# Patient Record
Sex: FEMALE | Race: WHITE | Employment: OTHER | URBAN - METROPOLITAN AREA
[De-identification: names, ages, dates, MRNs, and addresses within clinical notes are randomized per-mention and may not be internally consistent; named-entity substitution may affect disease eponyms.]

---

## 2017-01-16 ENCOUNTER — APPOINTMENT (OUTPATIENT)
Dept: GENERAL RADIOLOGY | Facility: HOSPITAL | Age: 77
End: 2017-01-16
Attending: EMERGENCY MEDICINE
Payer: MEDICARE

## 2017-01-16 ENCOUNTER — HOSPITAL ENCOUNTER (OUTPATIENT)
Facility: HOSPITAL | Age: 77
Setting detail: OBSERVATION
Discharge: HOME | End: 2017-01-18
Attending: EMERGENCY MEDICINE | Admitting: FAMILY MEDICINE
Payer: MEDICARE

## 2017-01-16 DIAGNOSIS — R07.9 ACUTE CHEST PAIN: Primary | ICD-10-CM

## 2017-01-16 LAB
ANION GAP SERPL CALC-SCNC: 12 MMOL/L (ref 0–18)
APTT PPP: 36.4 SECONDS (ref 23.2–35.3)
BASOPHILS # BLD: 0.1 K/UL (ref 0–0.2)
BASOPHILS NFR BLD: 1 %
BILIRUB UR QL: NEGATIVE
BUN SERPL-MCNC: 12 MG/DL (ref 8–20)
BUN/CREAT SERPL: 11.1 (ref 10–20)
CALCIUM SERPL-MCNC: 9.1 MG/DL (ref 8.5–10.5)
CHLORIDE SERPL-SCNC: 102 MMOL/L (ref 95–110)
CO2 SERPL-SCNC: 24 MMOL/L (ref 22–32)
COLOR UR: YELLOW
CREAT SERPL-MCNC: 1.08 MG/DL (ref 0.5–1.5)
EOSINOPHIL # BLD: 0.2 K/UL (ref 0–0.7)
EOSINOPHIL NFR BLD: 2 %
ERYTHROCYTE [DISTWIDTH] IN BLOOD BY AUTOMATED COUNT: 16 % (ref 11–15)
GLUCOSE SERPL-MCNC: 147 MG/DL (ref 70–99)
GLUCOSE UR-MCNC: NEGATIVE MG/DL
HCT VFR BLD AUTO: 40.7 % (ref 35–48)
HGB BLD-MCNC: 13.2 G/DL (ref 12–16)
HGB UR QL STRIP.AUTO: NEGATIVE
INR BLD: 1 (ref 0.9–1.2)
KETONES UR-MCNC: NEGATIVE MG/DL
LYMPHOCYTES # BLD: 1.8 K/UL (ref 1–4)
LYMPHOCYTES NFR BLD: 18 %
MCH RBC QN AUTO: 26.1 PG (ref 27–32)
MCHC RBC AUTO-ENTMCNC: 32.4 G/DL (ref 32–37)
MCV RBC AUTO: 80.6 FL (ref 80–100)
MONOCYTES # BLD: 0.6 K/UL (ref 0–1)
MONOCYTES NFR BLD: 6 %
NEUTROPHILS # BLD AUTO: 7.1 K/UL (ref 1.8–7.7)
NEUTROPHILS NFR BLD: 73 %
NITRITE UR QL STRIP.AUTO: NEGATIVE
OSMOLALITY UR CALC.SUM OF ELEC: 288 MOSM/KG (ref 275–295)
PH UR: 5 [PH] (ref 5–8)
PLATELET # BLD AUTO: 451 K/UL (ref 140–400)
PMV BLD AUTO: 7.1 FL (ref 7.4–10.3)
POTASSIUM SERPL-SCNC: 4.5 MMOL/L (ref 3.3–5.1)
PROT UR-MCNC: NEGATIVE MG/DL
PROTHROMBIN TIME: 13.3 SECONDS (ref 11.8–14.5)
RBC # BLD AUTO: 5.05 M/UL (ref 3.7–5.4)
RBC #/AREA URNS AUTO: 2 /HPF
SODIUM SERPL-SCNC: 138 MMOL/L (ref 136–144)
SP GR UR STRIP: 1.02 (ref 1–1.03)
TROPONIN I SERPL-MCNC: 0 NG/ML (ref ?–0.03)
TROPONIN I SERPL-MCNC: 0 NG/ML (ref ?–0.03)
UROBILINOGEN UR STRIP-ACNC: <2
VIT C UR-MCNC: NEGATIVE MG/DL
WBC # BLD AUTO: 9.8 K/UL (ref 4–11)
WBC #/AREA URNS AUTO: 2 /HPF

## 2017-01-16 PROCEDURE — 85025 COMPLETE CBC W/AUTO DIFF WBC: CPT | Performed by: EMERGENCY MEDICINE

## 2017-01-16 PROCEDURE — 71010 XR CHEST AP PORTABLE  (CPT=71010): CPT

## 2017-01-16 PROCEDURE — 85730 THROMBOPLASTIN TIME PARTIAL: CPT | Performed by: NURSE PRACTITIONER

## 2017-01-16 PROCEDURE — 84484 ASSAY OF TROPONIN QUANT: CPT | Performed by: EMERGENCY MEDICINE

## 2017-01-16 PROCEDURE — 93005 ELECTROCARDIOGRAM TRACING: CPT

## 2017-01-16 PROCEDURE — 36415 COLL VENOUS BLD VENIPUNCTURE: CPT

## 2017-01-16 PROCEDURE — 99285 EMERGENCY DEPT VISIT HI MDM: CPT

## 2017-01-16 PROCEDURE — 93010 ELECTROCARDIOGRAM REPORT: CPT | Performed by: EMERGENCY MEDICINE

## 2017-01-16 PROCEDURE — 85610 PROTHROMBIN TIME: CPT | Performed by: NURSE PRACTITIONER

## 2017-01-16 PROCEDURE — 81001 URINALYSIS AUTO W/SCOPE: CPT | Performed by: EMERGENCY MEDICINE

## 2017-01-16 PROCEDURE — 80048 BASIC METABOLIC PNL TOTAL CA: CPT | Performed by: EMERGENCY MEDICINE

## 2017-01-16 RX ORDER — CHLORHEXIDINE GLUCONATE 4 G/100ML
30 SOLUTION TOPICAL
Status: COMPLETED | OUTPATIENT
Start: 2017-01-17 | End: 2017-01-16

## 2017-01-16 RX ORDER — AMLODIPINE BESYLATE 10 MG/1
10 TABLET ORAL DAILY
Status: DISCONTINUED | OUTPATIENT
Start: 2017-01-16 | End: 2017-01-16

## 2017-01-16 RX ORDER — NITROGLYCERIN 0.6 MG/1
0.6 TABLET SUBLINGUAL EVERY 5 MIN PRN
COMMUNITY
End: 2021-01-05

## 2017-01-16 RX ORDER — SODIUM CHLORIDE 9 MG/ML
INJECTION, SOLUTION INTRAVENOUS CONTINUOUS
Status: DISCONTINUED | OUTPATIENT
Start: 2017-01-16 | End: 2017-01-16

## 2017-01-16 RX ORDER — NITROGLYCERIN 0.4 MG/1
0.4 TABLET SUBLINGUAL EVERY 5 MIN PRN
Status: DISCONTINUED | OUTPATIENT
Start: 2017-01-16 | End: 2017-01-18

## 2017-01-16 RX ORDER — ASPIRIN 81 MG/1
324 TABLET, CHEWABLE ORAL ONCE
Status: COMPLETED | OUTPATIENT
Start: 2017-01-17 | End: 2017-01-17

## 2017-01-16 RX ORDER — SODIUM CHLORIDE 9 MG/ML
INJECTION, SOLUTION INTRAVENOUS CONTINUOUS
Status: CANCELLED | OUTPATIENT
Start: 2017-01-16

## 2017-01-16 RX ORDER — ASPIRIN 81 MG/1
81 TABLET ORAL DAILY
Status: DISCONTINUED | OUTPATIENT
Start: 2017-01-16 | End: 2017-01-16

## 2017-01-16 RX ORDER — ASPIRIN 81 MG/1
324 TABLET, CHEWABLE ORAL ONCE
Status: DISCONTINUED | OUTPATIENT
Start: 2017-01-16 | End: 2017-01-16

## 2017-01-16 RX ORDER — ATORVASTATIN CALCIUM 40 MG/1
40 TABLET, FILM COATED ORAL DAILY
Status: DISCONTINUED | OUTPATIENT
Start: 2017-01-16 | End: 2017-01-18

## 2017-01-16 RX ORDER — ASPIRIN 81 MG/1
324 TABLET, CHEWABLE ORAL ONCE
Status: CANCELLED | OUTPATIENT
Start: 2017-01-17

## 2017-01-16 RX ORDER — METOPROLOL SUCCINATE 100 MG/1
100 TABLET, EXTENDED RELEASE ORAL DAILY
Status: DISCONTINUED | OUTPATIENT
Start: 2017-01-17 | End: 2017-01-18

## 2017-01-16 RX ORDER — SODIUM CHLORIDE 9 MG/ML
INJECTION, SOLUTION INTRAVENOUS CONTINUOUS
Status: DISCONTINUED | OUTPATIENT
Start: 2017-01-17 | End: 2017-01-18

## 2017-01-16 RX ORDER — ASPIRIN 81 MG/1
81 TABLET ORAL DAILY
Status: DISCONTINUED | OUTPATIENT
Start: 2017-01-17 | End: 2017-01-18

## 2017-01-16 RX ORDER — CHLORHEXIDINE GLUCONATE 4 G/100ML
30 SOLUTION TOPICAL
Status: CANCELLED | OUTPATIENT
Start: 2017-01-17 | End: 2017-01-17

## 2017-01-16 RX ORDER — AMLODIPINE BESYLATE 10 MG/1
10 TABLET ORAL DAILY
Status: DISCONTINUED | OUTPATIENT
Start: 2017-01-17 | End: 2017-01-18

## 2017-01-16 RX ORDER — CLOPIDOGREL BISULFATE 75 MG/1
75 TABLET ORAL DAILY
Status: DISCONTINUED | OUTPATIENT
Start: 2017-01-16 | End: 2017-01-16

## 2017-01-16 RX ORDER — CLOPIDOGREL BISULFATE 75 MG/1
75 TABLET ORAL DAILY
Status: DISCONTINUED | OUTPATIENT
Start: 2017-01-17 | End: 2017-01-18

## 2017-01-16 RX ORDER — METOPROLOL SUCCINATE 100 MG/1
100 TABLET, EXTENDED RELEASE ORAL DAILY
Status: DISCONTINUED | OUTPATIENT
Start: 2017-01-16 | End: 2017-01-16

## 2017-01-16 NOTE — HISTORICAL OFFICE NOTE
Heidy Abimael  : 1940  ACCOUNT:  [id number]  [phone]  PCP: Dr. Danielito Santana     TODAY'S DATE: 11/15/2016  DICTATED BY:  Wolfgang Meek M.D.]    CHIEF COMPLAINT: [Followup of .  CAD - Native vessel.]    HPI: [On 11/15/2016, wilson Galvan Circ, CAD, cardiac arrest, cardiac catheterization 1999, cardiac catheterization 2000, cardiac catheterization July 2004, Circ coronary stent 1997, dyslipidemia, hypertension and RCA PTCA 1997    FAMILY HISTORY: Significant for premature CAD.  Negative for one year.]    PRESCRIPTIONS:   12/02/15 *Clopidogrel [id number]      ONE TABLET BY MOUTH EVERY DAY            11/17/15 *Metoprolol Succinate 100MG     1 TABLET DAILY. 11/25/14 *AmLODIPine Besylate  10MG      1 TABLET DAILY.

## 2017-01-16 NOTE — ED NOTES
PT safe to transfer to room per MD. Report given to Seble Fern[facility]dez [facility]. Telebox applied.

## 2017-01-16 NOTE — CONSULTS
[facility]    PATIENT'S NAME: Laura Hernandez   ATTENDING PHYSICIAN: Troy Encinas MD   CONSULTING PHYSICIAN: Mariaa Cole.  Tia Erickson MD   PATIENT ACCOUNT#:   [id number]    LOCATION:  [facility] 14 09 [facility] 1  MEDICAL RECORD #:   [id number]       DATE OF NIGHAT aneurysm. She is a nonsmoker, 2 or 3 cups of coffee a day. No alcohol. . Daughter at the bedside. She is retired. ALLERGIES:  Codeine. PHYSICAL EXAMINATION:    GENERAL:  Well-developed, well-nourished female, in no acute distress.   Alert Rosemarie Sosa MD  d: 01/16/2017 11:11:10  t: 01/16/2017 11:40:13  [facility] [id number]  [facility]/

## 2017-01-16 NOTE — ED PROVIDER NOTES
Patient Seen in: [facility]    History   Patient presents with:  Chest Pain Angina (cardiovascular)    Stated Complaint: Chest pain (angina)    HPI    64year old female with past medical history of hypertension, hypercholesterolemia, CAD with Father      CVA   • Lung Disorder Father      lung disease   • Heart Disease Father    • Breast Cancer Mother    • Hypertension Mother    • Arthritis Daughter      rheumatoid   • benign brain lesion [Other] [OTHER] Son          Smoking Status: Never Smoker She has a normal mood and affect. Her behavior is normal.   Nursing note and vitals reviewed.           ED Course     Labs Reviewed   BASIC METABOLIC PANEL (8) - Abnormal; Notable for the following:     Glucose 147 (*)     GFR, Non- 49 (*) Viewed and reviewed by myself and findings discussed with patient including need for follow up  ---------------    The patient has normal troponins, no acute EKG changes, but is high risk based on her past medical history including personal past history of

## 2017-01-16 NOTE — CONSULTS
Cardiology (Consult dictated)    Assessment:  1. Constellation of symptoms suspicious for ischemia. 2. History of CAD, PCI. Plan:  Admit for cardiac cath. Thank you.

## 2017-01-16 NOTE — HISTORICAL OFFICE NOTE
Marga Ramirez  : 1940  ACCOUNT:  [id number]  [phone]  PCP: Dr. Farzaneh Felder     TODAY'S DATE: 11/15/2016  DICTATED BY:  Sonya Stack M.D.]    CHIEF COMPLAINT: [Followup of .  CAD - Native vessel.]    HPI: [On 11/15/2016, wilson Sam Circ, CAD, cardiac arrest, cardiac catheterization 1999, cardiac catheterization 2000, cardiac catheterization July 2004, Circ coronary stent 1997, dyslipidemia, hypertension and RCA PTCA 1997    FAMILY HISTORY: Significant for premature CAD.  Negative for one year.]    PRESCRIPTIONS:   12/02/15 *Clopidogrel [id number]      ONE TABLET BY MOUTH EVERY DAY            11/17/15 *Metoprolol Succinate 100MG     1 TABLET DAILY. 11/25/14 *AmLODIPine Besylate  10MG      1 TABLET DAILY. function, with a calculated ejection fraction of 87%. PERFUSION IMAGING: At peak exercise, perfusion images were limited. There appeared to be a reduction of counts involving the distal anteroseptal region of the left ventricle.   Resting images demo

## 2017-01-17 ENCOUNTER — APPOINTMENT (OUTPATIENT)
Dept: INTERVENTIONAL RADIOLOGY/VASCULAR | Facility: HOSPITAL | Age: 77
End: 2017-01-17
Attending: INTERNAL MEDICINE
Payer: MEDICARE

## 2017-01-17 LAB
CHOLEST SERPL-MCNC: 137 MG/DL (ref 110–200)
HDLC SERPL-MCNC: 44 MG/DL
LDLC SERPL CALC-MCNC: 78 MG/DL (ref 0–99)
NONHDLC SERPL-MCNC: 93 MG/DL
TRIGL SERPL-MCNC: 74 MG/DL (ref 1–149)

## 2017-01-17 PROCEDURE — 4A033BC MEASUREMENT OF ARTERIAL PRESSURE, CORONARY, PERCUTANEOUS APPROACH: ICD-10-PCS | Performed by: INTERNAL MEDICINE

## 2017-01-17 PROCEDURE — B2111ZZ FLUOROSCOPY OF MULTIPLE CORONARY ARTERIES USING LOW OSMOLAR CONTRAST: ICD-10-PCS | Performed by: INTERNAL MEDICINE

## 2017-01-17 PROCEDURE — 80061 LIPID PANEL: CPT | Performed by: FAMILY MEDICINE

## 2017-01-17 PROCEDURE — G0463 HOSPITAL OUTPT CLINIC VISIT: HCPCS

## 2017-01-17 PROCEDURE — 4A023N7 MEASUREMENT OF CARDIAC SAMPLING AND PRESSURE, LEFT HEART, PERCUTANEOUS APPROACH: ICD-10-PCS | Performed by: INTERNAL MEDICINE

## 2017-01-17 PROCEDURE — B2151ZZ FLUOROSCOPY OF LEFT HEART USING LOW OSMOLAR CONTRAST: ICD-10-PCS | Performed by: INTERNAL MEDICINE

## 2017-01-17 PROCEDURE — 93571 IV DOP VEL&/PRESS C FLO 1ST: CPT

## 2017-01-17 PROCEDURE — 93458 L HRT ARTERY/VENTRICLE ANGIO: CPT

## 2017-01-17 PROCEDURE — 027034Z DILATION OF CORONARY ARTERY, ONE ARTERY WITH DRUG-ELUTING INTRALUMINAL DEVICE, PERCUTANEOUS APPROACH: ICD-10-PCS | Performed by: INTERNAL MEDICINE

## 2017-01-17 RX ORDER — VERAPAMIL HYDROCHLORIDE 2.5 MG/ML
INJECTION, SOLUTION INTRAVENOUS
Status: DISCONTINUED
Start: 2017-01-17 | End: 2017-01-17 | Stop reason: WASHOUT

## 2017-01-17 RX ORDER — ACETAMINOPHEN 325 MG/1
650 TABLET ORAL EVERY 4 HOURS PRN
Status: DISCONTINUED | OUTPATIENT
Start: 2017-01-17 | End: 2017-01-18

## 2017-01-17 RX ORDER — TRAMADOL HYDROCHLORIDE 50 MG/1
50 TABLET ORAL EVERY 6 HOURS PRN
Status: DISCONTINUED | OUTPATIENT
Start: 2017-01-17 | End: 2017-01-18

## 2017-01-17 RX ORDER — NITROGLYCERIN 20 MG/100ML
INJECTION INTRAVENOUS
Status: COMPLETED
Start: 2017-01-17 | End: 2017-01-17

## 2017-01-17 RX ORDER — MIDAZOLAM HYDROCHLORIDE 1 MG/ML
2 INJECTION INTRAMUSCULAR; INTRAVENOUS EVERY 5 MIN PRN
Status: DISCONTINUED | OUTPATIENT
Start: 2017-01-17 | End: 2017-01-18

## 2017-01-17 RX ORDER — HEPARIN SODIUM 1000 [USP'U]/ML
6000 INJECTION, SOLUTION INTRAVENOUS; SUBCUTANEOUS ONCE
Status: COMPLETED | OUTPATIENT
Start: 2017-01-17 | End: 2017-01-17

## 2017-01-17 RX ORDER — LIDOCAINE HYDROCHLORIDE 20 MG/ML
INJECTION, SOLUTION EPIDURAL; INFILTRATION; INTRACAUDAL; PERINEURAL
Status: COMPLETED
Start: 2017-01-17 | End: 2017-01-17

## 2017-01-17 RX ORDER — MIDAZOLAM HYDROCHLORIDE 1 MG/ML
INJECTION INTRAMUSCULAR; INTRAVENOUS
Status: COMPLETED
Start: 2017-01-17 | End: 2017-01-17

## 2017-01-17 RX ORDER — SODIUM CHLORIDE 9 MG/ML
INJECTION, SOLUTION INTRAVENOUS CONTINUOUS
Status: ACTIVE | OUTPATIENT
Start: 2017-01-17 | End: 2017-01-17

## 2017-01-17 RX ORDER — CLOPIDOGREL BISULFATE 75 MG/1
300 TABLET ORAL ONCE
Status: COMPLETED | OUTPATIENT
Start: 2017-01-17 | End: 2017-01-17

## 2017-01-17 RX ORDER — ADENOSINE 3 MG/ML
INJECTION, SOLUTION INTRAVENOUS
Status: DISCONTINUED
Start: 2017-01-17 | End: 2017-01-17 | Stop reason: WASHOUT

## 2017-01-17 RX ORDER — SODIUM CHLORIDE 9 MG/ML
INJECTION, SOLUTION INTRAVENOUS
Status: COMPLETED
Start: 2017-01-17 | End: 2017-01-17

## 2017-01-17 RX ORDER — ONDANSETRON 2 MG/ML
8 INJECTION INTRAMUSCULAR; INTRAVENOUS EVERY 8 HOURS PRN
Status: DISCONTINUED | OUTPATIENT
Start: 2017-01-17 | End: 2017-01-18

## 2017-01-17 RX ORDER — CLOPIDOGREL BISULFATE 75 MG/1
TABLET ORAL
Status: COMPLETED
Start: 2017-01-17 | End: 2017-01-17

## 2017-01-17 RX ORDER — HEPARIN SODIUM 1000 [USP'U]/ML
INJECTION, SOLUTION INTRAVENOUS; SUBCUTANEOUS
Status: COMPLETED
Start: 2017-01-17 | End: 2017-01-17

## 2017-01-17 RX ORDER — HEPARIN SODIUM 1000 [USP'U]/ML
INJECTION, SOLUTION INTRAVENOUS; SUBCUTANEOUS
Status: DISCONTINUED
Start: 2017-01-17 | End: 2017-01-17 | Stop reason: WASHOUT

## 2017-01-17 RX ORDER — TRAMADOL HYDROCHLORIDE 50 MG/1
100 TABLET ORAL EVERY 6 HOURS PRN
Status: DISCONTINUED | OUTPATIENT
Start: 2017-01-17 | End: 2017-01-18

## 2017-01-17 NOTE — PROGRESS NOTES
Report called to ZeusControls R.N..  Notified of finding excoriated groins upon arrival to lab. Post Cath and intervention report called to Jane at this time. Transferred to Cath lab holding. Right groin intact with out hematoma or active bleeding.   P

## 2017-01-17 NOTE — PLAN OF CARE
Aspirin for chest pain:  D. Patient took her usual 81 mg of aspirin in the morning with her other medications. Pt had chest pain/pressure and came to ED. No further aspirin given in ED. A.  Spoke to S APN to see if patient needed more aspirin today and r

## 2017-01-17 NOTE — H&P
[address] Patient Status:  Observation    1940 MRN [id number]   Location [facility]5W Attending Inez Voss MD   Hosp Day # 1 PCP Winsome Zamora MD, Femi Tinsley MD     Date:  2017  Date REMOVAL     REPAIR OF TM    Family History   Problem Relation Age of Onset   • Stroke Father      CVA   • Lung Disorder Father      lung disease   • Heart Disease Father    • Breast Cancer Mother    • Hypertension Mother    • Arthritis Daughter      rheuma Results  Component Value Date   WBC 9.8 01/16/2017   HGB 13.2 01/16/2017   HCT 40.7 01/16/2017   * 01/16/2017   CREATSERUM 1.08 01/16/2017   BUN 12 01/16/2017    01/16/2017   K 4.5 01/16/2017    01/16/2017   CO2 24 01/16/2017   *

## 2017-01-17 NOTE — PROGRESS NOTES
Dr. Sue Vang notified of Creatinine 1.08 GFR 49  IV fluids during cardiac cath increased to  100cc/hr

## 2017-01-17 NOTE — PROCEDURES
LM patent  LAD proximal 60-70%  Mid LAD 40%  Lcx stent Patent  OM1 and OM2 patent  RCA anterior takeoff irregularities    LVEF 55%    IFR of LAD positive 0.83  Stent PCI proximal LAD performed 3.5x15 mm MARGE excellent results 0% residual stenosis  Angioseal

## 2017-01-17 NOTE — PLAN OF CARE
CARDIOVASCULAR - ADULT    • Maintains optimal cardiac output and hemodynamic stability Progressing    • Absence of cardiac arrhythmias or at baseline Progressing        Medications reviewed before administation, safety, fall risk, and poc reviewed with pt.

## 2017-01-18 ENCOUNTER — PRIOR ORIGINAL RECORDS (OUTPATIENT)
Dept: OTHER | Age: 77
End: 2017-01-18

## 2017-01-18 VITALS
HEART RATE: 67 BPM | DIASTOLIC BLOOD PRESSURE: 55 MMHG | OXYGEN SATURATION: 96 % | HEIGHT: 65 IN | SYSTOLIC BLOOD PRESSURE: 110 MMHG | BODY MASS INDEX: 35.43 KG/M2 | TEMPERATURE: 98 F | WEIGHT: 212.69 LBS | RESPIRATION RATE: 17 BRPM

## 2017-01-18 LAB
ANION GAP SERPL CALC-SCNC: 9 MMOL/L (ref 0–18)
BUN SERPL-MCNC: 10 MG/DL (ref 8–20)
BUN/CREAT SERPL: 9.5 (ref 10–20)
CALCIUM SERPL-MCNC: 8.5 MG/DL (ref 8.5–10.5)
CHLORIDE SERPL-SCNC: 106 MMOL/L (ref 95–110)
CO2 SERPL-SCNC: 24 MMOL/L (ref 22–32)
CREAT SERPL-MCNC: 1.05 MG/DL (ref 0.5–1.5)
ERYTHROCYTE [DISTWIDTH] IN BLOOD BY AUTOMATED COUNT: 15.8 % (ref 11–15)
GLUCOSE SERPL-MCNC: 95 MG/DL (ref 70–99)
HCT VFR BLD AUTO: 37.4 % (ref 35–48)
HGB BLD-MCNC: 12.1 G/DL (ref 12–16)
MCH RBC QN AUTO: 26 PG (ref 27–32)
MCHC RBC AUTO-ENTMCNC: 32.3 G/DL (ref 32–37)
MCV RBC AUTO: 80.3 FL (ref 80–100)
OSMOLALITY UR CALC.SUM OF ELEC: 287 MOSM/KG (ref 275–295)
PLATELET # BLD AUTO: 395 K/UL (ref 140–400)
PMV BLD AUTO: 7.1 FL (ref 7.4–10.3)
POTASSIUM SERPL-SCNC: 4.2 MMOL/L (ref 3.3–5.1)
RBC # BLD AUTO: 4.65 M/UL (ref 3.7–5.4)
SODIUM SERPL-SCNC: 139 MMOL/L (ref 136–144)
WBC # BLD AUTO: 7.8 K/UL (ref 4–11)

## 2017-01-18 PROCEDURE — 80048 BASIC METABOLIC PNL TOTAL CA: CPT | Performed by: INTERNAL MEDICINE

## 2017-01-18 PROCEDURE — 85027 COMPLETE CBC AUTOMATED: CPT | Performed by: INTERNAL MEDICINE

## 2017-01-18 NOTE — PROGRESS NOTES
[facility]    Progress Note    Tash Hicks Patient Status:  Observation    1940 MRN [id number]   Location [address] [facility] Attending Jennifer Charles MD   Hosp Day # 2 PCP Apoorva Lozada MD, Caroline Morales MD       Subjective:   Jennifer Herman -------------------------- Sinus Rhythm Low voltage  -Poor R-wave progression -consider old anterior infarct.  - NonspecificST- T-abnormality.  ABNORMAL When compared with ECG of 07/27/2014 10:18:25 Heart rate has increased and voltage decreased [employer]

## 2017-01-18 NOTE — DISCHARGE SUMMARY
[facility]    Discharge Summary    Charli Fletcher Patient Status:  Observation    1940 MRN [id number]   Location [facility]5W Attending Daniella Elder MD   Hosp Day # 2 PCP Amado Bishop MD, Brianne Buck MD     Date of Admission:  Activity: As tolerated    Follow up: Follow-up Information     Follow up with Ba Cruz MD In 1 week.     Specialty:  Family Practice    Why:  As needed    Contact information:    [address]            [place]

## 2017-01-18 NOTE — PROGRESS NOTES
[facility]  Progress Note    Noé Corbin Patient Status:  Observation    1940 MRN [id number]   Location [facility]5W Attending Marcela Evans MD   Hosp Day # 2 PCP Tala Bloom MD, Regina Truong MD     Assessment:      1. Cad, with new rivera Besylate  10 mg Oral Daily   • aspirin  81 mg Oral Daily   • Clopidogrel Bisulfate  75 mg Oral Daily   • Metoprolol Succinate ER  100 mg Oral Daily     • sodium chloride Stopped (01/17/17 1230)       Nini Minaya MD  1/18/2017  9:04 AM

## 2017-01-25 ENCOUNTER — MYAURORA ACCOUNT LINK (OUTPATIENT)
Dept: OTHER | Age: 77
End: 2017-01-25

## 2017-01-25 ENCOUNTER — PRIOR ORIGINAL RECORDS (OUTPATIENT)
Dept: OTHER | Age: 77
End: 2017-01-25

## 2017-01-25 LAB
BUN: 10 MG/DL
CALCIUM: 8.5 MG/DL
CHLORIDE: 106 MEQ/L
CHOLESTEROL, TOTAL: 137 MG/DL
CREATININE, SERUM: 1.05 MG/DL
GLUCOSE: 95 MG/DL
HDL CHOLESTEROL: 44 MG/DL
LDL CHOLESTEROL: 78 MG/DL
NON-HDL CHOLESTEROL: 93 MG/DL
POTASSIUM, SERUM: 4.2 MEQ/L
SODIUM: 139 MEQ/L
TRIGLYCERIDES: 74 MG/DL

## 2017-03-07 ENCOUNTER — PRIOR ORIGINAL RECORDS (OUTPATIENT)
Dept: OTHER | Age: 77
End: 2017-03-07

## 2017-11-03 ENCOUNTER — HOSPITAL ENCOUNTER (OUTPATIENT)
Dept: MAMMOGRAPHY | Age: 77
Discharge: HOME | End: 2017-11-03
Attending: FAMILY MEDICINE
Payer: MEDICARE

## 2017-11-03 DIAGNOSIS — Z12.39 ENCOUNTER FOR SCREENING FOR MALIGNANT NEOPLASM OF BREAST: ICD-10-CM

## 2017-11-03 PROCEDURE — 77067 SCR MAMMO BI INCL CAD: CPT | Performed by: FAMILY MEDICINE

## 2017-11-06 ENCOUNTER — OFFICE VISIT (OUTPATIENT)
Dept: DERMATOLOGY CLINIC | Facility: CLINIC | Age: 77
End: 2017-11-06

## 2017-11-06 DIAGNOSIS — D23.70 BENIGN NEOPLASM OF SKIN OF LOWER LIMB, INCLUDING HIP, UNSPECIFIED LATERALITY: ICD-10-CM

## 2017-11-06 DIAGNOSIS — L57.0 ACTINIC KERATOSIS: Primary | ICD-10-CM

## 2017-11-06 DIAGNOSIS — D23.30 BENIGN NEOPLASM OF SKIN OF FACE: ICD-10-CM

## 2017-11-06 DIAGNOSIS — D23.4 BENIGN NEOPLASM OF SCALP AND SKIN OF NECK: ICD-10-CM

## 2017-11-06 DIAGNOSIS — L82.1 SEBORRHEIC KERATOSES: ICD-10-CM

## 2017-11-06 DIAGNOSIS — L81.4 SOLAR LENTIGO: ICD-10-CM

## 2017-11-06 DIAGNOSIS — Z85.828 PERSONAL HISTORY OF SKIN CANCER: ICD-10-CM

## 2017-11-06 DIAGNOSIS — D23.5 BENIGN NEOPLASM OF SKIN OF TRUNK, EXCEPT SCROTUM: ICD-10-CM

## 2017-11-06 DIAGNOSIS — D23.60 BENIGN NEOPLASM OF SKIN OF UPPER LIMB, INCLUDING SHOULDER, UNSPECIFIED LATERALITY: ICD-10-CM

## 2017-11-06 PROCEDURE — 17003 DESTRUCT PREMALG LES 2-14: CPT | Performed by: DERMATOLOGY

## 2017-11-06 PROCEDURE — 99213 OFFICE O/P EST LOW 20 MIN: CPT | Performed by: DERMATOLOGY

## 2017-11-06 PROCEDURE — 17000 DESTRUCT PREMALG LESION: CPT | Performed by: DERMATOLOGY

## 2017-11-06 RX ORDER — PANTOPRAZOLE SODIUM 40 MG/1
40 TABLET, DELAYED RELEASE ORAL
Status: ON HOLD | COMMUNITY
Start: 2017-11-01 | End: 2020-10-29

## 2017-11-06 NOTE — PROGRESS NOTES
Past Medical History:   Diagnosis Date   • Actinic keratoses 2013    left jaw   • Coronary atherosclerosis    • GERD (gastroesophageal reflux disease)    • High blood pressure    • High cholesterol    • History of stomach ulcers    • Osteoarthritis    • Sq

## 2017-11-06 NOTE — PROGRESS NOTES
HPI:     Chief Complaint     Lesion        HPI     Lesion    Additional comments: Last visit to derm was 1 yr prior. Pt presents today with hx of AKs and SCC and is due for full body skin evaluation.          Last edited by Spencer Corcoran RN on 11/6/201 lateral left cheek     Past Surgical History:  2013: ANGIOPLASTY (CORONARY)      Comment: multiple angioplasties  2013 and 2017: ANGIOPLASTY (CORONARY)  No date: CATH BARE METAL STENT (BMS)  No date: CATH DRUG ELUTING STENT  No date: TOTAL KNEE REPLACEM as noted on arm and hand and back are treated with cryotherapy  Personal history of skin cancer-patient will follow-up yearly.   Will come sooner if notes anything new or changing  Seborrheic keratoses-reassurance–no treatment  Solar lentigo-proper use and

## 2017-11-17 ENCOUNTER — PRIOR ORIGINAL RECORDS (OUTPATIENT)
Dept: OTHER | Age: 77
End: 2017-11-17

## 2018-01-18 ENCOUNTER — PRIOR ORIGINAL RECORDS (OUTPATIENT)
Dept: OTHER | Age: 78
End: 2018-01-18

## 2018-01-18 ENCOUNTER — LAB ENCOUNTER (OUTPATIENT)
Dept: LAB | Age: 78
End: 2018-01-18
Attending: INTERNAL MEDICINE
Payer: MEDICARE

## 2018-01-18 DIAGNOSIS — I25.10 CAD (CORONARY ARTERY DISEASE): Primary | ICD-10-CM

## 2018-01-18 LAB
ALT SERPL-CCNC: 12 U/L (ref 14–54)
AST SERPL-CCNC: 19 U/L (ref 15–41)
CHOLEST SERPL-MCNC: 157 MG/DL (ref 110–200)
CK SERPL-CCNC: 50 U/L (ref 38–234)
HDLC SERPL-MCNC: 52 MG/DL
LDLC SERPL CALC-MCNC: 77 MG/DL (ref 0–99)
NONHDLC SERPL-MCNC: 105 MG/DL
TRIGL SERPL-MCNC: 142 MG/DL (ref 1–149)

## 2018-01-18 PROCEDURE — 80061 LIPID PANEL: CPT

## 2018-01-18 PROCEDURE — 84450 TRANSFERASE (AST) (SGOT): CPT

## 2018-01-18 PROCEDURE — 36415 COLL VENOUS BLD VENIPUNCTURE: CPT

## 2018-01-18 PROCEDURE — 82550 ASSAY OF CK (CPK): CPT

## 2018-01-18 PROCEDURE — 84460 ALANINE AMINO (ALT) (SGPT): CPT

## 2018-01-19 ENCOUNTER — PRIOR ORIGINAL RECORDS (OUTPATIENT)
Dept: OTHER | Age: 78
End: 2018-01-19

## 2018-01-19 LAB
ALT (SGPT): 12 U/L
AST (SGOT): 19 U/L
CHOLESTEROL, TOTAL: 157 MG/DL
CREATININE KINASE: 50 U/L
HDL CHOLESTEROL: 52 MG/DL
LDL CHOLESTEROL: 77 MG/DL
NON-HDL CHOLESTEROL: 105 MG/DL
TRIGLYCERIDES: 142 MG/DL

## 2018-02-01 ENCOUNTER — PRIOR ORIGINAL RECORDS (OUTPATIENT)
Dept: OTHER | Age: 78
End: 2018-02-01

## 2018-05-22 ENCOUNTER — PRIOR ORIGINAL RECORDS (OUTPATIENT)
Dept: OTHER | Age: 78
End: 2018-05-22

## 2018-05-22 ENCOUNTER — APPOINTMENT (OUTPATIENT)
Dept: LAB | Age: 78
End: 2018-05-22
Attending: INTERNAL MEDICINE
Payer: MEDICARE

## 2018-05-22 DIAGNOSIS — E78.00 PURE HYPERCHOLESTEROLEMIA: ICD-10-CM

## 2018-05-22 PROCEDURE — 82550 ASSAY OF CK (CPK): CPT

## 2018-05-22 PROCEDURE — 84450 TRANSFERASE (AST) (SGOT): CPT

## 2018-05-22 PROCEDURE — 84460 ALANINE AMINO (ALT) (SGPT): CPT

## 2018-05-22 PROCEDURE — 36415 COLL VENOUS BLD VENIPUNCTURE: CPT

## 2018-05-22 PROCEDURE — 80061 LIPID PANEL: CPT

## 2018-05-23 LAB
ALT (SGPT): 16 U/L
AST (SGOT): 22 U/L
CHOLESTEROL, TOTAL: 138 MG/DL
CREATININE KINASE: 53 U/L
HDL CHOLESTEROL: 52 MG/DL
LDL CHOLESTEROL: 67 MG/DL
TRIGLYCERIDES: 96 MG/DL

## 2018-06-05 ENCOUNTER — PRIOR ORIGINAL RECORDS (OUTPATIENT)
Dept: OTHER | Age: 78
End: 2018-06-05

## 2018-11-06 ENCOUNTER — OFFICE VISIT (OUTPATIENT)
Dept: DERMATOLOGY CLINIC | Facility: CLINIC | Age: 78
End: 2018-11-06
Payer: MEDICARE

## 2018-11-06 DIAGNOSIS — L81.4 SOLAR LENTIGO: ICD-10-CM

## 2018-11-06 DIAGNOSIS — L57.0 ACTINIC KERATOSIS: ICD-10-CM

## 2018-11-06 DIAGNOSIS — D23.70 BENIGN NEOPLASM OF SKIN OF LOWER LIMB, INCLUDING HIP, UNSPECIFIED LATERALITY: ICD-10-CM

## 2018-11-06 DIAGNOSIS — D23.4 BENIGN NEOPLASM OF SCALP AND SKIN OF NECK: ICD-10-CM

## 2018-11-06 DIAGNOSIS — D23.60 BENIGN NEOPLASM OF SKIN OF UPPER LIMB, INCLUDING SHOULDER, UNSPECIFIED LATERALITY: ICD-10-CM

## 2018-11-06 DIAGNOSIS — D23.5 BENIGN NEOPLASM OF SKIN OF TRUNK, EXCEPT SCROTUM: ICD-10-CM

## 2018-11-06 DIAGNOSIS — L82.1 SEBORRHEIC KERATOSES: ICD-10-CM

## 2018-11-06 DIAGNOSIS — Z85.828 PERSONAL HISTORY OF SKIN CANCER: Primary | ICD-10-CM

## 2018-11-06 DIAGNOSIS — D23.30 BENIGN NEOPLASM OF SKIN OF FACE: ICD-10-CM

## 2018-11-06 DIAGNOSIS — L82.0 INFLAMED SEBORRHEIC KERATOSIS: ICD-10-CM

## 2018-11-06 PROCEDURE — 17000 DESTRUCT PREMALG LESION: CPT | Performed by: DERMATOLOGY

## 2018-11-06 PROCEDURE — 17003 DESTRUCT PREMALG LES 2-14: CPT | Performed by: DERMATOLOGY

## 2018-11-06 PROCEDURE — 17110 DESTRUCTION B9 LES UP TO 14: CPT | Performed by: DERMATOLOGY

## 2018-11-06 PROCEDURE — 99213 OFFICE O/P EST LOW 20 MIN: CPT | Performed by: DERMATOLOGY

## 2018-11-06 NOTE — PROGRESS NOTES
HPI:     Chief Complaint     Actinic Keratosis        HPI     Actinic Keratosis      Additional comments: Annual full body check - No change from last visit          Last edited by Yue Armendariz, [address] on 11/6/2018  8:05 AM. (History)        Patient is here t • High blood pressure    • High cholesterol    • History of stomach ulcers    • Osteoarthritis    • Squamous carcinoma 2001    right cheek   • Squamous cell carcinoma 1997    lateral left cheek     Past Surgical History:   Procedure Laterality Date   • A History   Problem Relation Age of Onset   • Stroke Father         CVA   • Lung Disorder Father         lung disease   • Heart Disease Father    • Breast Cancer Mother    • Hypertension Mother    • Arthritis Daughter         rheumatoid   • Other (benign bra 30 or higher, hats, discussed  Benign neoplasm of scalp and skin of neck  Benign neoplasm of skin of face  Benign neoplasm of skin of upper limb, including shoulder, unspecified laterality  Benign neoplasm of skin of lower limb, including hip, unspecified

## 2018-11-20 ENCOUNTER — PRIOR ORIGINAL RECORDS (OUTPATIENT)
Dept: OTHER | Age: 78
End: 2018-11-20

## 2018-11-20 ENCOUNTER — MYAURORA ACCOUNT LINK (OUTPATIENT)
Dept: OTHER | Age: 78
End: 2018-11-20

## 2019-02-28 VITALS
RESPIRATION RATE: 18 BRPM | WEIGHT: 214 LBS | HEART RATE: 60 BPM | BODY MASS INDEX: 36.54 KG/M2 | OXYGEN SATURATION: 98 % | HEIGHT: 64 IN | SYSTOLIC BLOOD PRESSURE: 130 MMHG | DIASTOLIC BLOOD PRESSURE: 60 MMHG

## 2019-02-28 VITALS
DIASTOLIC BLOOD PRESSURE: 70 MMHG | BODY MASS INDEX: 35 KG/M2 | WEIGHT: 205 LBS | HEART RATE: 58 BPM | OXYGEN SATURATION: 96 % | HEIGHT: 64 IN | SYSTOLIC BLOOD PRESSURE: 120 MMHG

## 2019-03-01 VITALS
RESPIRATION RATE: 18 BRPM | DIASTOLIC BLOOD PRESSURE: 70 MMHG | WEIGHT: 217 LBS | BODY MASS INDEX: 36.15 KG/M2 | HEART RATE: 104 BPM | SYSTOLIC BLOOD PRESSURE: 130 MMHG | OXYGEN SATURATION: 98 % | HEIGHT: 65 IN

## 2019-03-01 VITALS
HEIGHT: 65 IN | BODY MASS INDEX: 35.99 KG/M2 | WEIGHT: 216 LBS | HEART RATE: 59 BPM | DIASTOLIC BLOOD PRESSURE: 60 MMHG | SYSTOLIC BLOOD PRESSURE: 112 MMHG | OXYGEN SATURATION: 95 %

## 2019-04-18 RX ORDER — AMLODIPINE BESYLATE 10 MG/1
TABLET ORAL
COMMUNITY
Start: 2018-11-28 | End: 2019-12-03 | Stop reason: SDUPTHER

## 2019-04-18 RX ORDER — EZETIMIBE 10 MG/1
TABLET ORAL
COMMUNITY
Start: 2018-11-28 | End: 2019-07-15 | Stop reason: SDUPTHER

## 2019-04-18 RX ORDER — CLOPIDOGREL BISULFATE 75 MG/1
TABLET ORAL
COMMUNITY
Start: 2018-11-28 | End: 2019-12-03 | Stop reason: SDUPTHER

## 2019-04-18 RX ORDER — ATORVASTATIN CALCIUM 40 MG/1
TABLET, FILM COATED ORAL
COMMUNITY
Start: 2018-11-28 | End: 2019-12-03 | Stop reason: SDUPTHER

## 2019-04-18 RX ORDER — METOPROLOL SUCCINATE 100 MG/1
TABLET, EXTENDED RELEASE ORAL
COMMUNITY
End: 2019-12-03 | Stop reason: SDUPTHER

## 2019-04-18 RX ORDER — PANTOPRAZOLE SODIUM 40 MG/1
40 TABLET, DELAYED RELEASE ORAL
COMMUNITY

## 2019-07-12 RX ORDER — EZETIMIBE 10 MG/1
10 TABLET ORAL AT BEDTIME
Qty: 90 TABLET | Refills: 0 | OUTPATIENT
Start: 2019-07-12

## 2019-07-15 ENCOUNTER — TELEPHONE (OUTPATIENT)
Dept: CARDIOLOGY | Age: 79
End: 2019-07-15

## 2019-07-15 RX ORDER — EZETIMIBE 10 MG/1
10 TABLET ORAL AT BEDTIME
Qty: 30 TABLET | Refills: 0 | Status: SHIPPED | OUTPATIENT
Start: 2019-07-15 | End: 2019-08-20 | Stop reason: SDUPTHER

## 2019-08-24 ENCOUNTER — LAB ENCOUNTER (OUTPATIENT)
Dept: LAB | Age: 79
End: 2019-08-24
Attending: INTERNAL MEDICINE
Payer: MEDICARE

## 2019-08-24 DIAGNOSIS — E78.00 HYPERCHOLESTEREMIA: Primary | ICD-10-CM

## 2019-08-24 LAB
ALT SERPL-CCNC: 17 U/L
ALT SERPL-CCNC: 17 U/L (ref 13–56)
AST SERPL-CCNC: 19 U/L
AST SERPL-CCNC: 19 U/L (ref 15–37)
CHOLEST SERPL-MCNC: 137 MG/DL
CHOLEST SMN-MCNC: 137 MG/DL (ref ?–200)
CHOLEST/HDLC SERPL: NORMAL {RATIO}
CK SERPL-CCNC: 71 U/L
CK SERPL-CCNC: 71 U/L (ref 26–192)
HDLC SERPL-MCNC: 54 MG/DL
HDLC SERPL-MCNC: 54 MG/DL (ref 40–59)
LDLC SERPL CALC-MCNC: 59 MG/DL
LDLC SERPL CALC-MCNC: 59 MG/DL (ref ?–100)
LENGTH OF FAST TIME PATIENT: NORMAL H
NONHDLC SERPL-MCNC: 83 MG/DL
NONHDLC SERPL-MCNC: 83 MG/DL (ref ?–130)
PATIENT FASTING: YES
TRIGL SERPL-MCNC: 118 MG/DL
TRIGL SERPL-MCNC: 118 MG/DL (ref 30–149)
VLDLC SERPL CALC-MCNC: 24 MG/DL
VLDLC SERPL CALC-MCNC: 24 MG/DL (ref 0–30)

## 2019-08-24 PROCEDURE — 84460 ALANINE AMINO (ALT) (SGPT): CPT

## 2019-08-24 PROCEDURE — 80061 LIPID PANEL: CPT

## 2019-08-24 PROCEDURE — 36415 COLL VENOUS BLD VENIPUNCTURE: CPT

## 2019-08-24 PROCEDURE — 82550 ASSAY OF CK (CPK): CPT

## 2019-08-24 PROCEDURE — 84450 TRANSFERASE (AST) (SGOT): CPT

## 2019-08-26 ENCOUNTER — CLINICAL ABSTRACT (OUTPATIENT)
Dept: CARDIOLOGY | Age: 79
End: 2019-08-26

## 2019-08-28 RX ORDER — EZETIMIBE 10 MG/1
10 TABLET ORAL AT BEDTIME
Qty: 30 TABLET | Refills: 3 | Status: SHIPPED | OUTPATIENT
Start: 2019-08-28 | End: 2019-12-03 | Stop reason: SDUPTHER

## 2019-12-03 ENCOUNTER — OFFICE VISIT (OUTPATIENT)
Dept: CARDIOLOGY | Age: 79
End: 2019-12-03

## 2019-12-03 VITALS
SYSTOLIC BLOOD PRESSURE: 110 MMHG | HEART RATE: 61 BPM | WEIGHT: 203 LBS | DIASTOLIC BLOOD PRESSURE: 60 MMHG | BODY MASS INDEX: 33.82 KG/M2 | OXYGEN SATURATION: 97 % | HEIGHT: 65 IN

## 2019-12-03 DIAGNOSIS — E78.5 DYSLIPIDEMIA: ICD-10-CM

## 2019-12-03 DIAGNOSIS — I25.10 CORONARY ARTERY DISEASE INVOLVING NATIVE CORONARY ARTERY OF NATIVE HEART WITHOUT ANGINA PECTORIS: Primary | ICD-10-CM

## 2019-12-03 DIAGNOSIS — I10 ESSENTIAL HYPERTENSION: ICD-10-CM

## 2019-12-03 PROCEDURE — 99214 OFFICE O/P EST MOD 30 MIN: CPT | Performed by: INTERNAL MEDICINE

## 2019-12-03 RX ORDER — CLOPIDOGREL BISULFATE 75 MG/1
75 TABLET ORAL DAILY
Qty: 90 TABLET | Refills: 3 | Status: SHIPPED | OUTPATIENT
Start: 2019-12-03 | End: 2019-12-04 | Stop reason: SDUPTHER

## 2019-12-03 RX ORDER — ATORVASTATIN CALCIUM 40 MG/1
40 TABLET, FILM COATED ORAL AT BEDTIME
Qty: 90 TABLET | Refills: 3 | Status: SHIPPED | OUTPATIENT
Start: 2019-12-03 | End: 2019-12-04 | Stop reason: SDUPTHER

## 2019-12-03 RX ORDER — METOPROLOL SUCCINATE 100 MG/1
100 TABLET, EXTENDED RELEASE ORAL DAILY
Qty: 90 TABLET | Refills: 3 | Status: SHIPPED | OUTPATIENT
Start: 2019-12-03 | End: 2020-12-04 | Stop reason: SDUPTHER

## 2019-12-03 RX ORDER — AMLODIPINE BESYLATE 10 MG/1
10 TABLET ORAL DAILY
Qty: 90 TABLET | Refills: 3 | Status: SHIPPED | OUTPATIENT
Start: 2019-12-03 | End: 2020-12-28 | Stop reason: SDUPTHER

## 2019-12-03 RX ORDER — EZETIMIBE 10 MG/1
10 TABLET ORAL AT BEDTIME
Qty: 90 TABLET | Refills: 3 | Status: SHIPPED | OUTPATIENT
Start: 2019-12-03 | End: 2020-12-28 | Stop reason: SDUPTHER

## 2019-12-03 ASSESSMENT — PATIENT HEALTH QUESTIONNAIRE - PHQ9
SUM OF ALL RESPONSES TO PHQ9 QUESTIONS 1 AND 2: 0
SUM OF ALL RESPONSES TO PHQ9 QUESTIONS 1 AND 2: 0
2. FEELING DOWN, DEPRESSED OR HOPELESS: NOT AT ALL
1. LITTLE INTEREST OR PLEASURE IN DOING THINGS: NOT AT ALL

## 2019-12-04 RX ORDER — ATORVASTATIN CALCIUM 40 MG/1
40 TABLET, FILM COATED ORAL AT BEDTIME
Qty: 90 TABLET | Refills: 3 | Status: SHIPPED | OUTPATIENT
Start: 2019-12-04 | End: 2021-04-01 | Stop reason: SDUPTHER

## 2019-12-04 RX ORDER — CLOPIDOGREL BISULFATE 75 MG/1
75 TABLET ORAL DAILY
Qty: 90 TABLET | Refills: 3 | Status: SHIPPED | OUTPATIENT
Start: 2019-12-04 | End: 2021-04-01 | Stop reason: SDUPTHER

## 2020-05-27 ENCOUNTER — OFFICE VISIT (OUTPATIENT)
Dept: DERMATOLOGY CLINIC | Facility: CLINIC | Age: 80
End: 2020-05-27
Payer: MEDICARE

## 2020-05-27 DIAGNOSIS — D23.70 BENIGN NEOPLASM OF SKIN OF LOWER LIMB, INCLUDING HIP, UNSPECIFIED LATERALITY: ICD-10-CM

## 2020-05-27 DIAGNOSIS — D23.5 BENIGN NEOPLASM OF SKIN OF TRUNK, EXCEPT SCROTUM: ICD-10-CM

## 2020-05-27 DIAGNOSIS — D23.60 BENIGN NEOPLASM OF SKIN OF UPPER LIMB, INCLUDING SHOULDER, UNSPECIFIED LATERALITY: ICD-10-CM

## 2020-05-27 DIAGNOSIS — L82.1 SEBORRHEIC KERATOSES: ICD-10-CM

## 2020-05-27 DIAGNOSIS — L57.8 SUN-DAMAGED SKIN: ICD-10-CM

## 2020-05-27 DIAGNOSIS — L81.4 SOLAR LENTIGO: ICD-10-CM

## 2020-05-27 DIAGNOSIS — D23.30 BENIGN NEOPLASM OF SKIN OF FACE: ICD-10-CM

## 2020-05-27 DIAGNOSIS — D23.4 BENIGN NEOPLASM OF SCALP AND SKIN OF NECK: ICD-10-CM

## 2020-05-27 DIAGNOSIS — Z85.828 PERSONAL HISTORY OF SKIN CANCER: Primary | ICD-10-CM

## 2020-05-27 DIAGNOSIS — L57.0 ACTINIC KERATOSIS: ICD-10-CM

## 2020-05-27 PROCEDURE — 17000 DESTRUCT PREMALG LESION: CPT | Performed by: DERMATOLOGY

## 2020-05-27 PROCEDURE — 99213 OFFICE O/P EST LOW 20 MIN: CPT | Performed by: DERMATOLOGY

## 2020-05-27 PROCEDURE — 17003 DESTRUCT PREMALG LES 2-14: CPT | Performed by: DERMATOLOGY

## 2020-05-27 PROCEDURE — G0463 HOSPITAL OUTPT CLINIC VISIT: HCPCS | Performed by: DERMATOLOGY

## 2020-05-27 NOTE — PROGRESS NOTES
HPI:     Chief Complaint     Full Skin Exam        HPI     Full Skin Exam      Additional comments: established pt, presents for 18 months full body exam, hx of AKs and SCC, concerned about crusty, grey lesions to left cheek.            Last edited by Xiomara Hightower UNKNOWN    Past Medical History:   Diagnosis Date   • Actinic keratoses 2013    left jaw   • Coronary atherosclerosis    • GERD (gastroesophageal reflux disease)    • High blood pressure    • High cholesterol    • History of stomach ulcers    • Osteoarthri Forced sexual activity: Not on file    Other Topics      Concerns:         Service: Not Asked        Blood Transfusions: Not Asked        Caffeine Concern: Yes        Occupational Exposure: Not Asked        Hobby Hazards: Not Asked        Sleep Con Lesion the patient notes on left cheek is a gray and brown noninflamed keratosis      ASSESSMENT/PLAN:   Personal history of skin cancer  (primary encounter diagnosis)-no evidence of recurrent or new suspicious lesions.   Better use of sunblock on a daily

## 2020-09-09 ENCOUNTER — HOSPITAL ENCOUNTER (OUTPATIENT)
Facility: HOSPITAL | Age: 80
Setting detail: OBSERVATION
Discharge: HOME | End: 2020-09-10
Attending: EMERGENCY MEDICINE | Admitting: HOSPITALIST
Payer: MEDICARE

## 2020-09-09 ENCOUNTER — APPOINTMENT (OUTPATIENT)
Dept: GENERAL RADIOLOGY | Facility: HOSPITAL | Age: 80
End: 2020-09-09
Attending: EMERGENCY MEDICINE
Payer: MEDICARE

## 2020-09-09 DIAGNOSIS — R07.9 EXERTIONAL CHEST PAIN: Primary | ICD-10-CM

## 2020-09-09 LAB
ANION GAP SERPL CALC-SCNC: 5 MMOL/L (ref 0–18)
BASOPHILS # BLD AUTO: 0.04 X10(3) UL (ref 0–0.2)
BASOPHILS NFR BLD AUTO: 0.4 %
BUN BLD-MCNC: 18 MG/DL (ref 7–18)
BUN/CREAT SERPL: 14.1 (ref 10–20)
CALCIUM BLD-MCNC: 8.9 MG/DL (ref 8.5–10.1)
CHLORIDE SERPL-SCNC: 107 MMOL/L (ref 98–112)
CO2 SERPL-SCNC: 27 MMOL/L (ref 21–32)
CREAT BLD-MCNC: 1.28 MG/DL (ref 0.55–1.02)
DEPRECATED RDW RBC AUTO: 49.1 FL (ref 35.1–46.3)
EOSINOPHIL # BLD AUTO: 0.18 X10(3) UL (ref 0–0.7)
EOSINOPHIL NFR BLD AUTO: 1.9 %
ERYTHROCYTE [DISTWIDTH] IN BLOOD BY AUTOMATED COUNT: 16.4 % (ref 11–15)
GLUCOSE BLD-MCNC: 107 MG/DL (ref 70–99)
HCT VFR BLD AUTO: 40.8 % (ref 35–48)
HGB BLD-MCNC: 12.8 G/DL (ref 12–16)
IMM GRANULOCYTES # BLD AUTO: 0.03 X10(3) UL (ref 0–1)
IMM GRANULOCYTES NFR BLD: 0.3 %
LYMPHOCYTES # BLD AUTO: 1.88 X10(3) UL (ref 1–4)
LYMPHOCYTES NFR BLD AUTO: 19.3 %
MCH RBC QN AUTO: 25.7 PG (ref 26–34)
MCHC RBC AUTO-ENTMCNC: 31.4 G/DL (ref 31–37)
MCV RBC AUTO: 81.8 FL (ref 80–100)
MONOCYTES # BLD AUTO: 0.79 X10(3) UL (ref 0.1–1)
MONOCYTES NFR BLD AUTO: 8.1 %
NEUTROPHILS # BLD AUTO: 6.8 X10 (3) UL (ref 1.5–7.7)
NEUTROPHILS # BLD AUTO: 6.8 X10(3) UL (ref 1.5–7.7)
NEUTROPHILS NFR BLD AUTO: 70 %
OSMOLALITY SERPL CALC.SUM OF ELEC: 290 MOSM/KG (ref 275–295)
PLATELET # BLD AUTO: 422 10(3)UL (ref 150–450)
POTASSIUM SERPL-SCNC: 4.5 MMOL/L (ref 3.5–5.1)
RBC # BLD AUTO: 4.99 X10(6)UL (ref 3.8–5.3)
SODIUM SERPL-SCNC: 139 MMOL/L (ref 136–145)
TROPONIN I SERPL-MCNC: <0.045 NG/ML (ref ?–0.04)
WBC # BLD AUTO: 9.7 X10(3) UL (ref 4–11)

## 2020-09-09 PROCEDURE — 99220 INITIAL OBSERVATION CARE,LEVL III: CPT | Performed by: HOSPITALIST

## 2020-09-09 PROCEDURE — 99214 OFFICE O/P EST MOD 30 MIN: CPT | Performed by: INTERNAL MEDICINE

## 2020-09-09 PROCEDURE — 71045 X-RAY EXAM CHEST 1 VIEW: CPT | Performed by: EMERGENCY MEDICINE

## 2020-09-09 RX ORDER — ACETAMINOPHEN 325 MG/1
650 TABLET ORAL EVERY 6 HOURS PRN
Status: DISCONTINUED | OUTPATIENT
Start: 2020-09-09 | End: 2020-09-10

## 2020-09-09 RX ORDER — ONDANSETRON 2 MG/ML
4 INJECTION INTRAMUSCULAR; INTRAVENOUS EVERY 6 HOURS PRN
Status: DISCONTINUED | OUTPATIENT
Start: 2020-09-09 | End: 2020-09-10

## 2020-09-09 RX ORDER — ASPIRIN 81 MG/1
324 TABLET, CHEWABLE ORAL ONCE
Status: COMPLETED | OUTPATIENT
Start: 2020-09-09 | End: 2020-09-09

## 2020-09-09 RX ORDER — METOCLOPRAMIDE HYDROCHLORIDE 5 MG/ML
5 INJECTION INTRAMUSCULAR; INTRAVENOUS EVERY 8 HOURS PRN
Status: DISCONTINUED | OUTPATIENT
Start: 2020-09-09 | End: 2020-09-10

## 2020-09-09 RX ORDER — ASPIRIN 81 MG/1
81 TABLET ORAL DAILY
Status: DISCONTINUED | OUTPATIENT
Start: 2020-09-10 | End: 2020-09-10

## 2020-09-09 RX ORDER — HEPARIN SODIUM 5000 [USP'U]/ML
5000 INJECTION, SOLUTION INTRAVENOUS; SUBCUTANEOUS EVERY 12 HOURS SCHEDULED
Status: DISCONTINUED | OUTPATIENT
Start: 2020-09-09 | End: 2020-09-10

## 2020-09-09 RX ORDER — ATORVASTATIN CALCIUM 40 MG/1
40 TABLET, FILM COATED ORAL DAILY
Status: DISCONTINUED | OUTPATIENT
Start: 2020-09-10 | End: 2020-09-10

## 2020-09-09 RX ORDER — METOPROLOL SUCCINATE 100 MG/1
100 TABLET, EXTENDED RELEASE ORAL DAILY
Status: DISCONTINUED | OUTPATIENT
Start: 2020-09-10 | End: 2020-09-10

## 2020-09-09 RX ORDER — ASPIRIN 325 MG
325 TABLET ORAL DAILY
Status: DISCONTINUED | OUTPATIENT
Start: 2020-09-09 | End: 2020-09-09

## 2020-09-09 RX ORDER — CLOPIDOGREL BISULFATE 75 MG/1
75 TABLET ORAL DAILY
Status: DISCONTINUED | OUTPATIENT
Start: 2020-09-10 | End: 2020-09-10

## 2020-09-09 RX ORDER — SODIUM CHLORIDE 0.9 % (FLUSH) 0.9 %
3 SYRINGE (ML) INJECTION AS NEEDED
Status: DISCONTINUED | OUTPATIENT
Start: 2020-09-09 | End: 2020-09-10

## 2020-09-09 RX ORDER — NITROGLYCERIN 0.4 MG/1
0.4 TABLET SUBLINGUAL EVERY 5 MIN PRN
Status: DISCONTINUED | OUTPATIENT
Start: 2020-09-09 | End: 2020-09-10

## 2020-09-09 RX ORDER — PANTOPRAZOLE SODIUM 40 MG/1
40 TABLET, DELAYED RELEASE ORAL
Status: DISCONTINUED | OUTPATIENT
Start: 2020-09-10 | End: 2020-09-10

## 2020-09-09 RX ORDER — AMLODIPINE BESYLATE 5 MG/1
10 TABLET ORAL DAILY
Status: DISCONTINUED | OUTPATIENT
Start: 2020-09-10 | End: 2020-09-10

## 2020-09-09 NOTE — CONSULTS
Cardiology (consult dictated). Assessment:  1. Chest pressure and sweats, consistent with angina and similar to her previous angina. Currently pain-free. Initial EKG without acute changes.   Initial troponin normal.    2.  CAD, status post multivessel

## 2020-09-09 NOTE — CONSULTS
[facility]    PATIENT'S NAME: [facility]   ATTENDING PHYSICIAN: Bud Lees MD   CONSULTING PHYSICIAN: Valentino Passer.  Bishop Alexander MD   PATIENT ACCOUNT#:   [id number]    LOCATION:  [address] [place] [address] #:   [id number]       DATE OF BIRTH: disease. REVIEW OF SYSTEMS:  Negative except for the above. PHYSICAL EXAMINATION:    GENERAL:  Well-developed, well-nourished female, in no acute distress. Alert and oriented x3.    VITAL SIGNS:  Blood pressure 151/68; respirations 18, breathing u

## 2020-09-09 NOTE — ED NOTES
Orders for admission, patient is aware of plan and ready to go upstairs. Any questions, please call ED ALBERTO Washington  at extension [phone].    Type of COVID test sent:   COVID Suspicion level: Low/High - Low  Titratable drug(s) infusing:  Rate:    LOC at time

## 2020-09-09 NOTE — H&P
[facility]    PATIENT'S NAME: Betito Bunch   ATTENDING PHYSICIAN: Lianna Morales MD   PATIENT ACCOUNT#:   [id number]    LOCATION:   [address] [address] RECORD #:   [id number]       YOB: 1940  ADMISSION DATE:       09/09/2 down.  No clear correlation to food, oral intake, or emotional distress. Other 12-point review of systems negative. PHYSICAL EXAMINATION:    GENERAL:  Alert and oriented to time, place, and person, no acute distress.   VITAL SIGNS:  Temperature 98.3,

## 2020-09-09 NOTE — ED PROVIDER NOTES
Patient Seen in: [facility] AND [facility] Emergency Department      History   Patient presents with:  Chest Pain Angina    Stated Complaint: chest pressure     HPI    The patient is an 78-year-old female who presents with 2 days of intermittent chest pain and p (Axillary)   Resp 18   Ht 165.1 cm (5' 5\")   Wt 98.5 kg   SpO2 96%   Breastfeeding No   BMI 36.14 kg/m²         Physical Exam  Vitals signs and nursing note reviewed. Constitutional:       General: She is not in acute distress.      Appearance: She is we components:    MCH 25.7 (*)     RDW-SD 49.1 (*)     RDW 16.4 (*)     All other components within normal limits   TROPONIN I - Normal   TROPONIN I - Normal   CBC WITH DIFFERENTIAL WITH PLATELET    Narrative:      The following orders were created for panel o recommend that you schedule follow up care with a primary care provider within the next three months to obtain basic health screening including reassessment of your blood pressure.       Medications Prescribed:  Current Discharge Medication List

## 2020-09-09 NOTE — ED INITIAL ASSESSMENT (HPI)
Pt states she was sitting in her chair, became sweaty and felt pressure in her chest that has since subsided.

## 2020-09-09 NOTE — ED NOTES
Pt states has been having chest discomfort and sweating since 11:30am today. States has gotten a little better but still there at this time. Denies n/v/d, fever, cough. States h/o cardiac stents.

## 2020-09-10 ENCOUNTER — APPOINTMENT (OUTPATIENT)
Dept: NUCLEAR MEDICINE | Facility: HOSPITAL | Age: 80
End: 2020-09-10
Attending: HOSPITALIST
Payer: MEDICARE

## 2020-09-10 ENCOUNTER — APPOINTMENT (OUTPATIENT)
Dept: CV DIAGNOSTICS | Facility: HOSPITAL | Age: 80
End: 2020-09-10
Attending: HOSPITALIST
Payer: MEDICARE

## 2020-09-10 ENCOUNTER — APPOINTMENT (OUTPATIENT)
Dept: CV DIAGNOSTICS | Facility: HOSPITAL | Age: 80
End: 2020-09-10
Attending: NURSE PRACTITIONER
Payer: MEDICARE

## 2020-09-10 VITALS
SYSTOLIC BLOOD PRESSURE: 149 MMHG | WEIGHT: 217.19 LBS | RESPIRATION RATE: 18 BRPM | OXYGEN SATURATION: 96 % | TEMPERATURE: 98 F | BODY MASS INDEX: 36.19 KG/M2 | HEART RATE: 68 BPM | HEIGHT: 65 IN | DIASTOLIC BLOOD PRESSURE: 76 MMHG

## 2020-09-10 LAB
ANION GAP SERPL CALC-SCNC: 6 MMOL/L (ref 0–18)
BASOPHILS # BLD AUTO: 0.03 X10(3) UL (ref 0–0.2)
BASOPHILS NFR BLD AUTO: 0.3 %
BUN BLD-MCNC: 13 MG/DL (ref 7–18)
BUN/CREAT SERPL: 12.9 (ref 10–20)
CALCIUM BLD-MCNC: 9 MG/DL (ref 8.5–10.1)
CHLORIDE SERPL-SCNC: 108 MMOL/L (ref 98–112)
CHOLEST SERPL-MCNC: 149 MG/DL
CHOLEST SMN-MCNC: 149 MG/DL (ref ?–200)
CO2 SERPL-SCNC: 25 MMOL/L (ref 21–32)
CREAT BLD-MCNC: 1.01 MG/DL (ref 0.55–1.02)
DEPRECATED RDW RBC AUTO: 48.8 FL (ref 35.1–46.3)
EOSINOPHIL # BLD AUTO: 0.17 X10(3) UL (ref 0–0.7)
EOSINOPHIL NFR BLD AUTO: 1.9 %
ERYTHROCYTE [DISTWIDTH] IN BLOOD BY AUTOMATED COUNT: 16.2 % (ref 11–15)
GLUCOSE BLD-MCNC: 101 MG/DL (ref 70–99)
HCT VFR BLD AUTO: 41.9 % (ref 35–48)
HDLC SERPL-MCNC: 61 MG/DL
HDLC SERPL-MCNC: 61 MG/DL (ref 40–59)
HGB BLD-MCNC: 13 G/DL (ref 12–16)
IMM GRANULOCYTES # BLD AUTO: 0.03 X10(3) UL (ref 0–1)
IMM GRANULOCYTES NFR BLD: 0.3 %
LDLC SERPL CALC-MCNC: 68 MG/DL
LDLC SERPL CALC-MCNC: 68 MG/DL (ref ?–100)
LYMPHOCYTES # BLD AUTO: 1.33 X10(3) UL (ref 1–4)
LYMPHOCYTES NFR BLD AUTO: 14.9 %
MCH RBC QN AUTO: 25.6 PG (ref 26–34)
MCHC RBC AUTO-ENTMCNC: 31 G/DL (ref 31–37)
MCV RBC AUTO: 82.5 FL (ref 80–100)
MONOCYTES # BLD AUTO: 0.69 X10(3) UL (ref 0.1–1)
MONOCYTES NFR BLD AUTO: 7.7 %
NEUTROPHILS # BLD AUTO: 6.7 X10 (3) UL (ref 1.5–7.7)
NEUTROPHILS # BLD AUTO: 6.7 X10(3) UL (ref 1.5–7.7)
NEUTROPHILS NFR BLD AUTO: 74.9 %
NONHDLC SERPL-MCNC: 88 MG/DL
NONHDLC SERPL-MCNC: 88 MG/DL (ref ?–130)
OSMOLALITY SERPL CALC.SUM OF ELEC: 288 MOSM/KG (ref 275–295)
PLATELET # BLD AUTO: 380 10(3)UL (ref 150–450)
POTASSIUM SERPL-SCNC: 4.2 MMOL/L (ref 3.5–5.1)
RBC # BLD AUTO: 5.08 X10(6)UL (ref 3.8–5.3)
SARS-COV-2 RNA RESP QL NAA+PROBE: NOT DETECTED
SODIUM SERPL-SCNC: 139 MMOL/L (ref 136–145)
TRIGL SERPL-MCNC: 102 MG/DL
TRIGL SERPL-MCNC: 102 MG/DL (ref 30–149)
VLDLC SERPL CALC-MCNC: 20 MG/DL
VLDLC SERPL CALC-MCNC: 20 MG/DL (ref 0–30)
WBC # BLD AUTO: 9 X10(3) UL (ref 4–11)

## 2020-09-10 PROCEDURE — 78452 HT MUSCLE IMAGE SPECT MULT: CPT | Performed by: HOSPITALIST

## 2020-09-10 PROCEDURE — 93017 CV STRESS TEST TRACING ONLY: CPT | Performed by: HOSPITALIST

## 2020-09-10 PROCEDURE — 93306 TTE W/DOPPLER COMPLETE: CPT | Performed by: NURSE PRACTITIONER

## 2020-09-10 PROCEDURE — 99217 OBSERVATION CARE DISCHARGE: CPT | Performed by: HOSPITALIST

## 2020-09-10 PROCEDURE — 99213 OFFICE O/P EST LOW 20 MIN: CPT | Performed by: INTERNAL MEDICINE

## 2020-09-10 NOTE — PLAN OF CARE
PLAN TO MONITOR TROPONINS, MAY NEED STRESS TEST. NO COMPLAINTS AT THIS TIME, CALL LIGHT WITHIN REACH.   Problem: Patient Centered Care  Goal: Patient preferences are identified and integrated in the patient's plan of care  Description  Interventions:  - María increased pain with activity and pre-medicate as appropriate  Outcome: Progressing     Problem: SAFETY ADULT - FALL  Goal: Free from fall injury  Description  INTERVENTIONS:  - Assess pt frequently for physical needs  - Identify cognitive and physical defi skin integrity  - Monitor for areas of redness and/or skin breakdown  - Initiate interventions, skin care algorithm/standards of care as needed  Outcome: Progressing

## 2020-09-10 NOTE — PLAN OF CARE
Stress test done today, per Dr. Martin Kinds test was normal. Patient going home today.    Problem: Patient Centered Care  Goal: Patient preferences are identified and integrated in the patient's plan of care  Description  Interventions:  - What would you like us Anticipate increased pain with activity and pre-medicate as appropriate  Outcome: Adequate for Discharge     Problem: SAFETY ADULT - FALL  Goal: Free from fall injury  Description  INTERVENTIONS:  - Assess pt frequently for physical needs  - Identify cogni for pressure ulcer development  - Assess and document skin integrity  - Monitor for areas of redness and/or skin breakdown  - Initiate interventions, skin care algorithm/standards of care as needed  Outcome: Adequate for Discharge

## 2020-09-10 NOTE — PLAN OF CARE
Third troponin negative last night. Per Cardiology note, plan for stress test today. Denies any pain or discomfort overnight. Stress test diet in place. Will continue to monitor.     Problem: Patient Centered Care  Goal: Patient preferences are identified a MD/LIP if interventions unsuccessful or patient reports new pain  - Anticipate increased pain with activity and pre-medicate as appropriate  Outcome: Progressing     Problem: SAFETY ADULT - FALL  Goal: Free from fall injury  Description  INTERVENTIONS:  - document risk factors for pressure ulcer development  - Assess and document skin integrity  - Monitor for areas of redness and/or skin breakdown  - Initiate interventions, skin care algorithm/standards of care as needed  Outcome: Progressing

## 2020-09-10 NOTE — PROGRESS NOTES
[facility]  Progress Note    Nannette Kim Patient Status:  Observation    1940 MRN [id number]   Location [facility] 1W Attending Serge Zuniga,    Hosp Day # 0 PCP Jazz Reina MD, Jorge Childers MD     Assessment:    1.   Presentation w BUN 13 09/10/2020    CREATSERUM 1.01 09/10/2020     09/10/2020    CA 9.0 09/10/2020     Lab Results   Component Value Date    LDL 68 09/10/2020    HDL 61 09/10/2020    TRIG 102 09/10/2020    VLDL 20 09/10/2020     Lab Results   Component Value Date

## 2020-09-13 ENCOUNTER — HOSPITAL ENCOUNTER (OUTPATIENT)
Age: 80
Discharge: HOME | End: 2020-09-13
Attending: EMERGENCY MEDICINE
Payer: MEDICARE

## 2020-09-13 VITALS
HEART RATE: 74 BPM | BODY MASS INDEX: 35 KG/M2 | TEMPERATURE: 98 F | RESPIRATION RATE: 18 BRPM | SYSTOLIC BLOOD PRESSURE: 136 MMHG | WEIGHT: 210 LBS | OXYGEN SATURATION: 97 % | DIASTOLIC BLOOD PRESSURE: 67 MMHG

## 2020-09-13 DIAGNOSIS — N39.0 URINARY TRACT INFECTION WITHOUT HEMATURIA, SITE UNSPECIFIED: Primary | ICD-10-CM

## 2020-09-13 LAB
BILIRUB UR QL STRIP: NEGATIVE
COLOR UR: YELLOW
GLUCOSE UR STRIP-MCNC: NEGATIVE MG/DL
KETONES UR STRIP-MCNC: NEGATIVE MG/DL
NITRITE UR QL STRIP: NEGATIVE
PH UR STRIP: 5.5 [PH]
PROT UR STRIP-MCNC: 30 MG/DL
SP GR UR STRIP: 1.02
UROBILINOGEN UR STRIP-ACNC: <2 MG/DL

## 2020-09-13 PROCEDURE — 87086 URINE CULTURE/COLONY COUNT: CPT | Performed by: EMERGENCY MEDICINE

## 2020-09-13 PROCEDURE — 87186 SC STD MICRODIL/AGAR DIL: CPT | Performed by: EMERGENCY MEDICINE

## 2020-09-13 PROCEDURE — 99214 OFFICE O/P EST MOD 30 MIN: CPT

## 2020-09-13 PROCEDURE — 81002 URINALYSIS NONAUTO W/O SCOPE: CPT

## 2020-09-13 PROCEDURE — 87077 CULTURE AEROBIC IDENTIFY: CPT | Performed by: EMERGENCY MEDICINE

## 2020-09-13 RX ORDER — CEPHALEXIN 500 MG/1
500 CAPSULE ORAL 2 TIMES DAILY
Qty: 14 CAPSULE | Refills: 0 | Status: SHIPPED | OUTPATIENT
Start: 2020-09-13 | End: 2020-09-20

## 2020-09-13 NOTE — ED PROVIDER NOTES
Patient Seen in: 5 [facility]      History   Patient presents with:  Urinary Symptoms    Stated Complaint: TL-urinary symptoms    HPI    18-year-old female presents for evaluation of possible UTI.   Patient reports over the normal.   Neck:      Musculoskeletal: Normal range of motion and neck supple. No neck rigidity. Cardiovascular:      Rate and Rhythm: Normal rate. Pulmonary:      Effort: Pulmonary effort is normal. No respiratory distress.    Abdominal:      Palpations

## 2020-09-13 NOTE — ED INITIAL ASSESSMENT (HPI)
PATIENT REPORTS DYSURIA SINCE Thursday EVENING.   REPORTS INCREASING URINARY FREQUENCY AND URGENCY SINCE YESTERDAY AM.

## 2020-09-14 NOTE — DISCHARGE SUMMARY
[facility] - [facility]    Discharge Summary    Maynor Kerns Patient Status:  Observation    1940 MRN [id number]   Location [facility] 1W Attending No att. providers found   Hosp Day # 0 PCP Anshu Mckinney MD, Neo Vora MD     Date of Admis Chest x-ray showed no acute findings. The patient had large hiatal hernia noted on chest x-ray.            Hospital Course: Patient was seen by cardiology Dr. Donnie Oliva.   She underwent a nuclear stress test and echo which were normal.  Troponin was negative

## 2020-10-20 ENCOUNTER — HOSPITAL ENCOUNTER (EMERGENCY)
Facility: HOSPITAL | Age: 80
Discharge: HOME | End: 2020-10-20
Attending: EMERGENCY MEDICINE
Payer: MEDICARE

## 2020-10-20 VITALS
WEIGHT: 210 LBS | HEART RATE: 60 BPM | RESPIRATION RATE: 18 BRPM | TEMPERATURE: 98 F | SYSTOLIC BLOOD PRESSURE: 133 MMHG | HEIGHT: 65 IN | DIASTOLIC BLOOD PRESSURE: 56 MMHG | BODY MASS INDEX: 34.99 KG/M2 | OXYGEN SATURATION: 95 %

## 2020-10-20 DIAGNOSIS — N30.00 ACUTE CYSTITIS WITHOUT HEMATURIA: Primary | ICD-10-CM

## 2020-10-20 PROCEDURE — 82962 GLUCOSE BLOOD TEST: CPT

## 2020-10-20 PROCEDURE — 85025 COMPLETE CBC W/AUTO DIFF WBC: CPT | Performed by: EMERGENCY MEDICINE

## 2020-10-20 PROCEDURE — 80048 BASIC METABOLIC PNL TOTAL CA: CPT | Performed by: EMERGENCY MEDICINE

## 2020-10-20 PROCEDURE — 81001 URINALYSIS AUTO W/SCOPE: CPT | Performed by: EMERGENCY MEDICINE

## 2020-10-20 PROCEDURE — 87077 CULTURE AEROBIC IDENTIFY: CPT | Performed by: EMERGENCY MEDICINE

## 2020-10-20 PROCEDURE — 87086 URINE CULTURE/COLONY COUNT: CPT | Performed by: EMERGENCY MEDICINE

## 2020-10-20 PROCEDURE — 99283 EMERGENCY DEPT VISIT LOW MDM: CPT

## 2020-10-20 PROCEDURE — 87186 SC STD MICRODIL/AGAR DIL: CPT | Performed by: EMERGENCY MEDICINE

## 2020-10-20 PROCEDURE — 36415 COLL VENOUS BLD VENIPUNCTURE: CPT

## 2020-10-20 PROCEDURE — 80076 HEPATIC FUNCTION PANEL: CPT | Performed by: EMERGENCY MEDICINE

## 2020-10-20 RX ORDER — SULFAMETHOXAZOLE AND TRIMETHOPRIM 800; 160 MG/1; MG/1
1 TABLET ORAL 2 TIMES DAILY
Qty: 14 TABLET | Refills: 0 | Status: ON HOLD | OUTPATIENT
Start: 2020-10-20 | End: 2020-10-29

## 2020-10-20 NOTE — ED INITIAL ASSESSMENT (HPI)
Pt reports left eye discomfort and pain when touching it. This began a few days ago. Pt denies injury and denies any visual disturbances. Then this morning pt began to feel generalized weakness and very thirsty.  Pt describes it as feeling very \"icky this

## 2020-10-20 NOTE — ED PROVIDER NOTES
Patient Seen in: [facility] AND [facility] Emergency Department      History   Patient presents with:  Eye Problem  Weakness    Stated Complaint: left eye pain, feeling \"funny\"    HPI    Patient is an 78-year-old female who states yesterday her left eye bother Device None (Room air)       Current:/56   Pulse 56   Temp 97.5 °F (36.4 °C) (Oral)   Resp 18   Ht 165.1 cm (5' 5\")   Wt 95.3 kg   SpO2 96%   BMI 34.95 kg/m²         Physical Exam    Constitutional: Oriented to person, place, and time.  Appears well- (*)     GFR, -American 49 (*)     All other components within normal limits   HEPATIC FUNCTION PANEL (7) - Abnormal; Notable for the following components:    Alkaline Phosphatase 168 (*)     Albumin 3.1 (*)     All other components within normal riddle appointment as soon as possible for a visit in 3 days      We recommend that you schedule follow up care with a primary care provider within the next three months to obtain basic health screening including reassessment of your blood pressure.       Uvaldo Arroyo

## 2020-10-22 ENCOUNTER — HOSPITAL ENCOUNTER (EMERGENCY)
Facility: HOSPITAL | Age: 80
Discharge: HOME | End: 2020-10-22
Attending: EMERGENCY MEDICINE
Payer: MEDICARE

## 2020-10-22 ENCOUNTER — APPOINTMENT (OUTPATIENT)
Dept: GENERAL RADIOLOGY | Facility: HOSPITAL | Age: 80
End: 2020-10-22
Attending: EMERGENCY MEDICINE
Payer: MEDICARE

## 2020-10-22 VITALS
WEIGHT: 210 LBS | SYSTOLIC BLOOD PRESSURE: 129 MMHG | DIASTOLIC BLOOD PRESSURE: 54 MMHG | RESPIRATION RATE: 12 BRPM | HEART RATE: 59 BPM | HEIGHT: 65 IN | BODY MASS INDEX: 34.99 KG/M2 | TEMPERATURE: 98 F | OXYGEN SATURATION: 97 %

## 2020-10-22 DIAGNOSIS — E86.0 DEHYDRATION: Primary | ICD-10-CM

## 2020-10-22 DIAGNOSIS — N39.0 URINARY TRACT INFECTION WITHOUT HEMATURIA, SITE UNSPECIFIED: ICD-10-CM

## 2020-10-22 PROCEDURE — 96361 HYDRATE IV INFUSION ADD-ON: CPT

## 2020-10-22 PROCEDURE — 93005 ELECTROCARDIOGRAM TRACING: CPT

## 2020-10-22 PROCEDURE — 99285 EMERGENCY DEPT VISIT HI MDM: CPT

## 2020-10-22 PROCEDURE — 71045 X-RAY EXAM CHEST 1 VIEW: CPT | Performed by: EMERGENCY MEDICINE

## 2020-10-22 PROCEDURE — 80048 BASIC METABOLIC PNL TOTAL CA: CPT | Performed by: EMERGENCY MEDICINE

## 2020-10-22 PROCEDURE — 85025 COMPLETE CBC W/AUTO DIFF WBC: CPT | Performed by: EMERGENCY MEDICINE

## 2020-10-22 PROCEDURE — 93010 ELECTROCARDIOGRAM REPORT: CPT | Performed by: EMERGENCY MEDICINE

## 2020-10-22 PROCEDURE — 84484 ASSAY OF TROPONIN QUANT: CPT | Performed by: EMERGENCY MEDICINE

## 2020-10-22 PROCEDURE — 96360 HYDRATION IV INFUSION INIT: CPT

## 2020-10-22 RX ORDER — CEPHALEXIN 500 MG/1
500 CAPSULE ORAL 2 TIMES DAILY
Qty: 14 CAPSULE | Refills: 0 | Status: ON HOLD | OUTPATIENT
Start: 2020-10-22 | End: 2020-10-29

## 2020-10-22 NOTE — ED PROVIDER NOTES
Patient Seen in: [facility] AND [facility] Emergency Department      History   Patient presents with:  Weakness    Stated Complaint: Burning feeling in chest    HPI    64-year-old female with history of hypertension, hypercholesterolemia, coronary artery disease tobacco: Never Used    Alcohol use: No      Alcohol/week: 0.0 standard drinks    Drug use: No             Review of Systems    Positive for stated complaint: Burning feeling in chest  Other systems are as noted in HPI.   Constitutional and vital signs revie Normal   CBC WITH DIFFERENTIAL WITH PLATELET    Narrative: The following orders were created for panel order CBC WITH DIFFERENTIAL WITH PLATELET.   Procedure                               Abnormality         Status                     ---------

## 2020-10-22 NOTE — ED INITIAL ASSESSMENT (HPI)
Patient states I'm here for the same reason as Tuesday.  UTI symptoms of weakness, also c/o burning in the chest. She states she doubled her acid reflux medications yesterday per PCP recommendation

## 2020-10-23 ENCOUNTER — APPOINTMENT (OUTPATIENT)
Dept: CT IMAGING | Facility: HOSPITAL | Age: 80
End: 2020-10-23
Attending: EMERGENCY MEDICINE
Payer: MEDICARE

## 2020-10-23 ENCOUNTER — HOSPITAL ENCOUNTER (EMERGENCY)
Facility: HOSPITAL | Age: 80
Discharge: HOME | End: 2020-10-23
Attending: EMERGENCY MEDICINE
Payer: MEDICARE

## 2020-10-23 VITALS
HEART RATE: 68 BPM | WEIGHT: 210 LBS | RESPIRATION RATE: 18 BRPM | BODY MASS INDEX: 34.99 KG/M2 | OXYGEN SATURATION: 96 % | HEIGHT: 65 IN | DIASTOLIC BLOOD PRESSURE: 60 MMHG | TEMPERATURE: 98 F | SYSTOLIC BLOOD PRESSURE: 124 MMHG

## 2020-10-23 DIAGNOSIS — R11.0 NAUSEA: Primary | ICD-10-CM

## 2020-10-23 DIAGNOSIS — R10.13 EPIGASTRIC PAIN: ICD-10-CM

## 2020-10-23 PROCEDURE — 99285 EMERGENCY DEPT VISIT HI MDM: CPT

## 2020-10-23 PROCEDURE — 83690 ASSAY OF LIPASE: CPT | Performed by: EMERGENCY MEDICINE

## 2020-10-23 PROCEDURE — 96375 TX/PRO/DX INJ NEW DRUG ADDON: CPT

## 2020-10-23 PROCEDURE — 85025 COMPLETE CBC W/AUTO DIFF WBC: CPT | Performed by: EMERGENCY MEDICINE

## 2020-10-23 PROCEDURE — 84484 ASSAY OF TROPONIN QUANT: CPT | Performed by: EMERGENCY MEDICINE

## 2020-10-23 PROCEDURE — 74176 CT ABD & PELVIS W/O CONTRAST: CPT | Performed by: EMERGENCY MEDICINE

## 2020-10-23 PROCEDURE — 80076 HEPATIC FUNCTION PANEL: CPT | Performed by: EMERGENCY MEDICINE

## 2020-10-23 PROCEDURE — 96374 THER/PROPH/DIAG INJ IV PUSH: CPT

## 2020-10-23 PROCEDURE — 96361 HYDRATE IV INFUSION ADD-ON: CPT

## 2020-10-23 PROCEDURE — 80048 BASIC METABOLIC PNL TOTAL CA: CPT | Performed by: EMERGENCY MEDICINE

## 2020-10-23 RX ORDER — ONDANSETRON 4 MG/1
4 TABLET, ORALLY DISINTEGRATING ORAL EVERY 8 HOURS PRN
Qty: 15 TABLET | Refills: 0 | Status: SHIPPED | OUTPATIENT
Start: 2020-10-23 | End: 2020-10-30

## 2020-10-23 RX ORDER — ONDANSETRON 2 MG/ML
4 INJECTION INTRAMUSCULAR; INTRAVENOUS ONCE
Status: COMPLETED | OUTPATIENT
Start: 2020-10-23 | End: 2020-10-23

## 2020-10-23 RX ORDER — METOCLOPRAMIDE HYDROCHLORIDE 5 MG/ML
5 INJECTION INTRAMUSCULAR; INTRAVENOUS ONCE
Status: COMPLETED | OUTPATIENT
Start: 2020-10-23 | End: 2020-10-23

## 2020-10-23 NOTE — ED INITIAL ASSESSMENT (HPI)
Pt c/o N/fatigue since Tuesday, pt has been taking abx for UTI tx, had to switch to different abx due to rxn. Pt denies any vomiting, diarrhea, fevers, chills.

## 2020-10-23 NOTE — ED PROVIDER NOTES
Patient Seen in: [facility] AND [facility] Emergency Department      History   Patient presents with:  Nausea/vomiting    Stated Complaint: fatigue, vomiting    HPI     78year old female with multiple medical issues including CAD with stents, hypertension, high negative except as noted above.     Physical Exam     ED Triage Vitals [10/23/20 0721]   /71   Pulse 62   Resp 20   Temp 97.3 °F (36.3 °C)   Temp src Temporal   SpO2 97 %   O2 Device None (Room air)       Current:/60   Pulse 68   Temp 97.8 °F (3 -American 43 (*)     All other components within normal limits   HEPATIC FUNCTION PANEL (7) - Abnormal; Notable for the following components:    Alkaline Phosphatase 150 (*)     Albumin 3.1 (*)     All other components within normal limits   CBC W/ (REGLAN) injection 5 mg (5 mg Intravenous Given 10/23/20 0841)     Pt is feeling better.  Although this is her third visit this week she appears improved, labs reassuring except for mild renal insufficiency similar to yesterday, vss, and no acute findings o

## 2020-10-26 ENCOUNTER — HOSPITAL ENCOUNTER (INPATIENT)
Facility: HOSPITAL | Age: 80
LOS: 2 days | Discharge: HOME | DRG: 392 | End: 2020-10-29
Attending: EMERGENCY MEDICINE | Admitting: HOSPITALIST
Payer: MEDICARE

## 2020-10-26 ENCOUNTER — APPOINTMENT (OUTPATIENT)
Dept: GENERAL RADIOLOGY | Facility: HOSPITAL | Age: 80
DRG: 392 | End: 2020-10-26
Payer: MEDICARE

## 2020-10-26 DIAGNOSIS — R07.9 ACUTE CHEST PAIN: Primary | ICD-10-CM

## 2020-10-26 PROCEDURE — 71045 X-RAY EXAM CHEST 1 VIEW: CPT | Performed by: EMERGENCY MEDICINE

## 2020-10-26 PROCEDURE — 99219 INITIAL OBSERVATION CARE,LEVL II: CPT | Performed by: HOSPITALIST

## 2020-10-26 RX ORDER — HEPARIN SODIUM 5000 [USP'U]/ML
5000 INJECTION, SOLUTION INTRAVENOUS; SUBCUTANEOUS EVERY 12 HOURS SCHEDULED
Status: DISCONTINUED | OUTPATIENT
Start: 2020-10-26 | End: 2020-10-29

## 2020-10-26 RX ORDER — TEMAZEPAM 7.5 MG/1
7.5 CAPSULE ORAL NIGHTLY PRN
Status: DISCONTINUED | OUTPATIENT
Start: 2020-10-26 | End: 2020-10-29

## 2020-10-26 RX ORDER — SODIUM CHLORIDE 0.9 % (FLUSH) 0.9 %
3 SYRINGE (ML) INJECTION AS NEEDED
Status: DISCONTINUED | OUTPATIENT
Start: 2020-10-26 | End: 2020-10-29

## 2020-10-26 RX ORDER — NITROGLYCERIN 0.4 MG/1
0.4 TABLET SUBLINGUAL EVERY 5 MIN PRN
Status: DISCONTINUED | OUTPATIENT
Start: 2020-10-26 | End: 2020-10-29

## 2020-10-26 RX ORDER — METOCLOPRAMIDE HYDROCHLORIDE 5 MG/ML
5 INJECTION INTRAMUSCULAR; INTRAVENOUS EVERY 8 HOURS PRN
Status: DISCONTINUED | OUTPATIENT
Start: 2020-10-26 | End: 2020-10-29

## 2020-10-26 RX ORDER — ONDANSETRON 2 MG/ML
4 INJECTION INTRAMUSCULAR; INTRAVENOUS EVERY 6 HOURS PRN
Status: DISCONTINUED | OUTPATIENT
Start: 2020-10-26 | End: 2020-10-29

## 2020-10-26 RX ORDER — ASPIRIN 81 MG/1
324 TABLET, CHEWABLE ORAL ONCE
Status: COMPLETED | OUTPATIENT
Start: 2020-10-26 | End: 2020-10-26

## 2020-10-26 RX ORDER — ACETAMINOPHEN 325 MG/1
650 TABLET ORAL EVERY 6 HOURS PRN
Status: DISCONTINUED | OUTPATIENT
Start: 2020-10-26 | End: 2020-10-29

## 2020-10-26 NOTE — ED PROVIDER NOTES
Patient Seen in: [facility] AND [facility] Emergency Department      History   Patient presents with:  Chest Pain Angina    Stated Complaint: CP    HPI    78year old female with CAD s/p stents, HTN, GERD, HTN, HCL, h/o PUD, skin cancer who again presents with e noted in HPI. Constitutional and vital signs reviewed. All other systems reviewed and negative except as noted above.     Physical Exam     ED Triage Vitals [10/26/20 1738]   /62   Pulse 61   Resp 20   Temp 98.8 °F (37.1 °C)   Temp src Temporal (*)     Creatinine 1.19 (*)     BUN/CREA Ratio 7.6 (*)     GFR, Non- 43 (*)     GFR, -American 49 (*)     All other components within normal limits   CBC W/ DIFFERENTIAL - Abnormal; Notable for the following components:    MCH 25.5 ( several cardiac risk factors and history of stents who presents for her fourth visit to the ER this week with the same epigastric and lower chest pain.   Initially the symptoms were thought to be secondary to her UTI and intolerance of her antibiotics along

## 2020-10-27 PROBLEM — N17.9 AKI (ACUTE KIDNEY INJURY): Status: ACTIVE | Noted: 2020-10-27

## 2020-10-27 PROBLEM — N17.9 AKI (ACUTE KIDNEY INJURY) (HCC): Status: ACTIVE | Noted: 2020-10-27

## 2020-10-27 LAB
ERYTHROCYTE [DISTWIDTH] IN BLOOD BY AUTOMATED COUNT: 47.8 %
ERYTHROCYTE [DISTWIDTH] IN BLOOD: 16.1 %
HCT VFR BLD CALC: 39.2 %
HGB BLD-MCNC: 12.1 G/DL
MCH RBC QN AUTO: 25.2 PG
MCHC RBC AUTO-ENTMCNC: 30.9 G/DL
MCV RBC AUTO: 81.7 FL
PLATELET # BLD: 426 K/MCL
RBC # BLD: 4.8 10*6/UL
WBC # BLD: 7.2 K/MCL

## 2020-10-27 PROCEDURE — 99221 1ST HOSP IP/OBS SF/LOW 40: CPT | Performed by: INTERNAL MEDICINE

## 2020-10-27 PROCEDURE — 99233 SBSQ HOSP IP/OBS HIGH 50: CPT | Performed by: INTERNAL MEDICINE

## 2020-10-27 RX ORDER — AMLODIPINE BESYLATE 5 MG/1
5 TABLET ORAL DAILY
Status: DISCONTINUED | OUTPATIENT
Start: 2020-10-27 | End: 2020-10-29

## 2020-10-27 RX ORDER — CALCIUM CARBONATE 200(500)MG
1000 TABLET,CHEWABLE ORAL 3 TIMES DAILY PRN
Status: DISCONTINUED | OUTPATIENT
Start: 2020-10-27 | End: 2020-10-29

## 2020-10-27 RX ORDER — CALCIUM CARBONATE 200(500)MG
1000 TABLET,CHEWABLE ORAL ONCE
Status: COMPLETED | OUTPATIENT
Start: 2020-10-27 | End: 2020-10-27

## 2020-10-27 RX ORDER — CHLORHEXIDINE GLUCONATE 4 G/100ML
30 SOLUTION TOPICAL
Status: COMPLETED | OUTPATIENT
Start: 2020-10-28 | End: 2020-10-28

## 2020-10-27 RX ORDER — ASPIRIN 81 MG/1
81 TABLET, CHEWABLE ORAL DAILY
Status: DISCONTINUED | OUTPATIENT
Start: 2020-10-27 | End: 2020-10-29

## 2020-10-27 RX ORDER — SODIUM CHLORIDE 9 MG/ML
INJECTION, SOLUTION INTRAVENOUS CONTINUOUS
Status: DISCONTINUED | OUTPATIENT
Start: 2020-10-27 | End: 2020-10-29

## 2020-10-27 RX ORDER — ATORVASTATIN CALCIUM 40 MG/1
40 TABLET, FILM COATED ORAL NIGHTLY
Status: DISCONTINUED | OUTPATIENT
Start: 2020-10-27 | End: 2020-10-29

## 2020-10-27 RX ORDER — SODIUM CHLORIDE 9 MG/ML
INJECTION, SOLUTION INTRAVENOUS
Status: ACTIVE | OUTPATIENT
Start: 2020-10-28 | End: 2020-10-28

## 2020-10-27 RX ORDER — METOPROLOL SUCCINATE 25 MG/1
25 TABLET, EXTENDED RELEASE ORAL
Status: DISCONTINUED | OUTPATIENT
Start: 2020-10-28 | End: 2020-10-27

## 2020-10-27 RX ORDER — CLOPIDOGREL BISULFATE 75 MG/1
75 TABLET ORAL DAILY
Status: DISCONTINUED | OUTPATIENT
Start: 2020-10-27 | End: 2020-10-29

## 2020-10-27 NOTE — PLAN OF CARE
Patient resting in bed. Alert & orientedx4. Complained of burning pain to chest this morning after breakfast- cardiology and Dr Ascencion Chavez notified, new orders received for tums. Plan for angiogram tomorrow. Consent signed. Call light in reach.    Problem: P bleeding and/or hematoma  - Assess quality of pulses, skin color and temperature  - Assess for signs of decreased coronary artery perfusion - ex.  Angina  - Evaluate fluid balance, assess for edema, trend weights  Outcome: Progressing

## 2020-10-27 NOTE — PLAN OF CARE
Problem: Patient Centered Care  Goal: Patient preferences are identified and integrated in the patient's plan of care  Description: Interventions:  - What would you like us to know as we care for you?  I live in a condo alone, my daughter lives down the b interventions  Outcome: Not Progressing   Patient denies pain or shortness of breath. Troponin negative x2, next lab draw early this morning.  Cardiology consult to see patient today

## 2020-10-27 NOTE — ED NOTES
Pt rpeorts persistent chest burning. States she has been to the ED for similar symptoms 4 times in the past week. Pt states she feels like something is not right. Patient placed on cardiac monitor and pulse Ox.  Positioned on cart for comfort, call light w

## 2020-10-27 NOTE — H&P
[facility]    PATIENT'S NAME: Kianna Huynh   ATTENDING PHYSICIAN: Deysi Edmondson MD   PATIENT ACCOUNT#:   [de-identified]    LOCATION:  [place] [address]  MEDICAL RECORD #:   [id number]       YOB: 1940  ADMISSION DATE:       10/2 patient does have a sedentary lifestyle. Other 12-point review of systems negative. No jaw pain or shoulder pain. The patient reported that this discomfort is similar to the discomfort she had last time she had a stent in 2017.         PHYSICAL EXAMINATI

## 2020-10-27 NOTE — HOME CARE LIAISON
Received referral from Luke for [facility]. Spoke w/ pt in regards to [facility] services. Pt declining HH at this time. Notified Luke and Jonathan.

## 2020-10-27 NOTE — ED NOTES
Orders for admission, patient is aware of plan and ready to go upstairs. Any questions, please call ED RN Catalino Chang extension [phone].       Type of COVID test sent: Rapid  COVID Suspicion level: low, not detected     Titratable drug(s) infusing: none  Rate: na

## 2020-10-27 NOTE — CONSULTS
[facility] Cardiology Consult Note    [place] Garcia Patient Status:  Observation    1940 MRN [id number]   Location [facility] 3W/SW Attending Dustin Haas MD   Hosp Day # 0 PCP Eric Burgos MD, Jose Guzman MD     78year old female, consulted cholesterol    • History of stomach ulcers    • Osteoarthritis    • Squamous carcinoma 2001    right cheek   • Squamous cell carcinoma 1997    lateral left cheek     Past Surgical History:   Procedure Laterality Date   • ANGIOPLASTY (CORONARY)  2013    mul

## 2020-10-27 NOTE — CM/SW NOTE
Received MDO for Advanced Directives. Per RN rounds, pt may benefit from [facility] services at time of d/c. UNIQUE initiated referral w/ Yee from [facility] during rounds. Received update from Arnold 4 - pt declined [facility] services. SW met w/ pt in her room.  Pt verified her add

## 2020-10-27 NOTE — PROGRESS NOTES
[place] [facility]    Progress Note    Lauro Almanza Patient Status:  Observation    1940 MRN [id number]   Location [facility] 3W/SW Attending Kacy Guillory MD   Hosp Day # 0 PCP Nurys Arevalo MD, Angy Coffman MD       Subjective:   Ad Manjarrez Current PRN Inpatient Meds:      Normal Saline Flush, acetaminophen, ondansetron HCl, Metoclopramide HCl, temazepam, nitroGLYCERIN    Results:     Recent Labs   Lab 10/23/20  0732 10/26/20  1825 10/27/20  0601   RBC 4.91 5.01 4.80   HGB 12.5 12.8 12. Large retrocardiac hiatal hernia unchanged.     Dictated by (CST): Agustín Arias MD on 10/22/2020 at 8:31 AM     Finalized by (CST): Augstín Arias MD on 10/22/2020 at 8:33 AM             Ekg 12-lead    Result Date: 10/26/2020  ECG Report  Interpretation

## 2020-10-28 ENCOUNTER — APPOINTMENT (OUTPATIENT)
Dept: INTERVENTIONAL RADIOLOGY/VASCULAR | Facility: HOSPITAL | Age: 80
DRG: 392 | End: 2020-10-28
Attending: INTERNAL MEDICINE
Payer: MEDICARE

## 2020-10-28 PROCEDURE — 4A023N7 MEASUREMENT OF CARDIAC SAMPLING AND PRESSURE, LEFT HEART, PERCUTANEOUS APPROACH: ICD-10-PCS | Performed by: INTERNAL MEDICINE

## 2020-10-28 PROCEDURE — B2111ZZ FLUOROSCOPY OF MULTIPLE CORONARY ARTERIES USING LOW OSMOLAR CONTRAST: ICD-10-PCS | Performed by: INTERNAL MEDICINE

## 2020-10-28 PROCEDURE — 99233 SBSQ HOSP IP/OBS HIGH 50: CPT | Performed by: INTERNAL MEDICINE

## 2020-10-28 PROCEDURE — 93458 L HRT ARTERY/VENTRICLE ANGIO: CPT | Performed by: INTERNAL MEDICINE

## 2020-10-28 PROCEDURE — B41F1ZZ FLUOROSCOPY OF RIGHT LOWER EXTREMITY ARTERIES USING LOW OSMOLAR CONTRAST: ICD-10-PCS | Performed by: INTERNAL MEDICINE

## 2020-10-28 RX ORDER — LIDOCAINE HYDROCHLORIDE 20 MG/ML
INJECTION, SOLUTION EPIDURAL; INFILTRATION; INTRACAUDAL; PERINEURAL
Status: COMPLETED
Start: 2020-10-28 | End: 2020-10-28

## 2020-10-28 RX ORDER — ASPIRIN 81 MG/1
243 TABLET, CHEWABLE ORAL ONCE
Status: COMPLETED | OUTPATIENT
Start: 2020-10-28 | End: 2020-10-28

## 2020-10-28 RX ORDER — MIDAZOLAM HYDROCHLORIDE 1 MG/ML
INJECTION INTRAMUSCULAR; INTRAVENOUS
Status: COMPLETED
Start: 2020-10-28 | End: 2020-10-28

## 2020-10-28 RX ORDER — NITROGLYCERIN 20 MG/100ML
INJECTION INTRAVENOUS
Status: DISCONTINUED
Start: 2020-10-28 | End: 2020-10-28 | Stop reason: WASHOUT

## 2020-10-28 RX ORDER — SUCRALFATE ORAL 1 G/10ML
1 SUSPENSION ORAL
Status: DISCONTINUED | OUTPATIENT
Start: 2020-10-28 | End: 2020-10-29

## 2020-10-28 RX ORDER — ASPIRIN 81 MG/1
TABLET, CHEWABLE ORAL
Status: COMPLETED
Start: 2020-10-28 | End: 2020-10-28

## 2020-10-28 RX ORDER — NYSTATIN 100000 U/G
OINTMENT TOPICAL 3 TIMES DAILY PRN
Status: DISCONTINUED | OUTPATIENT
Start: 2020-10-28 | End: 2020-10-29

## 2020-10-28 NOTE — PROCEDURES
[facility]    PATIENT'S NAME: [place] Sprague   ATTENDING PHYSICIAN: Keron Noland MD   OPERATING PHYSICIAN: Jt Villavicencio.  Esdras Cooper MD   PATIENT ACCOUNT#:   [id number]    LOCATION:  [place] [address]  MEDICAL RECORD #:   [id number] moderate-sized diagonal from its midportion, and these are free of significant disease. The circumflex artery is nondominant. There is a narrowing of about 25% to 30% in the proximal to mid-circumflex. The circumflex is otherwise free of disease.   The

## 2020-10-28 NOTE — PROGRESS NOTES
[facility]    Progress Note    [place] Figures Patient Status:  Observation    1940 MRN [id number]   Location [facility] 3W/SW Attending Brian Mazariegos MD   Hosp Day # 1 PCP Deep Guillaume MD, Yunior River MD       Subjective:   Marilyn Meek 5,000 Units Subcutaneous Q12H [facility]       Current PRN Inpatient Meds:      Calcium Carbonate Antacid, Normal Saline Flush, acetaminophen, ondansetron HCl, Metoclopramide HCl, temazepam, nitroGLYCERIN    Results:     Recent Labs   Lab 10/23/20  0732 10/26/20 Xr Chest Ap Portable  (cpt=71045)    Result Date: 10/22/2020  CONCLUSION:  1. No acute cardiopulmonary finding. 2. Large retrocardiac hiatal hernia unchanged.     Dictated by (CST): Anna Luke MD on 10/22/2020 at 8:31 AM     Finalized by (CST):

## 2020-10-28 NOTE — PROCEDURES
Preop: CAD, chest pain  Postop: Same  Procedure: Left heart cath, coronary angiogram.  Angio-Seal to RFA. Findings: Stents in proximal LAD and mid circumflex widely patent. No significant CAD in left coronary artery.   Tubular mild stenosis in proximal

## 2020-10-28 NOTE — PLAN OF CARE
Problem: Patient Centered Care  Goal: Patient preferences are identified and integrated in the patient's plan of care  Description: Interventions:  - What would you like us to know as we care for you?  I live in a condo alone, my daughter lives down the b of chest pain this shift. Patient prepped for cardiac cath. NPO since midnight. Plan for cardiac cath today. IVF infusing.

## 2020-10-28 NOTE — PLAN OF CARE
Patient resting in bed. Alert & orientedx4. On room air. Went for angiogram today. Right groin site clean, dry, intact, no hematoma. Ambulated to bathroom after post-cath bedrest and tolerated well. Continuing IV fluids. Awaiting GI consult.  Fall precautio arterial and/or venous puncture sites for bleeding and/or hematoma  - Assess quality of pulses, skin color and temperature  - Assess for signs of decreased coronary artery perfusion - ex.  Angina  - Evaluate fluid balance, assess for edema, trend weights  O

## 2020-10-28 NOTE — IVS NOTE
Procedure hand off report given to Valentin Arce RN. Procedure site remains dry and intact with no signs and symptoms of bleeding and hematoma. Bedrest maintained. Dr Bishop Munoz spoke with pt post procedure.

## 2020-10-29 ENCOUNTER — ANESTHESIA EVENT (OUTPATIENT)
Dept: ENDOSCOPY | Facility: HOSPITAL | Age: 80
DRG: 392 | End: 2020-10-29
Payer: MEDICARE

## 2020-10-29 ENCOUNTER — ANESTHESIA (OUTPATIENT)
Dept: ENDOSCOPY | Facility: HOSPITAL | Age: 80
DRG: 392 | End: 2020-10-29
Payer: MEDICARE

## 2020-10-29 VITALS
TEMPERATURE: 98 F | WEIGHT: 216.81 LBS | OXYGEN SATURATION: 96 % | HEART RATE: 75 BPM | BODY MASS INDEX: 36.12 KG/M2 | DIASTOLIC BLOOD PRESSURE: 65 MMHG | HEIGHT: 65 IN | RESPIRATION RATE: 18 BRPM | SYSTOLIC BLOOD PRESSURE: 156 MMHG

## 2020-10-29 LAB
ANION GAP SERPL CALC-SCNC: 5 MMOL/L
BUN SERPL-MCNC: 11 MG/DL
BUN/CREAT SERPL: 11
CALCIUM SERPL-MCNC: 8.3 MG/DL
CHLORIDE SERPL-SCNC: 111 MMOL/L
CO2 SERPL-SCNC: 27 MMOL/L
CREAT SERPL-MCNC: 1 MG/DL
ERYTHROCYTE [DISTWIDTH] IN BLOOD: 16.4 %
GLUCOSE SERPL-MCNC: 88 MG/DL
HCT VFR BLD CALC: 35.3 %
HGB BLD-MCNC: 10.8 G/DL
MCH RBC QN AUTO: 25.5 PG
MCHC RBC AUTO-ENTMCNC: 30.6 G/DL
MCV RBC AUTO: 83.5 FL
PLATELET # BLD: 378 K/MCL
POTASSIUM SERPL-SCNC: 4.4 MMOL/L
RBC # BLD: 4.23 10*6/UL
SODIUM SERPL-SCNC: 143 MMOL/L
WBC # BLD: 6.7 K/MCL

## 2020-10-29 PROCEDURE — 0DB38ZX EXCISION OF LOWER ESOPHAGUS, VIA NATURAL OR ARTIFICIAL OPENING ENDOSCOPIC, DIAGNOSTIC: ICD-10-PCS | Performed by: INTERNAL MEDICINE

## 2020-10-29 PROCEDURE — 99239 HOSP IP/OBS DSCHRG MGMT >30: CPT | Performed by: INTERNAL MEDICINE

## 2020-10-29 PROCEDURE — 99232 SBSQ HOSP IP/OBS MODERATE 35: CPT | Performed by: INTERNAL MEDICINE

## 2020-10-29 PROCEDURE — 0DB68ZX EXCISION OF STOMACH, VIA NATURAL OR ARTIFICIAL OPENING ENDOSCOPIC, DIAGNOSTIC: ICD-10-PCS | Performed by: INTERNAL MEDICINE

## 2020-10-29 RX ORDER — NALOXONE HYDROCHLORIDE 0.4 MG/ML
80 INJECTION, SOLUTION INTRAMUSCULAR; INTRAVENOUS; SUBCUTANEOUS AS NEEDED
Status: DISCONTINUED | OUTPATIENT
Start: 2020-10-29 | End: 2020-10-29

## 2020-10-29 RX ORDER — SUCRALFATE ORAL 1 G/10ML
1 SUSPENSION ORAL
Qty: 1200 ML | Refills: 0 | Status: SHIPPED | OUTPATIENT
Start: 2020-10-29 | End: 2020-11-28

## 2020-10-29 RX ORDER — LIDOCAINE HYDROCHLORIDE 10 MG/ML
INJECTION, SOLUTION EPIDURAL; INFILTRATION; INTRACAUDAL; PERINEURAL AS NEEDED
Status: DISCONTINUED | OUTPATIENT
Start: 2020-10-29 | End: 2020-10-29 | Stop reason: SURG

## 2020-10-29 RX ORDER — CALCIUM CARBONATE 200(500)MG
1 TABLET,CHEWABLE ORAL 3 TIMES DAILY PRN
Refills: 0 | Status: SHIPPED | COMMUNITY
Start: 2020-10-29

## 2020-10-29 RX ORDER — SODIUM CHLORIDE, SODIUM LACTATE, POTASSIUM CHLORIDE, CALCIUM CHLORIDE 600; 310; 30; 20 MG/100ML; MG/100ML; MG/100ML; MG/100ML
INJECTION, SOLUTION INTRAVENOUS CONTINUOUS
Status: DISCONTINUED | OUTPATIENT
Start: 2020-10-29 | End: 2020-10-29

## 2020-10-29 RX ORDER — AMLODIPINE BESYLATE 5 MG/1
5 TABLET ORAL DAILY
Qty: 30 TABLET | Refills: 2 | Status: SHIPPED | OUTPATIENT
Start: 2020-10-30 | End: 2021-08-06 | Stop reason: DRUGHIGH

## 2020-10-29 RX ORDER — PANTOPRAZOLE SODIUM 40 MG/1
40 TABLET, DELAYED RELEASE ORAL
Qty: 60 TABLET | Refills: 0 | Status: SHIPPED | OUTPATIENT
Start: 2020-10-29 | End: 2020-11-28

## 2020-10-29 RX ADMIN — SODIUM CHLORIDE: 9 INJECTION, SOLUTION INTRAVENOUS at 12:10:00

## 2020-10-29 RX ADMIN — LIDOCAINE HYDROCHLORIDE 50 MG: 10 INJECTION, SOLUTION EPIDURAL; INFILTRATION; INTRACAUDAL; PERINEURAL at 11:59:00

## 2020-10-29 RX ADMIN — SODIUM CHLORIDE: 9 INJECTION, SOLUTION INTRAVENOUS at 11:56:00

## 2020-10-29 NOTE — OPERATIVE REPORT
EGD Operative Report    Kathie Herrmann Patient Status:  Inpatient    1940 MRN [id number]   [employer] complications and was transferred to the recovery area in stable condition. Findings:    ESOPHAGUS: Esophageal mucosa appeared grossly normal.  There was a nonobstructive Schatzki's ring present at the GE junction. The scope passes freely.   The opening p

## 2020-10-29 NOTE — DISCHARGE SUMMARY
[facility]    Discharge Summary    Nava Alcantara Patient Status:  Inpatient    1940 MRN [id number]   Location [facility] 3W/SW Attending Farzaneh Kohler MD   Hosp Day # 2 PCP Slike Douglass MD, Weston Hansen MD     Date of Admis Awake and alert, in no acute distress. HEENT: Normocephalic. Extraocular muscles were intact. PERRL. NECK:  Supple. Trach midline  CHEST:  Symmetrical movement on inspiration  HEART:  S1 and S2 heard. RRR   LUNGS:  Good air entry.   No crackles or whee 10/23/2020    AST 21 10/23/2020    ALT 19 10/23/2020    PTT 36.4 (H) 01/16/2017    INR 1.0 01/16/2017     10/23/2020    TROP <0.045 10/27/2020    CK 71 08/24/2019       Recent Labs   Lab 10/23/20  0732 10/26/20  1825 10/27/20  0601 10/28/20  0559 10 amLODIPine Besylate 5 MG Tabs  Commonly known as: Leoncio Ceballos  Start taking on: October 30, 2020  What changed:   · medication strength  · how much to take      Take 1 tablet (5 mg total) by mouth daily.    Quantity: 30 tablet  Refills: 2     Pantoprazole Sod each of these medications  · sucralfate 1 GM/10ML Susp         Follow up Visits  Monica Castillo  [address]  [address] [facility] [address] [phone]    On 11/3/2020  @2:30     Lyla Blandon MD  [address] [facility] [id number]

## 2020-10-29 NOTE — ANESTHESIA PREPROCEDURE EVALUATION
Anesthesia PreOp Note    HPI:     Lauro Almanza is a 78year old female who presents for preoperative consultation requested by: Marleen العراقي MD    Date of Surgery: 10/26/2020 - 10/29/2020    Procedure(s):  ESOPHAGOGASTRODUODENOSCOPY (EGD)  Brenna •  cephALEXin (KEFLEX) 500 MG Oral Cap, Take 1 capsule (500 mg total) by mouth 2 (two) times daily for 7 days. (Patient taking differently: Take 500 mg by mouth 2 (two) times daily.  Started 10/21/2020  Last dose 10/28/2020 ), Disp: 14 capsule, Rfl: 0, 10/2 •  atorvastatin (LIPITOR) tab 40 mg, 40 mg, Oral, Nightly, Yi Catherine Si, MD, 40 mg at 10/28/20 2031    •  0.9% NaCl infusion, , Intravenous, Continuous, Tino Catherine MD, Last Rate: 83 mL/hr at 10/29/20 0924    •  amLODIPine Besylate (NORVASC) Non-medical: Not on file    Tobacco Use      Smoking status: Never Smoker      Smokeless tobacco: Never Used    Substance and Sexual Activity      Alcohol use: No        Alcohol/week: 0.0 standard drinks      Drug use: No      Sexual activity: Not on RDW 16.4 (H) 10/29/2020    .0 10/29/2020     Lab Results   Component Value Date     10/29/2020    K 4.4 10/29/2020     10/29/2020    CO2 27.0 10/29/2020    BUN 11 10/29/2020    CREATSERUM 1.00 10/29/2020    GLU 88 10/29/2020    PGLU 162

## 2020-10-29 NOTE — ANESTHESIA POSTPROCEDURE EVALUATION
Patient: Kat Fentonstein    Procedure Summary     Date: 10/29/20 Room / Location: [address] [place] [address] ENDOSCOPY 01 / [address] [place] [address] ENDOSCOPY    Anesthesia Start: 3011 Anesthesia Stop: 5298    Procedure: ESOPHAGOGASTRODUODENOSCOPY (EGD) (N/A ) Diagnosis: (Para esophageal  HERNI

## 2020-10-29 NOTE — CONSULTS
[facility]    Report of Consultation    Maynor Kerns Patient Status:  Inpatient    1940 MRN [id number]   Location [facility] ENDOSCOPY LAB SUITES Attending Fabio Cho MD   Hosp Day # 2 PCP Anshu Mckinney MD, Neo Vora MD Father         CVA   • Lung Disorder Father         lung disease   • Heart Disease Father    • Breast Cancer Mother    • Hypertension Mother    • Arthritis Daughter         rheumatoid   • Other (benign brain lesion) Son        Social History  Patient Guard Sodium 40 MG Oral Tab EC, Take 40 mg by mouth every morning before breakfast.      •  Atorvastatin Calcium 40 MG Oral Tab, Take 40 mg by mouth daily. •  AmLODIPine Besylate 10 MG Oral Tab, Take 10 mg by mouth daily.       •  Clopidogrel Bisulfate 75 MG affect        Results:     Laboratory Data:  Recent Labs   Lab 10/23/20  1014 10/23/20  1014 10/27/20  0601 10/28/20  0559 10/29/20  0546   WBC  --    < > 7.2 7.7 6.7   HGB  --    < > 12.1 11.8* 10.8*   HCT  --    < > 39.2 38.7 35.3   PLT  --    < > 426.0 patient.     Lindsay Lin MD      10/28/2020

## 2020-10-29 NOTE — PROGRESS NOTES
[facility]    Progress Note    Nannette Kim Patient Status:  Inpatient    1940 MRN [id number]   Location [facility] 3W/SW Attending Vance Butcher MD   Hosp Day # 2 PCP Jazz Reina MD, Jorge Childers MD     [address] [place] [address] and oriented x3,   Neck:supple,no JVD  Pulm: Lungs clear, normal respiratory effort  CV: Heart with regular rate and rhythm, nl S1,S2 ,no murmur  Abd: Abdomen soft, nontender, obese  Ext:  no clubbing, no cyanosis,no edema, R groin wite without bruit/hemat

## 2020-10-29 NOTE — PLAN OF CARE
Problem: Patient Centered Care  Goal: Patient preferences are identified and integrated in the patient's plan of care  Description: Interventions:  - What would you like us to know as we care for you?  I live in a condo alone, my daughter lives down the b no complaints of pain or shortness of breath. Cardiac cath 10/28 no intervention, R groin site CDI. possible GI source, GI consulted.  Patient NPO since midnight for possible EGD

## 2020-11-02 RX ORDER — CALCIUM CARBONATE 500 MG/1
500 TABLET, CHEWABLE ORAL DAILY PRN
COMMUNITY
Start: 2020-10-29

## 2020-11-02 RX ORDER — SUCRALFATE ORAL 1 G/10ML
1 SUSPENSION ORAL 4 TIMES DAILY
COMMUNITY
Start: 2020-10-29 | End: 2020-11-28

## 2020-11-02 RX ORDER — NITROGLYCERIN 0.6 MG/1
0.6 TABLET SUBLINGUAL PRN
COMMUNITY

## 2020-11-03 ENCOUNTER — OFFICE VISIT (OUTPATIENT)
Dept: CARDIOLOGY | Age: 80
End: 2020-11-03

## 2020-11-03 VITALS
HEIGHT: 65 IN | HEART RATE: 70 BPM | OXYGEN SATURATION: 96 % | WEIGHT: 217 LBS | SYSTOLIC BLOOD PRESSURE: 132 MMHG | BODY MASS INDEX: 36.15 KG/M2 | DIASTOLIC BLOOD PRESSURE: 62 MMHG

## 2020-11-03 DIAGNOSIS — I10 ESSENTIAL HYPERTENSION: ICD-10-CM

## 2020-11-03 DIAGNOSIS — I25.10 CORONARY ARTERY DISEASE INVOLVING NATIVE CORONARY ARTERY OF NATIVE HEART WITHOUT ANGINA PECTORIS: Primary | ICD-10-CM

## 2020-11-03 PROCEDURE — 99213 OFFICE O/P EST LOW 20 MIN: CPT | Performed by: NURSE PRACTITIONER

## 2020-11-03 SDOH — HEALTH STABILITY: MENTAL HEALTH: HOW OFTEN DO YOU HAVE A DRINK CONTAINING ALCOHOL?: NEVER

## 2020-11-03 ASSESSMENT — PATIENT HEALTH QUESTIONNAIRE - PHQ9
SUM OF ALL RESPONSES TO PHQ9 QUESTIONS 1 AND 2: 0
CLINICAL INTERPRETATION OF PHQ9 SCORE: NO FURTHER SCREENING NEEDED
1. LITTLE INTEREST OR PLEASURE IN DOING THINGS: NOT AT ALL
CLINICAL INTERPRETATION OF PHQ2 SCORE: NO FURTHER SCREENING NEEDED
SUM OF ALL RESPONSES TO PHQ9 QUESTIONS 1 AND 2: 0
2. FEELING DOWN, DEPRESSED OR HOPELESS: NOT AT ALL

## 2020-11-03 NOTE — PROGRESS NOTES
11/3/2020  Ashley Harley  [address] [place] [id number]    Dear [place],       Here are the biopsy/pathology findings from your recent EGD (Upper  Endoscopy):    No evidence of hpylori. The polyp was benign.     Mild reflux on bi

## 2020-11-13 ENCOUNTER — APPOINTMENT (OUTPATIENT)
Dept: LAB | Age: 80
End: 2020-11-13
Attending: FAMILY MEDICINE
Payer: MEDICARE

## 2020-11-14 ENCOUNTER — LABORATORY ENCOUNTER (OUTPATIENT)
Dept: LAB | Age: 80
End: 2020-11-14
Attending: FAMILY MEDICINE
Payer: MEDICARE

## 2020-11-14 DIAGNOSIS — N30.20: Primary | ICD-10-CM

## 2020-11-14 DIAGNOSIS — N30.20: ICD-10-CM

## 2020-11-14 PROCEDURE — 87086 URINE CULTURE/COLONY COUNT: CPT

## 2020-11-19 ENCOUNTER — TELEPHONE (OUTPATIENT)
Dept: CARDIOLOGY | Age: 80
End: 2020-11-19

## 2020-12-04 ENCOUNTER — OFFICE VISIT (OUTPATIENT)
Dept: CARDIOLOGY | Age: 80
End: 2020-12-04

## 2020-12-04 ENCOUNTER — LAB ENCOUNTER (OUTPATIENT)
Dept: LAB | Age: 80
End: 2020-12-04
Attending: INTERNAL MEDICINE
Payer: MEDICARE

## 2020-12-04 ENCOUNTER — TELEPHONE (OUTPATIENT)
Dept: CARDIOLOGY | Age: 80
End: 2020-12-04

## 2020-12-04 VITALS
HEIGHT: 65 IN | DIASTOLIC BLOOD PRESSURE: 60 MMHG | WEIGHT: 216 LBS | OXYGEN SATURATION: 98 % | HEART RATE: 60 BPM | SYSTOLIC BLOOD PRESSURE: 130 MMHG | BODY MASS INDEX: 35.99 KG/M2

## 2020-12-04 DIAGNOSIS — I25.10 CORONARY ATHEROSCLEROSIS OF NATIVE CORONARY ARTERY: Primary | ICD-10-CM

## 2020-12-04 DIAGNOSIS — E78.5 DYSLIPIDEMIA: ICD-10-CM

## 2020-12-04 DIAGNOSIS — I10 ESSENTIAL HYPERTENSION: ICD-10-CM

## 2020-12-04 DIAGNOSIS — I25.10 CORONARY ARTERY DISEASE INVOLVING NATIVE CORONARY ARTERY OF NATIVE HEART WITHOUT ANGINA PECTORIS: Primary | ICD-10-CM

## 2020-12-04 LAB
HCT VFR BLD CALC: 40.3 %
HGB BLD-MCNC: 12.4 G/DL
PLATELET # BLD: 448 K/MCL
RBC # BLD: 4.81 10*6/UL
WBC # BLD: 8.9 K/MCL

## 2020-12-04 PROCEDURE — 99214 OFFICE O/P EST MOD 30 MIN: CPT | Performed by: INTERNAL MEDICINE

## 2020-12-04 PROCEDURE — 36415 COLL VENOUS BLD VENIPUNCTURE: CPT

## 2020-12-04 PROCEDURE — 85025 COMPLETE CBC W/AUTO DIFF WBC: CPT

## 2020-12-04 RX ORDER — METOPROLOL SUCCINATE 50 MG/1
50 TABLET, EXTENDED RELEASE ORAL DAILY
Qty: 90 TABLET | Refills: 0 | Status: SHIPPED | OUTPATIENT
Start: 2020-12-04 | End: 2020-12-09 | Stop reason: SDUPTHER

## 2020-12-04 ASSESSMENT — PATIENT HEALTH QUESTIONNAIRE - PHQ9
CLINICAL INTERPRETATION OF PHQ9 SCORE: NO FURTHER SCREENING NEEDED
2. FEELING DOWN, DEPRESSED OR HOPELESS: NOT AT ALL
1. LITTLE INTEREST OR PLEASURE IN DOING THINGS: NOT AT ALL
CLINICAL INTERPRETATION OF PHQ2 SCORE: NO FURTHER SCREENING NEEDED
SUM OF ALL RESPONSES TO PHQ9 QUESTIONS 1 AND 2: 0
SUM OF ALL RESPONSES TO PHQ9 QUESTIONS 1 AND 2: 0

## 2020-12-07 ENCOUNTER — CLINICAL ABSTRACT (OUTPATIENT)
Dept: CARDIOLOGY | Age: 80
End: 2020-12-07

## 2020-12-09 RX ORDER — METOPROLOL SUCCINATE 100 MG/1
100 TABLET, EXTENDED RELEASE ORAL DAILY
Qty: 90 TABLET | Refills: 2 | Status: SHIPPED | OUTPATIENT
Start: 2020-12-09

## 2020-12-29 RX ORDER — AMLODIPINE BESYLATE 10 MG/1
10 TABLET ORAL DAILY
Qty: 90 TABLET | Refills: 3 | Status: SHIPPED | OUTPATIENT
Start: 2020-12-29

## 2020-12-29 RX ORDER — EZETIMIBE 10 MG/1
10 TABLET ORAL AT BEDTIME
Qty: 90 TABLET | Refills: 3 | Status: SHIPPED | OUTPATIENT
Start: 2020-12-29

## 2021-01-05 ENCOUNTER — OFFICE VISIT (OUTPATIENT)
Dept: PODIATRY CLINIC | Facility: CLINIC | Age: 81
End: 2021-01-05
Payer: MEDICARE

## 2021-01-05 DIAGNOSIS — L97.528 ULCER OF LEFT FOOT WITH OTHER SEVERITY (HCC): ICD-10-CM

## 2021-01-05 DIAGNOSIS — L84 CALLUS OF FOOT: Primary | ICD-10-CM

## 2021-01-05 PROCEDURE — 99203 OFFICE O/P NEW LOW 30 MIN: CPT | Performed by: PODIATRIST

## 2021-01-05 RX ORDER — AMOXICILLIN AND CLAVULANATE POTASSIUM 875; 125 MG/1; MG/1
1 TABLET, FILM COATED ORAL 2 TIMES DAILY
Qty: 14 TABLET | Refills: 0 | Status: SHIPPED | OUTPATIENT
Start: 2021-01-05 | End: 2021-01-18

## 2021-01-05 RX ORDER — EZETIMIBE AND SIMVASTATIN 10; 10 MG/1; MG/1
TABLET ORAL
COMMUNITY
End: 2021-05-27 | Stop reason: ALTCHOICE

## 2021-01-05 RX ORDER — SUCRALFATE 1 G/1
TABLET ORAL
COMMUNITY
Start: 2020-11-06 | End: 2021-01-18

## 2021-01-05 RX ORDER — EZETIMIBE 10 MG/1
TABLET ORAL
COMMUNITY
Start: 2020-12-29 | End: 2021-01-18

## 2021-01-05 RX ORDER — PANTOPRAZOLE SODIUM 40 MG/1
40 TABLET, DELAYED RELEASE ORAL
COMMUNITY
End: 2021-01-18

## 2021-01-05 NOTE — PROGRESS NOTES
Ofelia Davis is a 78year old female. Patient presents with:  Consult: callus left foot -- Daughter, Hipolito Davis, with pt. States site may be infected. Site drained pus and blood this past weekend. Site had odor. Rates pain 5/10. Not diabetic. • GERD (gastroesophageal reflux disease)    • High blood pressure    • High cholesterol    • History of stomach ulcers    • Osteoarthritis    • PONV (postoperative nausea and vomiting)    • Squamous carcinoma 2001    right cheek   • Squamous cell carcino distress  EXTREMITIES:   1. Integument: The skin on the left foot was examined its warm and dry there is a hyperkeratotic lesion on the bottom of the first metatarsal head which when evaluated has some dried hemorrhage in it and some fissuring.   On trimmin redness swelling warmth fever chills they should present to the emergency room for evaluation have me paged. The patient indicates understanding of these issues and agrees to the plan.     Lucien Conception, SANJUANA

## 2021-01-11 ENCOUNTER — OFFICE VISIT (OUTPATIENT)
Dept: PODIATRY CLINIC | Facility: CLINIC | Age: 81
End: 2021-01-11
Payer: MEDICARE

## 2021-01-11 DIAGNOSIS — L97.528 ULCER OF LEFT FOOT WITH OTHER SEVERITY (HCC): ICD-10-CM

## 2021-01-11 DIAGNOSIS — L84 CALLUS OF FOOT: Primary | ICD-10-CM

## 2021-01-11 PROCEDURE — 99213 OFFICE O/P EST LOW 20 MIN: CPT | Performed by: PODIATRIST

## 2021-01-11 NOTE — PROGRESS NOTES
Lexi Lincoln is a 78year old female. Patient presents with: Follow - Up: ulcer left foot -- Daughter with pt. States foot is feeling \"good\". States foot has occasional pain at end of day. Rates pain 0/10 right now. Denies any drainage now. lateral left cheek      Past Surgical History:   Procedure Laterality Date   • ANGIOPLASTY (CORONARY)  2013    multiple angioplasties   • ANGIOPLASTY (CORONARY)  2013 and 2017   • CATH BARE METAL STENT (BMS)     • CATH DRUG ELUTING STENT     • ESOPHAGOGAST tib   3. Neurologic: The patient has intact sensorium   4. Musculoskeletal: The patient has good muscle strength. Today I reviewed the culture and sensitivity report showing only normal skin gian.     ASSESSMENT AND PLAN:   Diagnoses and all orders for th

## 2021-01-18 ENCOUNTER — OFFICE VISIT (OUTPATIENT)
Dept: PODIATRY CLINIC | Facility: CLINIC | Age: 81
End: 2021-01-18
Payer: MEDICARE

## 2021-01-18 DIAGNOSIS — L97.528 ULCER OF LEFT FOOT WITH OTHER SEVERITY (HCC): ICD-10-CM

## 2021-01-18 DIAGNOSIS — L84 CALLUS OF FOOT: Primary | ICD-10-CM

## 2021-01-18 PROCEDURE — 99213 OFFICE O/P EST LOW 20 MIN: CPT | Performed by: PODIATRIST

## 2021-01-21 NOTE — PROGRESS NOTES
Kimberly Snell is a 78year old female.  Patient presents with:  Wound Recheck: L foot ulcer f/u - states she is good, no drainage, no pain         HPI:   Patient returns the clinic for checkup on the callus and ulceration on the bottom of her left fo STENT (BMS)     • CATH DRUG ELUTING STENT     • ESOPHAGOGASTRODUODENOSCOPY (EGD) N/A 10/29/2020    Performed by Lukas Goodwin MD at [address] [place] [address] ENDOSCOPY   • TOTAL KNEE REPLACEMENT        Family History   Problem Relation Age of Onset   • Stroke Father all orders for this visit:    Callus of foot    Ulcer of left foot with other severity ([address]        Plan:  At this office visit trimmed down and debrided the hyperkeratosis there was not much there recommended she get into a good supportive athletic shoe so

## 2021-02-06 DIAGNOSIS — Z23 NEED FOR VACCINATION: ICD-10-CM

## 2021-02-08 ENCOUNTER — HOSPITAL ENCOUNTER (OUTPATIENT)
Age: 81
Discharge: HOME | End: 2021-02-08
Attending: EMERGENCY MEDICINE
Payer: MEDICARE

## 2021-02-08 VITALS
OXYGEN SATURATION: 96 % | TEMPERATURE: 97 F | DIASTOLIC BLOOD PRESSURE: 56 MMHG | HEART RATE: 65 BPM | RESPIRATION RATE: 18 BRPM | SYSTOLIC BLOOD PRESSURE: 122 MMHG

## 2021-02-08 DIAGNOSIS — D64.9 ANEMIA, UNSPECIFIED TYPE: Primary | ICD-10-CM

## 2021-02-08 DIAGNOSIS — E86.0 DEHYDRATION: ICD-10-CM

## 2021-02-08 LAB
BASOPHILS # BLD AUTO: 0.05 X10(3) UL (ref 0–0.2)
BASOPHILS NFR BLD AUTO: 0.5 %
BILIRUB UR QL STRIP: NEGATIVE
CLARITY UR: CLEAR
COLOR UR: YELLOW
CREAT BLD-MCNC: 1.1 MG/DL
DEPRECATED RDW RBC AUTO: 47.5 FL (ref 35.1–46.3)
EOSINOPHIL # BLD AUTO: 0.19 X10(3) UL (ref 0–0.7)
EOSINOPHIL NFR BLD AUTO: 1.7 %
ERYTHROCYTE [DISTWIDTH] IN BLOOD BY AUTOMATED COUNT: 15.9 % (ref 11–15)
GLUCOSE BLD-MCNC: 122 MG/DL (ref 70–99)
GLUCOSE UR STRIP-MCNC: NEGATIVE MG/DL
HCT VFR BLD AUTO: 42.6 %
HCT VFR BLD AUTO: 43.5 %
HGB BLD-MCNC: 13.2 G/DL
HGB BLD-MCNC: 8.1 G/DL
HGB UR QL STRIP: NEGATIVE
IMM GRANULOCYTES # BLD AUTO: 0.03 X10(3) UL (ref 0–1)
IMM GRANULOCYTES NFR BLD: 0.3 %
ISTAT BUN: 16 MG/DL (ref 7–18)
ISTAT CHLORIDE: 100 MMOL/L (ref 98–112)
ISTAT HEMATOCRIT: 46 %
ISTAT IONIZED CALCIUM FOR CHEM 8: 1.11 MMOL/L (ref 1.12–1.32)
ISTAT POTASSIUM: 4 MMOL/L (ref 3.6–5.1)
ISTAT SODIUM: 134 MMOL/L (ref 136–145)
ISTAT TCO2: 26 MMOL/L (ref 21–32)
KETONES UR STRIP-MCNC: NEGATIVE MG/DL
LEUKOCYTE ESTERASE UR QL STRIP: NEGATIVE
LYMPHOCYTES # BLD AUTO: 1.72 X10(3) UL (ref 1–4)
LYMPHOCYTES NFR BLD AUTO: 15.6 %
MCH RBC QN AUTO: 25.5 PG (ref 26–34)
MCH RBC QN AUTO: <20 PG (ref 26–34)
MCHC RBC AUTO-ENTMCNC: 31 G/DL (ref 31–37)
MCHC RBC AUTO-ENTMCNC: <25 G/DL (ref 31–37)
MCV RBC AUTO: 82.4 FL
MCV RBC AUTO: 83 FL (ref 80–100)
MONOCYTES # BLD AUTO: 0.74 X10(3) UL (ref 0.1–1)
MONOCYTES NFR BLD AUTO: 6.7 %
NEUTROPHILS # BLD AUTO: 8.32 X10 (3) UL (ref 1.5–7.7)
NEUTROPHILS # BLD AUTO: 8.32 X10(3) UL (ref 1.5–7.7)
NEUTROPHILS NFR BLD AUTO: 75.2 %
NITRITE UR QL STRIP: NEGATIVE
PH UR STRIP: 6.5 [PH]
PLATELET # BLD AUTO: 502 10(3)UL (ref 150–450)
PROT UR STRIP-MCNC: NEGATIVE MG/DL
RBC # BLD AUTO: 5.17 X10(6)UL
RBC # BLD AUTO: 5.24 X10ˆ6/UL
SP GR UR STRIP: 1.01
UROBILINOGEN UR STRIP-ACNC: <2 MG/DL
WBC # BLD AUTO: 11.1 X10(3) UL (ref 4–11)

## 2021-02-08 PROCEDURE — 99213 OFFICE O/P EST LOW 20 MIN: CPT

## 2021-02-08 PROCEDURE — 81002 URINALYSIS NONAUTO W/O SCOPE: CPT

## 2021-02-08 PROCEDURE — 99214 OFFICE O/P EST MOD 30 MIN: CPT

## 2021-02-08 PROCEDURE — 85025 COMPLETE CBC W/AUTO DIFF WBC: CPT | Performed by: EMERGENCY MEDICINE

## 2021-02-08 PROCEDURE — 80047 BASIC METABLC PNL IONIZED CA: CPT

## 2021-02-08 RX ORDER — SODIUM CHLORIDE 9 MG/ML
1000 INJECTION, SOLUTION INTRAVENOUS ONCE
Status: COMPLETED | OUTPATIENT
Start: 2021-02-08 | End: 2021-02-08

## 2021-02-08 NOTE — ED INITIAL ASSESSMENT (HPI)
Urinary frequency, decreased appetite, not feeling well for 1 week, denies sick contact with covid, no fever

## 2021-02-08 NOTE — ED PROVIDER NOTES
Patient Seen in: [facility]      History   Patient presents with:  Urinary Symptoms    Stated Complaint: uti    HPI/Subjective:   HPI    77 yo female with poor appetite for one week. She has a h/o GERD and has been belching.  No vomiting or di Physical Exam  Vitals signs and nursing note reviewed. Constitutional:       Appearance: Normal appearance. She is well-developed. HENT:      Head: Normocephalic and atraumatic. Mouth/Throat:      Mouth: Mucous membranes are dry.    Eyes: Clinical Impression:  Anemia, unspecified type  (primary encounter diagnosis)  Dehydration    Disposition:  Discharge  2/8/2021  9:36 am    Follow-up:  Amy Cushing, MD  [address] [place] [address]    Call today            Medic

## 2021-03-01 ENCOUNTER — OFFICE VISIT (OUTPATIENT)
Dept: PODIATRY CLINIC | Facility: CLINIC | Age: 81
End: 2021-03-01
Payer: MEDICARE

## 2021-03-01 DIAGNOSIS — M21.6X2 PLANTAR FAT PAD ATROPHY OF LEFT FOOT: ICD-10-CM

## 2021-03-01 DIAGNOSIS — L84 CALLUS OF FOOT: Primary | ICD-10-CM

## 2021-03-01 DIAGNOSIS — L97.528 ULCER OF LEFT FOOT WITH OTHER SEVERITY (HCC): ICD-10-CM

## 2021-03-01 PROCEDURE — 99213 OFFICE O/P EST LOW 20 MIN: CPT | Performed by: PODIATRIST

## 2021-03-01 RX ORDER — LIDOCAINE HYDROCHLORIDE 20 MG/ML
SOLUTION ORAL; TOPICAL
COMMUNITY
Start: 2021-02-08 | End: 2021-03-01 | Stop reason: ALTCHOICE

## 2021-03-01 RX ORDER — PANTOPRAZOLE SODIUM 40 MG/1
TABLET, DELAYED RELEASE ORAL
COMMUNITY
Start: 2021-02-01 | End: 2021-05-21

## 2021-03-02 NOTE — PROGRESS NOTES
Ashley Harley is a 78year old female. Patient presents with:  Diabetic Ulcer: left foot - pt denies any fever, chills or d/c.          HPI:   Patient returns the clinic for a checkup on her heel ulceration on the bottom of her left foot great toe brayden (BMS)     • CATH DRUG ELUTING STENT     • ESOPHAGOGASTRODUODENOSCOPY (EGD) N/A 10/29/2020    Performed by Merced Harley MD at [facility] ENDOSCOPY   • TOTAL KNEE REPLACEMENT        Family History   Problem Relation Age of Onset   • Stroke Father         CVA fat pad atrophy underneath the first metatarsal head of her left foot.     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

## 2021-03-04 ENCOUNTER — IMMUNIZATION (OUTPATIENT)
Dept: LAB | Facility: HOSPITAL | Age: 81
End: 2021-03-04
Attending: HOSPITALIST
Payer: MEDICARE

## 2021-03-04 DIAGNOSIS — Z23 NEED FOR VACCINATION: Primary | ICD-10-CM

## 2021-03-04 PROCEDURE — 0011A SARSCOV2 VAC 100MCG/0.5ML IM: CPT

## 2021-04-01 ENCOUNTER — IMMUNIZATION (OUTPATIENT)
Dept: LAB | Facility: HOSPITAL | Age: 81
End: 2021-04-01
Attending: EMERGENCY MEDICINE
Payer: MEDICARE

## 2021-04-01 DIAGNOSIS — Z23 NEED FOR VACCINATION: Primary | ICD-10-CM

## 2021-04-01 PROCEDURE — 0012A SARSCOV2 VAC 100MCG/0.5ML IM: CPT

## 2021-04-01 RX ORDER — CLOPIDOGREL BISULFATE 75 MG/1
75 TABLET ORAL DAILY
Qty: 90 TABLET | Refills: 2 | Status: SHIPPED | OUTPATIENT
Start: 2021-04-01

## 2021-04-01 RX ORDER — ATORVASTATIN CALCIUM 40 MG/1
40 TABLET, FILM COATED ORAL DAILY
Qty: 90 TABLET | Refills: 2 | Status: SHIPPED | OUTPATIENT
Start: 2021-04-01

## 2021-04-30 ENCOUNTER — HOSPITAL ENCOUNTER (EMERGENCY)
Facility: HOSPITAL | Age: 81
Discharge: HOME | End: 2021-05-01
Attending: EMERGENCY MEDICINE
Payer: MEDICARE

## 2021-04-30 DIAGNOSIS — N30.00 ACUTE CYSTITIS WITHOUT HEMATURIA: Primary | ICD-10-CM

## 2021-04-30 PROCEDURE — 36415 COLL VENOUS BLD VENIPUNCTURE: CPT

## 2021-04-30 PROCEDURE — 87086 URINE CULTURE/COLONY COUNT: CPT | Performed by: EMERGENCY MEDICINE

## 2021-04-30 PROCEDURE — 80048 BASIC METABOLIC PNL TOTAL CA: CPT | Performed by: EMERGENCY MEDICINE

## 2021-04-30 PROCEDURE — 99283 EMERGENCY DEPT VISIT LOW MDM: CPT

## 2021-04-30 PROCEDURE — 85025 COMPLETE CBC W/AUTO DIFF WBC: CPT | Performed by: EMERGENCY MEDICINE

## 2021-04-30 PROCEDURE — 81001 URINALYSIS AUTO W/SCOPE: CPT | Performed by: EMERGENCY MEDICINE

## 2021-05-01 VITALS
RESPIRATION RATE: 16 BRPM | OXYGEN SATURATION: 97 % | DIASTOLIC BLOOD PRESSURE: 63 MMHG | HEART RATE: 61 BPM | BODY MASS INDEX: 33.32 KG/M2 | HEIGHT: 65 IN | SYSTOLIC BLOOD PRESSURE: 130 MMHG | WEIGHT: 200 LBS | TEMPERATURE: 98 F

## 2021-05-01 RX ORDER — CEPHALEXIN 500 MG/1
500 CAPSULE ORAL 2 TIMES DAILY
Qty: 14 CAPSULE | Refills: 0 | Status: SHIPPED | OUTPATIENT
Start: 2021-05-01 | End: 2021-05-08

## 2021-05-01 NOTE — ED PROVIDER NOTES
Patient Seen in: [facility] AND [facility]      History   Patient presents with:  Urinary Symptoms    Stated Complaint: UTI    HPI/Subjective:   HPI  Patient is a 49-year-old female history of CAD, hypertension, hyperlipidemia presenting fro Exam  Vitals and nursing note reviewed. Constitutional:       General: She is not in acute distress. Appearance: Normal appearance. She is not toxic-appearing. HENT:      Head: Normocephalic.       Mouth/Throat:      Mouth: Mucous membranes are mois CBC WITH DIFFERENTIAL WITH PLATELET    Narrative: The following orders were created for panel order CBC WITH DIFFERENTIAL WITH PLATELET.   Procedure                               Abnormality         Status                     ---------

## 2021-05-01 NOTE — ED INITIAL ASSESSMENT (HPI)
approx 1wk of urinary frequency/urge and dehydration, hx UTIs in the past, consistent w/ present s/s.

## 2021-05-05 ENCOUNTER — OFFICE VISIT (OUTPATIENT)
Dept: FAMILY MEDICINE CLINIC | Facility: CLINIC | Age: 81
End: 2021-05-05
Payer: MEDICARE

## 2021-05-05 VITALS
HEIGHT: 65 IN | DIASTOLIC BLOOD PRESSURE: 62 MMHG | WEIGHT: 211 LBS | HEART RATE: 68 BPM | SYSTOLIC BLOOD PRESSURE: 124 MMHG | BODY MASS INDEX: 35.16 KG/M2

## 2021-05-05 DIAGNOSIS — R53.83 FATIGUE, UNSPECIFIED TYPE: ICD-10-CM

## 2021-05-05 DIAGNOSIS — R10.2 SUPRAPUBIC DISCOMFORT: Primary | ICD-10-CM

## 2021-05-05 DIAGNOSIS — R05.3 CHRONIC COUGH: ICD-10-CM

## 2021-05-05 PROCEDURE — 99202 OFFICE O/P NEW SF 15 MIN: CPT | Performed by: STUDENT IN AN ORGANIZED HEALTH CARE EDUCATION/TRAINING PROGRAM

## 2021-05-05 NOTE — PROGRESS NOTES
HPI:    Patient ID: Lexi Lincoln is a 78year old female. HPI  Pt presenting with fatigue and suprapubic discomfort. She was recently seen in ER on 4/30 due to urinary frequency, was discharged with Keflex for UTI.  She has been taking medication mouth daily. Allergies:  Bactrim Ds              UNKNOWN  Codeine                 UNKNOWN    05/05/21  0824   BP: 124/62   Pulse: 68   Weight: 211 lb (95.7 kg)   Height: 5' 5\" (1.651 m)       Body mass index is 35.11 kg/m².    PHYSICAL EXAM:   Physic persistent symptoms  - URINE CULTURE, ROUTINE; Future  - URINALYSIS, ROUTINE; Future    2. Chronic cough  Discussed may be related to GERD or seasonal allergies, will check CXR for further evaluation  - XR CHEST PA + LAT CHEST (CPT=71046); Future    3.  Fat

## 2021-05-07 ENCOUNTER — OFFICE VISIT (OUTPATIENT)
Dept: PODIATRY CLINIC | Facility: CLINIC | Age: 81
End: 2021-05-07
Payer: MEDICARE

## 2021-05-07 DIAGNOSIS — M21.6X2 PLANTAR FAT PAD ATROPHY OF LEFT FOOT: ICD-10-CM

## 2021-05-07 DIAGNOSIS — B35.1 ONYCHOMYCOSIS: ICD-10-CM

## 2021-05-07 DIAGNOSIS — L84 CALLUS OF FOOT: Primary | ICD-10-CM

## 2021-05-07 DIAGNOSIS — M77.42 METATARSALGIA OF LEFT FOOT: ICD-10-CM

## 2021-05-07 PROCEDURE — 11721 DEBRIDE NAIL 6 OR MORE: CPT | Performed by: PODIATRIST

## 2021-05-08 ENCOUNTER — LAB ENCOUNTER (OUTPATIENT)
Dept: LAB | Age: 81
End: 2021-05-08
Attending: STUDENT IN AN ORGANIZED HEALTH CARE EDUCATION/TRAINING PROGRAM
Payer: MEDICARE

## 2021-05-08 ENCOUNTER — HOSPITAL ENCOUNTER (OUTPATIENT)
Dept: GENERAL RADIOLOGY | Age: 81
Discharge: HOME | End: 2021-05-08
Attending: STUDENT IN AN ORGANIZED HEALTH CARE EDUCATION/TRAINING PROGRAM
Payer: MEDICARE

## 2021-05-08 DIAGNOSIS — R10.2 SUPRAPUBIC DISCOMFORT: ICD-10-CM

## 2021-05-08 DIAGNOSIS — R05.3 CHRONIC COUGH: ICD-10-CM

## 2021-05-08 PROCEDURE — 87086 URINE CULTURE/COLONY COUNT: CPT

## 2021-05-08 PROCEDURE — 87186 SC STD MICRODIL/AGAR DIL: CPT

## 2021-05-08 PROCEDURE — 71046 X-RAY EXAM CHEST 2 VIEWS: CPT | Performed by: STUDENT IN AN ORGANIZED HEALTH CARE EDUCATION/TRAINING PROGRAM

## 2021-05-08 PROCEDURE — 87077 CULTURE AEROBIC IDENTIFY: CPT

## 2021-05-08 PROCEDURE — 81003 URINALYSIS AUTO W/O SCOPE: CPT

## 2021-05-12 NOTE — PROGRESS NOTES
Ana Ruiz is a 78year old female. Patient presents with:  Toenail Care: 78year old female unable to trim her own nails.  Callus on the bottom of the left foot is starting to bother her, 3/10 for pain        HPI:   Pleasant patient returns to th History of stomach ulcers    • Osteoarthritis    • PONV (postoperative nausea and vomiting)    • Squamous carcinoma 2001    right cheek   • Squamous cell carcinoma 1997    lateral left cheek      Past Surgical History:   Procedure Laterality Date   • ANGIO which is painful to palpation. After trimming and debriding it with a 15 blade there is no evidence of an ulceration. 2. Vascular: The patient has palpable pulses both dorsalis pedis and posterior tibial bilateral   3.  Neurologic: The patient has intact

## 2021-05-27 ENCOUNTER — OFFICE VISIT (OUTPATIENT)
Dept: ALLERGY | Facility: CLINIC | Age: 81
End: 2021-05-27
Payer: MEDICARE

## 2021-05-27 ENCOUNTER — OFFICE VISIT (OUTPATIENT)
Dept: DERMATOLOGY CLINIC | Facility: CLINIC | Age: 81
End: 2021-05-27
Payer: MEDICARE

## 2021-05-27 ENCOUNTER — NURSE ONLY (OUTPATIENT)
Dept: ALLERGY | Facility: CLINIC | Age: 81
End: 2021-05-27
Payer: MEDICARE

## 2021-05-27 VITALS
WEIGHT: 212 LBS | HEART RATE: 67 BPM | OXYGEN SATURATION: 97 % | SYSTOLIC BLOOD PRESSURE: 126 MMHG | DIASTOLIC BLOOD PRESSURE: 71 MMHG | BODY MASS INDEX: 35 KG/M2

## 2021-05-27 VITALS — HEIGHT: 65 IN | WEIGHT: 200 LBS | BODY MASS INDEX: 33.32 KG/M2

## 2021-05-27 DIAGNOSIS — R05.3 CHRONIC COUGH: Primary | ICD-10-CM

## 2021-05-27 DIAGNOSIS — Z91.09 ENVIRONMENTAL ALLERGIES: ICD-10-CM

## 2021-05-27 DIAGNOSIS — K21.9 GASTROESOPHAGEAL REFLUX DISEASE, UNSPECIFIED WHETHER ESOPHAGITIS PRESENT: ICD-10-CM

## 2021-05-27 DIAGNOSIS — R05.3 CHRONIC COUGH: ICD-10-CM

## 2021-05-27 DIAGNOSIS — J30.89 PERENNIAL ALLERGIC RHINITIS: ICD-10-CM

## 2021-05-27 DIAGNOSIS — K44.9 HIATAL HERNIA: ICD-10-CM

## 2021-05-27 DIAGNOSIS — Z85.828 PERSONAL HISTORY OF SKIN CANCER: ICD-10-CM

## 2021-05-27 DIAGNOSIS — L57.0 ACTINIC KERATOSIS: Primary | ICD-10-CM

## 2021-05-27 DIAGNOSIS — D22.9 MULTIPLE MELANOCYTIC NEVI: ICD-10-CM

## 2021-05-27 DIAGNOSIS — L30.9 DERMATITIS: ICD-10-CM

## 2021-05-27 DIAGNOSIS — L82.0 INFLAMED SEBORRHEIC KERATOSIS: ICD-10-CM

## 2021-05-27 DIAGNOSIS — L57.8 SUN-DAMAGED SKIN: ICD-10-CM

## 2021-05-27 DIAGNOSIS — L81.4 SOLAR LENTIGO: ICD-10-CM

## 2021-05-27 DIAGNOSIS — L82.1 SEBORRHEIC KERATOSES: ICD-10-CM

## 2021-05-27 PROCEDURE — 17110 DESTRUCTION B9 LES UP TO 14: CPT | Performed by: DERMATOLOGY

## 2021-05-27 PROCEDURE — 17000 DESTRUCT PREMALG LESION: CPT | Performed by: DERMATOLOGY

## 2021-05-27 PROCEDURE — 99204 OFFICE O/P NEW MOD 45 MIN: CPT | Performed by: ALLERGY & IMMUNOLOGY

## 2021-05-27 PROCEDURE — 99213 OFFICE O/P EST LOW 20 MIN: CPT | Performed by: DERMATOLOGY

## 2021-05-27 PROCEDURE — 17003 DESTRUCT PREMALG LES 2-14: CPT | Performed by: DERMATOLOGY

## 2021-05-27 PROCEDURE — 95004 PERQ TESTS W/ALRGNC XTRCS: CPT | Performed by: ALLERGY & IMMUNOLOGY

## 2021-05-27 RX ORDER — FLUTICASONE PROPIONATE 50 MCG
2 SPRAY, SUSPENSION (ML) NASAL DAILY
Qty: 1 EACH | Refills: 0 | Status: SHIPPED | OUTPATIENT
Start: 2021-05-27 | End: 2021-10-16

## 2021-05-27 RX ORDER — AZELASTINE 1 MG/ML
2 SPRAY, METERED NASAL DAILY
Qty: 1 EACH | Refills: 0 | Status: SHIPPED | OUTPATIENT
Start: 2021-05-27 | End: 2021-10-16

## 2021-05-27 RX ORDER — EZETIMIBE 10 MG/1
10 TABLET ORAL NIGHTLY
COMMUNITY

## 2021-05-27 NOTE — PROGRESS NOTES
Lindsey Howard is a 78year old female. HPI:   Patient presents with:  Cough: has had for many years but getting worse.  tickling in throat   Shortness Of Breath    Patient is an 78-year-old female who presents for allergy evaluation upon referral ELUTING STENT     • TOTAL KNEE REPLACEMENT        Family History   Problem Relation Age of Onset   • Stroke Father         CVA   • Lung Disorder Father         lung disease   • Heart Disease Father    • Breast Cancer Mother    • Hypertension Mother    • Ar discharge and vision loss  Gastrointestinal:  Negative for abdominal pain, diarrhea and vomiting  Genitourinary:  Negative for dysuria and hematuria  Hema/Lymph:  Negative for easy bleeding and easy bruising  Integumentary:  Negative for pruritus and rash tried. Patient does have an underlying history of GERD and hiatal hernia currently on omeprazole and Carafate. No nocturnal cough when she is asleep    Skin testing today to common indoor and outdoor environmental allergens was negative on spt.  ID testin medications. Teaching, instruction and sample was provided to the patient by myself. Teaching included  a review of potential adverse side effects as well as potential efficacy.   Patient's questions were answered in regards to medication administration a

## 2021-05-27 NOTE — PROGRESS NOTES
HPI:     Chief Complaint     Full Skin Exam        HPI     Full Skin Exam      Additional comments: LOV on 5/27/20 personal HX of AK's and Scc ,no areas of concern today           Last edited by Paula Kim [address] [place] [address] Anna on 5/27/2021  7:57 AM. (History) by mouth daily.        Allergies:     Bactrim Ds              UNKNOWN  Codeine                 UNKNOWN    Past Medical History:   Diagnosis Date   • Actinic keratoses 2013    left jaw   • Anesthesia complication    • Cataract    • Coronary atherosclerosis has a pacemaker: No        Pt has a defibrillator: No        Breast feeding: Not Asked        Reaction to local anesthetic: No    Social History Narrative      Not on file    Social Determinants of Health  Financial Resource Strain:       Difficulty of Pay and extremities  - there is 1 area of erythema and keratotic scale on the forehead, and 1 on the nasal dorsum.   Tiffani Pouch is an inflamed pink crusted slightly verrucous papule on the upper right arm measuring 8 mm  - there are some cherry red papules  - there Referral Orders:  No orders of the defined types were placed in this encounter.         5/27/2021  Merry Mauro

## 2021-05-27 NOTE — PATIENT INSTRUCTIONS
1.  Chronic cough  Reviewed common etiologies for chronic cough including postnasal drip syndrome, GERD, cough variant asthma, postinfectious cough and habit cough  Trial of Flonase/fluticasone 2 sprays per nostril once a day as a intranasal steroid spray.

## 2021-06-14 ENCOUNTER — OFFICE VISIT (OUTPATIENT)
Dept: OBGYN CLINIC | Facility: CLINIC | Age: 81
End: 2021-06-14
Payer: MEDICARE

## 2021-06-14 VITALS
BODY MASS INDEX: 35 KG/M2 | DIASTOLIC BLOOD PRESSURE: 75 MMHG | HEART RATE: 64 BPM | WEIGHT: 212 LBS | SYSTOLIC BLOOD PRESSURE: 147 MMHG

## 2021-06-14 DIAGNOSIS — Z12.31 ENCOUNTER FOR SCREENING MAMMOGRAM FOR MALIGNANT NEOPLASM OF BREAST: ICD-10-CM

## 2021-06-14 DIAGNOSIS — Z01.419 WELL WOMAN EXAM WITH ROUTINE GYNECOLOGICAL EXAM: Primary | ICD-10-CM

## 2021-06-14 PROCEDURE — G0101 CA SCREEN;PELVIC/BREAST EXAM: HCPCS | Performed by: CLINICAL NURSE SPECIALIST

## 2021-06-16 NOTE — PROGRESS NOTES
Harinder Avelar is a 78year old female  No LMP recorded. Patient is postmenopausal. Patient presents with:  Gyn Exam: ANNUAL EXAM  New pt. Here with her daughter. Unsure when last annual exam/pap was but states its been many years.  Denies past hx ANGIOPLASTY (CORONARY)  2013 and 2017   • CATH BARE METAL STENT (BMS)     • CATH DRUG ELUTING STENT     • TOTAL KNEE REPLACEMENT         SOCIAL HISTORY:  Social History    Socioeconomic History      Marital status:        Spouse name: Not on file of Communication with Friends and Family:       Frequency of Social Gatherings with Friends and Family:       Attends Jain Services:       Active Member of Clubs or Organizations:       Attends Club or Organization Meetings:       Marital Status:    In Rfl:     ALLERGIES:    Bactrim Ds              UNKNOWN  Codeine                 UNKNOWN      Review of Systems:  Constitutional:  Denies fatigue, night sweats, hot flashes  Eyes:  denies blurred or double vision  Cardiovascular:  denies chest pain or palpi no hemorroids     Assessment & Plan:  Tita Elder was seen today for gyn exam.    Diagnoses and all orders for this visit:    Well woman exam with routine gynecological exam    Encounter for screening mammogram for malignant neoplasm of breast  -     [facility]

## 2021-06-18 ENCOUNTER — OFFICE VISIT (OUTPATIENT)
Dept: PODIATRY CLINIC | Facility: CLINIC | Age: 81
End: 2021-06-18
Payer: MEDICARE

## 2021-06-18 DIAGNOSIS — M77.42 METATARSALGIA OF LEFT FOOT: Primary | ICD-10-CM

## 2021-06-18 DIAGNOSIS — L84 CALLUS OF FOOT: ICD-10-CM

## 2021-06-18 DIAGNOSIS — B35.1 ONYCHOMYCOSIS: ICD-10-CM

## 2021-06-18 DIAGNOSIS — M21.6X2 PLANTAR FAT PAD ATROPHY OF LEFT FOOT: ICD-10-CM

## 2021-06-18 PROCEDURE — 99213 OFFICE O/P EST LOW 20 MIN: CPT | Performed by: PODIATRIST

## 2021-06-23 NOTE — PROGRESS NOTES
Charisse Bush is a 78year old female. Patient presents with:  Toenail Care:  Toenail care, callous on bottom of left foot, states it is feeling better, denies any pain        HPI:   Pleasant patient returns to the clinic she is again complaining of Essential hypertension    • GERD (gastroesophageal reflux disease)    • High blood pressure    • High cholesterol    • History of stomach ulcers    • Osteoarthritis    • PONV (postoperative nausea and vomiting)    • Squamous carcinoma 2001    right cheek nourished, in no apparent distress  EXTREMITIES:   1. Integument: The skin on both feet was evaluated its warm and dry she has a nucleated keratoma underneath the first metatarsal head on the left foot which is painful to palpation.   After trimming and mitzi

## 2021-07-02 ENCOUNTER — OFFICE VISIT (OUTPATIENT)
Dept: FAMILY MEDICINE CLINIC | Facility: CLINIC | Age: 81
End: 2021-07-02
Payer: MEDICARE

## 2021-07-02 VITALS
HEART RATE: 66 BPM | BODY MASS INDEX: 35.49 KG/M2 | SYSTOLIC BLOOD PRESSURE: 138 MMHG | RESPIRATION RATE: 18 BRPM | WEIGHT: 213 LBS | DIASTOLIC BLOOD PRESSURE: 72 MMHG | HEIGHT: 65 IN

## 2021-07-02 DIAGNOSIS — R35.0 URINARY FREQUENCY: Primary | ICD-10-CM

## 2021-07-02 DIAGNOSIS — R31.9 HEMATURIA, UNSPECIFIED TYPE: ICD-10-CM

## 2021-07-02 LAB
APPEARANCE: CLEAR
BILIRUBIN: NEGATIVE
GLUCOSE (URINE DIPSTICK): NEGATIVE MG/DL
KETONES (URINE DIPSTICK): NEGATIVE MG/DL
MULTISTIX LOT#: 5077 NUMERIC
NITRITE, URINE: NEGATIVE
PH, URINE: 6 (ref 4.5–8)
PROTEIN (URINE DIPSTICK): NEGATIVE MG/DL
SPECIFIC GRAVITY: 1 (ref 1–1.03)
URINE-COLOR: YELLOW
UROBILINOGEN,SEMI-QN: 0.2 MG/DL (ref 0–1.9)

## 2021-07-02 PROCEDURE — 81002 URINALYSIS NONAUTO W/O SCOPE: CPT | Performed by: STUDENT IN AN ORGANIZED HEALTH CARE EDUCATION/TRAINING PROGRAM

## 2021-07-02 PROCEDURE — 99213 OFFICE O/P EST LOW 20 MIN: CPT | Performed by: STUDENT IN AN ORGANIZED HEALTH CARE EDUCATION/TRAINING PROGRAM

## 2021-07-02 RX ORDER — PHENAZOPYRIDINE HYDROCHLORIDE 100 MG/1
100 TABLET, FILM COATED ORAL 3 TIMES DAILY PRN
Qty: 60 TABLET | Refills: 0 | Status: SHIPPED | OUTPATIENT
Start: 2021-07-02 | End: 2021-10-08 | Stop reason: ALTCHOICE

## 2021-07-02 NOTE — PROGRESS NOTES
HPI:    Patient ID: Ofelia Davis is a 78year old female. HPI  Pt presenting with urinary frequency. Daughter Argenis Mckeon present for visit. Pt reports onset of urinary frequency on 6/30.  She has been pushing fluids which initially helped, but now UNKNOWN  Codeine                 UNKNOWN    07/02/21  1134   BP: 138/72   Pulse: 66   Resp: 18   Weight: 213 lb (96.6 kg)   Height: 5' 5\" (1.651 m)       Body mass index is 35.45 kg/m². PHYSICAL EXAM:   Physical Exam  Vitals reviewed.    Yanely Routine      Meds This Visit:  Requested Prescriptions     Signed Prescriptions Disp Refills   • Phenazopyridine HCl 100 MG Oral Tab 60 tablet 0     Sig: Take 1 tablet (100 mg total) by mouth 3 (three) times daily as needed for Pain.        Imaging & Referr

## 2021-07-06 ENCOUNTER — PATIENT MESSAGE (OUTPATIENT)
Dept: FAMILY MEDICINE CLINIC | Facility: CLINIC | Age: 81
End: 2021-07-06

## 2021-07-31 ENCOUNTER — HOSPITAL ENCOUNTER (OUTPATIENT)
Dept: MAMMOGRAPHY | Age: 81
Discharge: HOME | End: 2021-07-31
Attending: CLINICAL NURSE SPECIALIST
Payer: MEDICARE

## 2021-07-31 DIAGNOSIS — Z12.31 ENCOUNTER FOR SCREENING MAMMOGRAM FOR MALIGNANT NEOPLASM OF BREAST: ICD-10-CM

## 2021-07-31 PROCEDURE — 77063 BREAST TOMOSYNTHESIS BI: CPT | Performed by: CLINICAL NURSE SPECIALIST

## 2021-07-31 PROCEDURE — 77067 SCR MAMMO BI INCL CAD: CPT | Performed by: CLINICAL NURSE SPECIALIST

## 2021-08-06 ENCOUNTER — OFFICE VISIT (OUTPATIENT)
Dept: PODIATRY CLINIC | Facility: CLINIC | Age: 81
End: 2021-08-06
Payer: MEDICARE

## 2021-08-06 DIAGNOSIS — M79.609 PAIN DUE TO ONYCHOMYCOSIS OF NAIL: ICD-10-CM

## 2021-08-06 DIAGNOSIS — B35.1 PAIN DUE TO ONYCHOMYCOSIS OF NAIL: ICD-10-CM

## 2021-08-06 DIAGNOSIS — M77.42 METATARSALGIA OF LEFT FOOT: ICD-10-CM

## 2021-08-06 DIAGNOSIS — L84 CALLUS OF FOOT: ICD-10-CM

## 2021-08-06 DIAGNOSIS — B35.1 ONYCHOMYCOSIS: Primary | ICD-10-CM

## 2021-08-06 DIAGNOSIS — M21.6X2 PLANTAR FAT PAD ATROPHY OF LEFT FOOT: ICD-10-CM

## 2021-08-06 PROCEDURE — 11721 DEBRIDE NAIL 6 OR MORE: CPT | Performed by: PODIATRIST

## 2021-08-06 RX ORDER — AMLODIPINE BESYLATE 10 MG/1
10 TABLET ORAL DAILY
COMMUNITY
Start: 2021-06-28

## 2021-08-06 NOTE — PROGRESS NOTES
Lindsey Howard is a 78year old female.  Patient presents with:  Toenail Care: Pt here for routine nail care and callus care        HPI:   Pleasant patient returns to the clinic she is again complaining of a painful callus on the left foot this is und complication    • Cataract    • Coronary atherosclerosis    • Essential hypertension    • GERD (gastroesophageal reflux disease)    • High blood pressure    • High cholesterol    • History of stomach ulcers    • Osteoarthritis    • PONV (postoperative naus no vitals taken for this visit. GENERAL: well developed, well nourished, in no apparent distress  EXTREMITIES:   1.  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

## 2021-10-05 ENCOUNTER — OFFICE VISIT (OUTPATIENT)
Dept: DERMATOLOGY CLINIC | Facility: CLINIC | Age: 81
End: 2021-10-05
Payer: MEDICARE

## 2021-10-05 DIAGNOSIS — D48.5 NEOPLASM OF UNCERTAIN BEHAVIOR OF SKIN: Primary | ICD-10-CM

## 2021-10-05 DIAGNOSIS — Z85.828 PERSONAL HISTORY OF SKIN CANCER: ICD-10-CM

## 2021-10-05 DIAGNOSIS — L81.4 SOLAR LENTIGO: ICD-10-CM

## 2021-10-05 DIAGNOSIS — L57.0 ACTINIC KERATOSIS: ICD-10-CM

## 2021-10-05 DIAGNOSIS — L82.1 SEBORRHEIC KERATOSES: ICD-10-CM

## 2021-10-05 PROCEDURE — 99213 OFFICE O/P EST LOW 20 MIN: CPT | Performed by: DERMATOLOGY

## 2021-10-05 PROCEDURE — 88305 TISSUE EXAM BY PATHOLOGIST: CPT | Performed by: DERMATOLOGY

## 2021-10-05 PROCEDURE — 11102 TANGNTL BX SKIN SINGLE LES: CPT | Performed by: DERMATOLOGY

## 2021-10-05 PROCEDURE — 17000 DESTRUCT PREMALG LESION: CPT | Performed by: DERMATOLOGY

## 2021-10-05 NOTE — PROGRESS NOTES
HPI:     Chief Complaint     Derm Problem        HPI     Derm Problem      Additional comments: LOV 5/27/21 - Pt presenting for lesion of concern on upper right arm. Pt states it has been present for a long time.   The lesion was treated with cryotherapy Metoprolol Succinate  MG Oral Tablet 24 Hr Take 100 mg by mouth daily. • aspirin 81 MG Oral Tab EC Take 81 mg by mouth daily.      • Phenazopyridine HCl 100 MG Oral Tab Take 1 tablet (100 mg total) by mouth 3 (three) times daily as needed for Pa Concerns:         Service: Not Asked        Blood Transfusions: Not Asked        Caffeine Concern: Yes        Occupational Exposure: Not Asked        Hobby Hazards: Not Asked        Sleep Concern: Not Asked        Stress Concern: Not Asked        W the Last Year: Not on file      Unstable Housing in the Last Year: Not on file  Family History   Problem Relation Age of Onset   • Stroke Father         CVA   • Lung Disorder Father         lung disease   • Heart Disease Father    • Breast Cancer Mother 77 Jean Pierre Chung MD

## 2021-10-08 ENCOUNTER — OFFICE VISIT (OUTPATIENT)
Dept: PODIATRY CLINIC | Facility: CLINIC | Age: 81
End: 2021-10-08
Payer: MEDICARE

## 2021-10-08 VITALS — BODY MASS INDEX: 34 KG/M2 | WEIGHT: 205 LBS

## 2021-10-08 DIAGNOSIS — M21.6X2 PLANTAR FAT PAD ATROPHY OF LEFT FOOT: ICD-10-CM

## 2021-10-08 DIAGNOSIS — L84 CALLUS OF FOOT: Primary | ICD-10-CM

## 2021-10-08 DIAGNOSIS — M79.609 PAIN DUE TO ONYCHOMYCOSIS OF NAIL: ICD-10-CM

## 2021-10-08 DIAGNOSIS — B35.1 ONYCHOMYCOSIS: ICD-10-CM

## 2021-10-08 DIAGNOSIS — M77.42 METATARSALGIA OF LEFT FOOT: ICD-10-CM

## 2021-10-08 DIAGNOSIS — B35.1 PAIN DUE TO ONYCHOMYCOSIS OF NAIL: ICD-10-CM

## 2021-10-08 PROCEDURE — 11721 DEBRIDE NAIL 6 OR MORE: CPT | Performed by: PODIATRIST

## 2021-10-13 NOTE — PROGRESS NOTES
Lauro Almanza is a 78year old female. Patient presents with:  Toenail Care: patient has callus and in need of toenail care         HPI:   Patient returns to the clinic with her daughter present.   She is here again complaining of painful calluses as 2001    right cheek   • Squamous cell carcinoma 1997    lateral left cheek      Past Surgical History:   Procedure Laterality Date   • ANGIOPLASTY (CORONARY)  2013    multiple angioplasties   • ANGIOPLASTY (CORONARY)  2013 and 2017   • CATH BARE METAL STEN lysis from the nailbed. 2. Vascular: The patient has palpable dorsalis pedis and weak posterior tibial pulses which are symmetrical and equivocal   3. Neurologic: The patient has intact sensorium   4.  Musculoskeletal: Patient has a hallux valgus deformit

## 2021-10-16 ENCOUNTER — OFFICE VISIT (OUTPATIENT)
Dept: FAMILY MEDICINE CLINIC | Facility: CLINIC | Age: 81
End: 2021-10-16
Payer: MEDICARE

## 2021-10-16 VITALS
WEIGHT: 210 LBS | HEIGHT: 65 IN | BODY MASS INDEX: 34.99 KG/M2 | SYSTOLIC BLOOD PRESSURE: 126 MMHG | HEART RATE: 61 BPM | DIASTOLIC BLOOD PRESSURE: 74 MMHG

## 2021-10-16 DIAGNOSIS — L57.8 SUN-DAMAGED SKIN: ICD-10-CM

## 2021-10-16 DIAGNOSIS — Z23 FLU VACCINE NEED: ICD-10-CM

## 2021-10-16 DIAGNOSIS — Z23 IMMUNIZATION DUE: ICD-10-CM

## 2021-10-16 DIAGNOSIS — Z78.0 POSTMENOPAUSAL: ICD-10-CM

## 2021-10-16 DIAGNOSIS — Z85.828 PERSONAL HISTORY OF SKIN CANCER: ICD-10-CM

## 2021-10-16 DIAGNOSIS — R41.3 MEMORY CHANGE: ICD-10-CM

## 2021-10-16 DIAGNOSIS — Z13.6 SCREENING FOR CARDIOVASCULAR CONDITION: ICD-10-CM

## 2021-10-16 DIAGNOSIS — Z00.00 ENCOUNTER FOR ANNUAL HEALTH EXAMINATION: Primary | ICD-10-CM

## 2021-10-16 PROCEDURE — G0439 PPPS, SUBSEQ VISIT: HCPCS | Performed by: STUDENT IN AN ORGANIZED HEALTH CARE EDUCATION/TRAINING PROGRAM

## 2021-10-16 PROCEDURE — 90472 IMMUNIZATION ADMIN EACH ADD: CPT | Performed by: STUDENT IN AN ORGANIZED HEALTH CARE EDUCATION/TRAINING PROGRAM

## 2021-10-16 PROCEDURE — 90750 HZV VACC RECOMBINANT IM: CPT | Performed by: STUDENT IN AN ORGANIZED HEALTH CARE EDUCATION/TRAINING PROGRAM

## 2021-10-16 PROCEDURE — G0008 ADMIN INFLUENZA VIRUS VAC: HCPCS | Performed by: STUDENT IN AN ORGANIZED HEALTH CARE EDUCATION/TRAINING PROGRAM

## 2021-10-16 PROCEDURE — 90662 IIV NO PRSV INCREASED AG IM: CPT | Performed by: STUDENT IN AN ORGANIZED HEALTH CARE EDUCATION/TRAINING PROGRAM

## 2021-10-16 RX ORDER — ZOSTER VACCINE RECOMBINANT, ADJUVANTED 50 MCG/0.5
1 KIT INTRAMUSCULAR ONCE
Qty: 1 EACH | Refills: 1 | Status: SHIPPED | OUTPATIENT
Start: 2021-10-16 | End: 2021-10-16

## 2021-10-16 NOTE — PATIENT INSTRUCTIONS
Kinga Andino's SCREENING SCHEDULE   Tests on this list are recommended by your physician but may not be covered, or covered at this frequency, by your insurer. Please check with your insurance carrier before scheduling to verify coverage.    PRE Scan Never done   Pap and Pelvic    Pap   Covered every 2 years for women at normal risk;  Annually if at high risk -  No recommendations at this time    Chlamydia Annually if high risk -  No recommendations at this time   Screening Mammogram    Mammogram

## 2021-10-16 NOTE — PROGRESS NOTES
HPI:   Tash Hicks is a 78year old female who presents for a Medicare Subsequent Annual Wellness visit (Pt already had Initial Annual Wellness). Pt presenting for annual wellness visit. Daughter is present for visit.  Pt reports memory concer tobacco.    CAGE screening score of 0 on 10/13/2021, showing low risk of alcohol abuse.         Patient Care Team: Patient Care Team:  Anastacia Pinto MD as PCP - General (Family Medicine)  Elana Coffman MD (GASTROENTEROLOGY)  Radha High APRN tablet (500 mg total) by mouth 3 (three) times daily as needed. Atorvastatin Calcium 40 MG Oral Tab, Take 40 mg by mouth daily. Clopidogrel Bisulfate 75 MG Oral Tab, Take 75 mg by mouth daily.     Metoprolol Succinate  MG Oral Tablet 24 Hr, Take 1 index is 34.95 kg/m² as calculated from the following:    Height as of this encounter: 5' 5\" (1.651 m). Weight as of this encounter: 210 lb (95.3 kg).     Medicare Hearing Assessment  (Required for AWV/SWV)    Whispered Voice intact         Visual Acuit WITH DIFFERENTIAL WITH PLATELET; Future    Postmenopausal  - DEXA screening  - continue calcium/vit D supplementation  -     XR DEXA BONE DENSITOMETRY (CPT=39402);  Future    Flu vaccine need  -     FLU VACC HIGH DOSE PRSV FREE    Immunization due  -     Zo never tested or if previously tested but not diagnosed with pre-diabetes   One screening every 6 months if diagnosed with pre-diabetes Lab Results   Component Value Date     (H) 10/23/2021        Cardiovascular Disease Screening    Lipid Panel  Chol recommendations at this time    Immunizations    Influenza Covered once per flu season  Please get every year 10/16/2021  No recommendations at this time    Pneumococcal Each vaccine ([id number] & [id number]) covered once after 65 Prevnar 13: -    Pneumova

## 2021-10-18 ENCOUNTER — TELEPHONE (OUTPATIENT)
Dept: FAMILY MEDICINE CLINIC | Facility: CLINIC | Age: 81
End: 2021-10-18

## 2021-10-18 DIAGNOSIS — R41.3 MEMORY CHANGE: ICD-10-CM

## 2021-10-18 NOTE — TELEPHONE ENCOUNTER
Spoke to [place] at [place], informed this medication is an OTC medication. Prior authorizations are not completed on OTC medications. Script was closed by pharmacy, medication was already discussed with patient.

## 2021-10-18 NOTE — TELEPHONE ENCOUNTER
•  Apoaequorin (PREVAGEN) 10 MG Oral Cap, Take 10 mg by mouth daily. , Disp: 30 capsule, Rfl: 0      Key: [id number]

## 2021-10-21 ENCOUNTER — OFFICE VISIT (OUTPATIENT)
Dept: FAMILY MEDICINE CLINIC | Facility: CLINIC | Age: 81
End: 2021-10-21
Payer: MEDICARE

## 2021-10-21 VITALS
HEIGHT: 65 IN | SYSTOLIC BLOOD PRESSURE: 113 MMHG | WEIGHT: 210 LBS | DIASTOLIC BLOOD PRESSURE: 74 MMHG | HEART RATE: 73 BPM | BODY MASS INDEX: 34.99 KG/M2

## 2021-10-21 DIAGNOSIS — R35.0 FREQUENT URINATION: Primary | ICD-10-CM

## 2021-10-21 PROCEDURE — 81002 URINALYSIS NONAUTO W/O SCOPE: CPT | Performed by: STUDENT IN AN ORGANIZED HEALTH CARE EDUCATION/TRAINING PROGRAM

## 2021-10-21 PROCEDURE — 99213 OFFICE O/P EST LOW 20 MIN: CPT | Performed by: STUDENT IN AN ORGANIZED HEALTH CARE EDUCATION/TRAINING PROGRAM

## 2021-10-21 RX ORDER — CEPHALEXIN 500 MG/1
500 CAPSULE ORAL 2 TIMES DAILY
Qty: 20 CAPSULE | Refills: 0 | Status: SHIPPED | OUTPATIENT
Start: 2021-10-21 | End: 2021-10-31

## 2021-10-23 ENCOUNTER — HOSPITAL ENCOUNTER (OUTPATIENT)
Dept: BONE DENSITY | Age: 81
Discharge: HOME | End: 2021-10-23
Attending: STUDENT IN AN ORGANIZED HEALTH CARE EDUCATION/TRAINING PROGRAM
Payer: MEDICARE

## 2021-10-23 ENCOUNTER — LAB ENCOUNTER (OUTPATIENT)
Dept: LAB | Age: 81
End: 2021-10-23
Attending: STUDENT IN AN ORGANIZED HEALTH CARE EDUCATION/TRAINING PROGRAM
Payer: MEDICARE

## 2021-10-23 DIAGNOSIS — Z00.00 ENCOUNTER FOR ANNUAL HEALTH EXAMINATION: ICD-10-CM

## 2021-10-23 DIAGNOSIS — Z13.6 SCREENING FOR CARDIOVASCULAR CONDITION: ICD-10-CM

## 2021-10-23 DIAGNOSIS — Z78.0 POSTMENOPAUSAL: ICD-10-CM

## 2021-10-23 PROCEDURE — 80053 COMPREHEN METABOLIC PANEL: CPT

## 2021-10-23 PROCEDURE — 85025 COMPLETE CBC W/AUTO DIFF WBC: CPT

## 2021-10-23 PROCEDURE — 36415 COLL VENOUS BLD VENIPUNCTURE: CPT

## 2021-10-23 PROCEDURE — 77080 DXA BONE DENSITY AXIAL: CPT | Performed by: STUDENT IN AN ORGANIZED HEALTH CARE EDUCATION/TRAINING PROGRAM

## 2021-10-23 PROCEDURE — 84439 ASSAY OF FREE THYROXINE: CPT

## 2021-10-23 PROCEDURE — 80061 LIPID PANEL: CPT

## 2021-10-23 PROCEDURE — 84443 ASSAY THYROID STIM HORMONE: CPT

## 2021-10-24 ENCOUNTER — PATIENT MESSAGE (OUTPATIENT)
Dept: FAMILY MEDICINE CLINIC | Facility: CLINIC | Age: 81
End: 2021-10-24

## 2021-10-25 NOTE — TELEPHONE ENCOUNTER
From: Lauro Almanza  To: Kelsy Charles MD  Sent: 10/24/2021 9:07 PM CDT  Subject: Lipid blood test     I have my yearly cardiac check up with Dr. Xena Gtz in December.  Could you please forward the lipid blood test results from Oct. 23rd to him

## 2021-11-08 NOTE — PROGRESS NOTES
HPI:    Patient ID: Juanita Busch is a 78year old female. HPI  Pt presenting with urinary symptoms. Daughter is present for visit. She reports intermittent chills and feeling crummy, has been pushing fluids as tolerated.  She denies any fevers, b Abdominal:      General: Bowel sounds are normal.      Palpations: Abdomen is soft. Tenderness: There is abdominal tenderness (mild suprapubic). There is no right CVA tenderness or left CVA tenderness.    Musculoskeletal:      Cervical back: Normal r

## 2021-11-22 ENCOUNTER — OFFICE VISIT (OUTPATIENT)
Dept: FAMILY MEDICINE CLINIC | Facility: CLINIC | Age: 81
End: 2021-11-22
Payer: MEDICARE

## 2021-11-22 VITALS
WEIGHT: 207 LBS | HEIGHT: 65 IN | BODY MASS INDEX: 34.49 KG/M2 | HEART RATE: 64 BPM | DIASTOLIC BLOOD PRESSURE: 80 MMHG | SYSTOLIC BLOOD PRESSURE: 144 MMHG

## 2021-11-22 DIAGNOSIS — R35.0 FREQUENT URINATION: Primary | ICD-10-CM

## 2021-11-22 DIAGNOSIS — Z71.89 DNR (DO NOT RESUSCITATE) DISCUSSION: ICD-10-CM

## 2021-11-22 PROCEDURE — 81002 URINALYSIS NONAUTO W/O SCOPE: CPT | Performed by: STUDENT IN AN ORGANIZED HEALTH CARE EDUCATION/TRAINING PROGRAM

## 2021-11-22 PROCEDURE — 99213 OFFICE O/P EST LOW 20 MIN: CPT | Performed by: STUDENT IN AN ORGANIZED HEALTH CARE EDUCATION/TRAINING PROGRAM

## 2021-11-22 RX ORDER — CEPHALEXIN 500 MG/1
500 CAPSULE ORAL 2 TIMES DAILY
Qty: 20 CAPSULE | Refills: 0 | Status: SHIPPED | OUTPATIENT
Start: 2021-11-22 | End: 2021-12-02

## 2021-11-22 NOTE — PROGRESS NOTES
HPI:    Patient ID: Nannette Kim is a 78year old female. HPI  Pt presenting with urinary frequency. Daughter is present for visit. She reports increased fatigue on 11/17, improved with pushing fluids.  However she developed urinary frequency and PHYSICAL EXAM:   Physical Exam  Vitals reviewed. Constitutional:       General: She is not in acute distress. HENT:      Head: Normocephalic.    Eyes:      Conjunctiva/sclera: Conjunctivae normal.   Cardiovascular:      Rate and Rhythm: Normal rate and Manual Dip without microscopy [25566]      Urine Culture, Routine      Meds This Visit:  Requested Prescriptions     Signed Prescriptions Disp Refills   • cephalexin (KEFLEX) 500 MG Oral Cap 20 capsule 0     Sig: Take 1 capsule (500 mg total) by mouth 2 (t

## 2021-12-10 ENCOUNTER — NURSE TRIAGE (OUTPATIENT)
Dept: FAMILY MEDICINE CLINIC | Facility: CLINIC | Age: 81
End: 2021-12-10

## 2021-12-10 ENCOUNTER — OFFICE VISIT (OUTPATIENT)
Dept: PODIATRY CLINIC | Facility: CLINIC | Age: 81
End: 2021-12-10
Payer: MEDICARE

## 2021-12-10 VITALS — HEIGHT: 65 IN | BODY MASS INDEX: 34.99 KG/M2 | WEIGHT: 210 LBS

## 2021-12-10 DIAGNOSIS — L84 CALLUS OF FOOT: Primary | ICD-10-CM

## 2021-12-10 DIAGNOSIS — M79.609 PAIN DUE TO ONYCHOMYCOSIS OF NAIL: ICD-10-CM

## 2021-12-10 DIAGNOSIS — B35.1 PAIN DUE TO ONYCHOMYCOSIS OF NAIL: ICD-10-CM

## 2021-12-10 DIAGNOSIS — M21.6X2 PLANTAR FAT PAD ATROPHY OF LEFT FOOT: ICD-10-CM

## 2021-12-10 DIAGNOSIS — M77.42 METATARSALGIA OF LEFT FOOT: ICD-10-CM

## 2021-12-10 DIAGNOSIS — B35.1 ONYCHOMYCOSIS: ICD-10-CM

## 2021-12-10 PROCEDURE — 11721 DEBRIDE NAIL 6 OR MORE: CPT | Performed by: PODIATRIST

## 2021-12-10 NOTE — TELEPHONE ENCOUNTER
Action Requested: Summary for Provider     []  Critical Lab, Recommendations Needed  [] Need Additional Advice  []   FYI    []   Need Orders  [] Need Medications Sent to Pharmacy  []  Other     SUMMARY: Per protocol, patient should been seen today.  Patient

## 2021-12-16 ENCOUNTER — TELEPHONE (OUTPATIENT)
Dept: FAMILY MEDICINE CLINIC | Facility: CLINIC | Age: 81
End: 2021-12-16

## 2021-12-16 ENCOUNTER — OFFICE VISIT (OUTPATIENT)
Dept: FAMILY MEDICINE CLINIC | Facility: CLINIC | Age: 81
End: 2021-12-16
Payer: MEDICARE

## 2021-12-16 VITALS
DIASTOLIC BLOOD PRESSURE: 75 MMHG | BODY MASS INDEX: 34.99 KG/M2 | HEART RATE: 75 BPM | WEIGHT: 210 LBS | HEIGHT: 65 IN | SYSTOLIC BLOOD PRESSURE: 138 MMHG

## 2021-12-16 DIAGNOSIS — R35.0 FREQUENT URINATION: Primary | ICD-10-CM

## 2021-12-16 PROCEDURE — 81002 URINALYSIS NONAUTO W/O SCOPE: CPT | Performed by: STUDENT IN AN ORGANIZED HEALTH CARE EDUCATION/TRAINING PROGRAM

## 2021-12-16 PROCEDURE — 99213 OFFICE O/P EST LOW 20 MIN: CPT | Performed by: STUDENT IN AN ORGANIZED HEALTH CARE EDUCATION/TRAINING PROGRAM

## 2021-12-16 RX ORDER — CEPHALEXIN 500 MG/1
500 CAPSULE ORAL 2 TIMES DAILY
Qty: 14 CAPSULE | Refills: 0 | Status: SHIPPED | OUTPATIENT
Start: 2021-12-16 | End: 2022-01-03

## 2021-12-16 NOTE — TELEPHONE ENCOUNTER
Spoke with patient's daughter Barbara Wang, Patient will be seen today at 4:30 , Ok to double book  Per [address] [place] [address] appt for tomorrow     Future Appointments   Date Time Provider Rigo Johnson   12/16/2021  4:30 PM Laura Keene MD [address] [facility]

## 2021-12-16 NOTE — PROGRESS NOTES
HPI:    Patient ID: Nannette Kim is a 78year old female. HPI  Pt presenting with urinary symptoms. Daughter is present for visit. She reports increased fatigue on 12/10, improved with pushing fluids.  However she developed urinary frequency and acute distress. HENT:      Head: Normocephalic. Eyes:      Conjunctiva/sclera: Conjunctivae normal.   Cardiovascular:      Rate and Rhythm: Normal rate and regular rhythm. Pulses: Normal pulses.    Pulmonary:      Effort: Pulmonary effort is normal.

## 2021-12-16 NOTE — TELEPHONE ENCOUNTER
Patient daughter Barbara Wang calling, identified name and , states patient  frequency, urgency of urine, loss of appetite ,   Hx of frequent  UTI   ( cannot see urologist until  )     Patient does have appt tomorrow but Barbara Wang states symptoms are getting wors

## 2021-12-16 NOTE — TELEPHONE ENCOUNTER
Upon review of clinic schedule, pt has 2 appts scheduled for 12/17 -- per last comment, pt is interested in [address] today? Ok to double book during [employer].

## 2021-12-19 NOTE — PROGRESS NOTES
Charli Fletcher is a 78year old female. Patient presents with:  Toenail Care: painful nails and callus         HPI:   Patient returns to the clinic with her daughter present.   She is here again complaining of painful calluses as well as long thickene ANGIOPLASTY (CORONARY)  2013    multiple angioplasties   • ANGIOPLASTY (CORONARY)  2013 and 2017   • CATH BARE METAL STENT (BMS)     • CATH DRUG ELUTING STENT     • TOTAL KNEE REPLACEMENT        Family History   Problem Relation Age of Onset   • Stroke Fat which are symmetrical and equivocal   3. Neurologic: The patient has intact sensorium   4. Musculoskeletal: Patient has a hallux valgus deformity bilaterally with prominence of the first metatarsal head plantarly.     ASSESSMENT AND PLAN:   Diagnoses and al

## 2021-12-23 ENCOUNTER — TELEPHONE (OUTPATIENT)
Dept: FAMILY MEDICINE CLINIC | Facility: CLINIC | Age: 81
End: 2021-12-23

## 2021-12-23 DIAGNOSIS — Z23 IMMUNIZATION DUE: Primary | ICD-10-CM

## 2021-12-23 NOTE — TELEPHONE ENCOUNTER
I believe with Medicare insurance pts have to go the pharmacy for the shingles vaccine. Called daughter to discuss. Per daughter, pt is aware the shingles vaccine is not covered in the office and she will receive a bill for it, but she would like it done there anyway. Dose #1 given:  Zoster Vaccine Recombinant Adjuvanted (Shingrix) 10/16/2021       Please place orders, thank you!

## 2021-12-27 ENCOUNTER — OFFICE VISIT (OUTPATIENT)
Dept: SURGERY | Facility: CLINIC | Age: 81
End: 2021-12-27
Payer: MEDICARE

## 2021-12-27 VITALS
BODY MASS INDEX: 34.99 KG/M2 | RESPIRATION RATE: 16 BRPM | WEIGHT: 210 LBS | SYSTOLIC BLOOD PRESSURE: 150 MMHG | HEIGHT: 65 IN | HEART RATE: 84 BPM | DIASTOLIC BLOOD PRESSURE: 77 MMHG

## 2021-12-27 DIAGNOSIS — N39.0 RECURRENT UTI: Primary | ICD-10-CM

## 2021-12-27 PROCEDURE — 99204 OFFICE O/P NEW MOD 45 MIN: CPT | Performed by: UROLOGY

## 2021-12-27 RX ORDER — NITROFURANTOIN 25; 75 MG/1; MG/1
100 CAPSULE ORAL NIGHTLY
Qty: 30 CAPSULE | Refills: 3 | Status: SHIPPED | OUTPATIENT
Start: 2021-12-27 | End: 2022-01-03

## 2021-12-27 NOTE — PROGRESS NOTES
[facility]  Initial Office Consultation    HPI:   Harinder Avelar is a 78year old female here today for consultation at the request of, and a copy of this note will be sent to, Pastor Shar MD.  Accompanied by her daughter.     1. Location: Left arm, Patient Position: Sitting, Cuff Size: large)   Pulse 84   Resp 16   Ht 5' 5\" (1.651 m)   Wt 210 lb (95.3 kg)   BMI 34.95 kg/m²     Physical Exam  Vitals reviewed. Constitutional:       General: She is not in acute distress.      Appea low-dose continuous antibiotic prophylaxis with nitrofurantoin 100 mg nightly.       Richie Harper MD  12/27/2021

## 2021-12-28 ENCOUNTER — TELEPHONE (OUTPATIENT)
Dept: SURGERY | Facility: CLINIC | Age: 81
End: 2021-12-28

## 2021-12-28 NOTE — TELEPHONE ENCOUNTER
I called pt daughter, Yue Richmond who was at pt juvencio yesterday with Dr. Vilma Suazo, advised that Dr. Vilma Suazo would like pt to get an ultrasound of the kidneys/bladder before her office cystoscopy, juvencio number given. Pt will have done before the juvencio.

## 2021-12-29 NOTE — TELEPHONE ENCOUNTER
Per Lance Sandoval [facility]) of patient waiting for the response to her message below.

## 2021-12-31 ENCOUNTER — NURSE ONLY (OUTPATIENT)
Dept: FAMILY MEDICINE CLINIC | Facility: CLINIC | Age: 81
End: 2021-12-31
Payer: MEDICARE

## 2021-12-31 DIAGNOSIS — Z23 NEED FOR SHINGLES VACCINE: Primary | ICD-10-CM

## 2021-12-31 PROCEDURE — 90471 IMMUNIZATION ADMIN: CPT | Performed by: STUDENT IN AN ORGANIZED HEALTH CARE EDUCATION/TRAINING PROGRAM

## 2021-12-31 PROCEDURE — 90750 HZV VACC RECOMBINANT IM: CPT | Performed by: STUDENT IN AN ORGANIZED HEALTH CARE EDUCATION/TRAINING PROGRAM

## 2022-01-02 ENCOUNTER — HOSPITAL ENCOUNTER (OUTPATIENT)
Dept: ULTRASOUND IMAGING | Age: 82
Discharge: HOME | End: 2022-01-02
Attending: UROLOGY
Payer: MEDICARE

## 2022-01-02 DIAGNOSIS — N39.0 RECURRENT UTI: ICD-10-CM

## 2022-01-02 PROCEDURE — 76770 US EXAM ABDO BACK WALL COMP: CPT | Performed by: UROLOGY

## 2022-01-03 ENCOUNTER — TELEPHONE (OUTPATIENT)
Dept: SURGERY | Facility: CLINIC | Age: 82
End: 2022-01-03

## 2022-01-03 DIAGNOSIS — N39.0 RECURRENT UTI: Primary | ICD-10-CM

## 2022-01-03 RX ORDER — CEPHALEXIN 250 MG/1
250 CAPSULE ORAL DAILY
Qty: 30 CAPSULE | Refills: 3 | Status: SHIPPED | OUTPATIENT
Start: 2022-01-03

## 2022-01-03 NOTE — TELEPHONE ENCOUNTER
Spoke with daughter and notified her of change in antibiotics, to stop nitrofurantoin and begin cephalexin instead. Daughter verbalized understanding. Pt has cysto scheduled on 1/26/22 with ALFREDO.

## 2022-01-03 NOTE — TELEPHONE ENCOUNTER
Pt's daughter called stating pt is having a side effect from a medication. Upsetting pt's reflux.   Please call

## 2022-01-03 NOTE — TELEPHONE ENCOUNTER
Spoke with daughter. Pt began nitrofurantoin 4 days ago when nausea/vomitting/heartburn began at the same time. Pt has a history of GERD secondary to hiatal hernia, but daughter states symptoms became worse with antibiotic.  Pt stopped taking nitrofurantoin

## 2022-01-24 ENCOUNTER — TELEPHONE (OUTPATIENT)
Dept: SURGERY | Facility: CLINIC | Age: 82
End: 2022-01-24

## 2022-01-24 NOTE — TELEPHONE ENCOUNTER
Spoke with daughter and let her know that our office will be giving antibiotic prior to procedure. Does not need to  anything at the pharmacy. Daughter states pt is currently on low-dose antibiotic daily cephalexin.

## 2022-01-24 NOTE — TELEPHONE ENCOUNTER
Per daughter states pt had knee replacement 17 years ago said on instructions of cysto sheet asked to inform office before hand asking does pt need an antibiotic before procedure please advise

## 2022-01-25 ENCOUNTER — TELEPHONE (OUTPATIENT)
Dept: SURGERY | Facility: CLINIC | Age: 82
End: 2022-01-25

## 2022-01-26 ENCOUNTER — PROCEDURE (OUTPATIENT)
Dept: SURGERY | Facility: CLINIC | Age: 82
End: 2022-01-26
Payer: MEDICARE

## 2022-01-26 VITALS
SYSTOLIC BLOOD PRESSURE: 122 MMHG | BODY MASS INDEX: 35 KG/M2 | WEIGHT: 210 LBS | HEART RATE: 77 BPM | DIASTOLIC BLOOD PRESSURE: 74 MMHG

## 2022-01-26 DIAGNOSIS — N39.0 RECURRENT UTI: Primary | ICD-10-CM

## 2022-01-26 PROCEDURE — 52000 CYSTOURETHROSCOPY: CPT | Performed by: UROLOGY

## 2022-01-26 RX ORDER — CIPROFLOXACIN 500 MG/1
500 TABLET, FILM COATED ORAL ONCE
Status: COMPLETED | OUTPATIENT
Start: 2022-01-26 | End: 2022-01-26

## 2022-01-26 RX ORDER — SUCRALFATE 1 G/1
1 TABLET ORAL
COMMUNITY
Start: 2021-12-27

## 2022-01-26 RX ADMIN — CIPROFLOXACIN 500 MG: 500 TABLET, FILM COATED ORAL at 17:28:00

## 2022-01-26 NOTE — PROGRESS NOTES
[address] [facility]  Follow-Up Visit    HPI: Yury Castro is a 78year old female presents for a follow up visit. Patient was last seen on 12/27/21.     INTERVAL HISTORY: Patient presents for office cystoscopy for further evaluation of recurrent and social history. Reviewed med list and allergies. REVIEW OF SYSTEMS:  Pertinent positives and negatives per HPI. A 5-point ROS was performed and is otherwise negative.        EXAM:  /75 (BP Location: Left arm, Patient Position: Sitting, Cuff renal bladder ultrasound which was essentially unremarkable. Office cystoscopy performed today without any significant findings. Started on low-dose antibiotic prophylaxis with Keflex 250 mg daily.   No breakthrough UTIs or recurrent UTI symptoms for no

## 2022-02-18 ENCOUNTER — OFFICE VISIT (OUTPATIENT)
Dept: PODIATRY CLINIC | Facility: CLINIC | Age: 82
End: 2022-02-18
Payer: MEDICARE

## 2022-02-18 DIAGNOSIS — M79.609 PAIN DUE TO ONYCHOMYCOSIS OF NAIL: ICD-10-CM

## 2022-02-18 DIAGNOSIS — M79.675 PAIN IN TOES OF BOTH FEET: ICD-10-CM

## 2022-02-18 DIAGNOSIS — M21.6X2 PLANTAR FAT PAD ATROPHY OF LEFT FOOT: ICD-10-CM

## 2022-02-18 DIAGNOSIS — B35.1 ONYCHOMYCOSIS: ICD-10-CM

## 2022-02-18 DIAGNOSIS — M79.674 PAIN IN TOES OF BOTH FEET: ICD-10-CM

## 2022-02-18 DIAGNOSIS — L84 CALLUS OF FOOT: Primary | ICD-10-CM

## 2022-02-18 DIAGNOSIS — M77.42 METATARSALGIA OF LEFT FOOT: ICD-10-CM

## 2022-02-18 DIAGNOSIS — B35.1 PAIN DUE TO ONYCHOMYCOSIS OF NAIL: ICD-10-CM

## 2022-02-18 PROCEDURE — 11721 DEBRIDE NAIL 6 OR MORE: CPT | Performed by: PODIATRIST

## 2022-04-06 ENCOUNTER — OFFICE VISIT (OUTPATIENT)
Dept: DERMATOLOGY CLINIC | Facility: CLINIC | Age: 82
End: 2022-04-06
Payer: MEDICARE

## 2022-04-06 DIAGNOSIS — L57.0 ACTINIC KERATOSIS: Primary | ICD-10-CM

## 2022-04-06 DIAGNOSIS — L82.0 INFLAMED SEBORRHEIC KERATOSIS: ICD-10-CM

## 2022-04-06 DIAGNOSIS — L82.1 SEBORRHEIC KERATOSES: ICD-10-CM

## 2022-04-06 DIAGNOSIS — Z85.828 PERSONAL HISTORY OF SKIN CANCER: ICD-10-CM

## 2022-04-06 DIAGNOSIS — D23.9 BENIGN NEOPLASM OF SKIN, UNSPECIFIED LOCATION: ICD-10-CM

## 2022-04-06 DIAGNOSIS — D22.9 MULTIPLE MELANOCYTIC NEVI: ICD-10-CM

## 2022-04-06 PROCEDURE — 17003 DESTRUCT PREMALG LES 2-14: CPT | Performed by: DERMATOLOGY

## 2022-04-06 PROCEDURE — 99213 OFFICE O/P EST LOW 20 MIN: CPT | Performed by: DERMATOLOGY

## 2022-04-06 PROCEDURE — 17000 DESTRUCT PREMALG LESION: CPT | Performed by: DERMATOLOGY

## 2022-04-22 ENCOUNTER — HOSPITAL ENCOUNTER (OUTPATIENT)
Age: 82
Discharge: HOME | End: 2022-04-22
Attending: EMERGENCY MEDICINE
Payer: MEDICARE

## 2022-04-22 ENCOUNTER — APPOINTMENT (OUTPATIENT)
Dept: GENERAL RADIOLOGY | Age: 82
End: 2022-04-22
Attending: EMERGENCY MEDICINE
Payer: MEDICARE

## 2022-04-22 VITALS
HEART RATE: 68 BPM | DIASTOLIC BLOOD PRESSURE: 60 MMHG | RESPIRATION RATE: 18 BRPM | SYSTOLIC BLOOD PRESSURE: 135 MMHG | TEMPERATURE: 98 F | OXYGEN SATURATION: 98 %

## 2022-04-22 DIAGNOSIS — J40 BRONCHITIS: Primary | ICD-10-CM

## 2022-04-22 LAB — SARS-COV-2 RNA RESP QL NAA+PROBE: NOT DETECTED

## 2022-04-22 PROCEDURE — 71046 X-RAY EXAM CHEST 2 VIEWS: CPT | Performed by: EMERGENCY MEDICINE

## 2022-04-22 PROCEDURE — 99214 OFFICE O/P EST MOD 30 MIN: CPT

## 2022-04-22 RX ORDER — PREDNISONE 20 MG/1
40 TABLET ORAL DAILY
Qty: 10 TABLET | Refills: 0 | Status: SHIPPED | OUTPATIENT
Start: 2022-04-22 | End: 2022-04-27

## 2022-04-22 RX ORDER — BENZONATATE 100 MG/1
100 CAPSULE ORAL 3 TIMES DAILY PRN
Qty: 30 CAPSULE | Refills: 0 | Status: SHIPPED | OUTPATIENT
Start: 2022-04-22 | End: 2022-05-22

## 2022-04-22 NOTE — ED INITIAL ASSESSMENT (HPI)
Pt presents with 3 days of cough and congestion. Denies fever, chills, n/v, sob. Cp. Took at home covid test this am, negative.  Concerned because feels that congestion is \"settling in her chest\"

## 2022-04-25 ENCOUNTER — OFFICE VISIT (OUTPATIENT)
Dept: FAMILY MEDICINE CLINIC | Facility: CLINIC | Age: 82
End: 2022-04-25
Payer: MEDICARE

## 2022-04-25 VITALS
WEIGHT: 209 LBS | SYSTOLIC BLOOD PRESSURE: 129 MMHG | BODY MASS INDEX: 34.82 KG/M2 | DIASTOLIC BLOOD PRESSURE: 81 MMHG | HEIGHT: 65 IN | HEART RATE: 59 BPM

## 2022-04-25 DIAGNOSIS — R05.9 COUGH: Primary | ICD-10-CM

## 2022-04-25 DIAGNOSIS — R35.0 URINARY FREQUENCY: ICD-10-CM

## 2022-04-25 PROCEDURE — 99213 OFFICE O/P EST LOW 20 MIN: CPT | Performed by: STUDENT IN AN ORGANIZED HEALTH CARE EDUCATION/TRAINING PROGRAM

## 2022-04-25 RX ORDER — AZITHROMYCIN 250 MG/1
TABLET, FILM COATED ORAL
Qty: 6 TABLET | Refills: 0 | Status: SHIPPED | OUTPATIENT
Start: 2022-04-25 | End: 2022-04-30

## 2022-04-27 ENCOUNTER — OFFICE VISIT (OUTPATIENT)
Dept: SURGERY | Facility: CLINIC | Age: 82
End: 2022-04-27
Payer: MEDICARE

## 2022-04-27 DIAGNOSIS — N39.0 RECURRENT UTI: ICD-10-CM

## 2022-04-27 PROCEDURE — 99213 OFFICE O/P EST LOW 20 MIN: CPT | Performed by: NURSE PRACTITIONER

## 2022-04-27 RX ORDER — CEPHALEXIN 250 MG/1
250 CAPSULE ORAL EVERY OTHER DAY
Qty: 45 CAPSULE | Refills: 1 | Status: SHIPPED | OUTPATIENT
Start: 2022-04-27

## 2022-05-20 ENCOUNTER — OFFICE VISIT (OUTPATIENT)
Dept: PODIATRY CLINIC | Facility: CLINIC | Age: 82
End: 2022-05-20
Payer: MEDICARE

## 2022-05-20 DIAGNOSIS — B35.1 PAIN DUE TO ONYCHOMYCOSIS OF NAIL: ICD-10-CM

## 2022-05-20 DIAGNOSIS — M79.674 PAIN IN TOES OF BOTH FEET: ICD-10-CM

## 2022-05-20 DIAGNOSIS — M79.609 PAIN DUE TO ONYCHOMYCOSIS OF NAIL: ICD-10-CM

## 2022-05-20 DIAGNOSIS — M79.675 PAIN IN TOES OF BOTH FEET: ICD-10-CM

## 2022-05-20 DIAGNOSIS — M21.6X2 PLANTAR FAT PAD ATROPHY OF LEFT FOOT: ICD-10-CM

## 2022-05-20 DIAGNOSIS — L84 CALLUS OF FOOT: Primary | ICD-10-CM

## 2022-05-20 DIAGNOSIS — B35.1 ONYCHOMYCOSIS: ICD-10-CM

## 2022-05-20 DIAGNOSIS — M77.42 METATARSALGIA OF LEFT FOOT: ICD-10-CM

## 2022-07-18 ENCOUNTER — NURSE TRIAGE (OUTPATIENT)
Dept: FAMILY MEDICINE CLINIC | Facility: CLINIC | Age: 82
End: 2022-07-18

## 2022-07-18 DIAGNOSIS — U07.1 COVID-19 VIRUS INFECTION: Primary | ICD-10-CM

## 2022-07-18 NOTE — TELEPHONE ENCOUNTER
Per chart review, pt on multiple medications requiring adjustment while taking Paxlovid dosing. Amlodipine -- half dose while taking Paxlovid, until 2 days after completion. Clopidogrel -- continue but add ASA 81mg daily while taking Paxlovid. Atorvastatin -- hold statin while taking Paxlovid, until 2 days after completion. Alternative is to proceed to  for antibody infusion evaluation. FYI Paxlovid dosing to be adjusted due to GFR <60.

## 2022-07-18 NOTE — TELEPHONE ENCOUNTER
The daughter stated the patient tested positive for covid yesterday with mild symptoms. I asked the patient to call us and we can triage her to see what we can do for her. The patient has heart symptoms. Per the daughter she will have the patient call us back.

## 2022-07-18 NOTE — TELEPHONE ENCOUNTER
Reviewed treatment recommendations with daughter, verbalized understanding and agreed. Prefers to start patient on the Paxlovid. Advised call back if any concerns or side effects, daughter agreed. Patient took all her medications this morning.  Daughter will  script later today and have patient begin tomorrow. (fyi)

## 2022-08-03 ENCOUNTER — OFFICE VISIT (OUTPATIENT)
Dept: PODIATRY CLINIC | Facility: CLINIC | Age: 82
End: 2022-08-03
Payer: MEDICARE

## 2022-08-03 DIAGNOSIS — M21.611 BILATERAL BUNIONS: ICD-10-CM

## 2022-08-03 DIAGNOSIS — M21.612 BILATERAL BUNIONS: ICD-10-CM

## 2022-08-03 DIAGNOSIS — L97.521 NEUROPATHIC ULCER OF LEFT FOOT, LIMITED TO BREAKDOWN OF SKIN (HCC): ICD-10-CM

## 2022-08-03 DIAGNOSIS — G60.9 IDIOPATHIC POLYNEUROPATHY: Primary | ICD-10-CM

## 2022-08-03 DIAGNOSIS — L84 FOOT CALLUS: ICD-10-CM

## 2022-08-03 DIAGNOSIS — B35.1 ONYCHOMYCOSIS: ICD-10-CM

## 2022-08-03 PROCEDURE — 11721 DEBRIDE NAIL 6 OR MORE: CPT | Performed by: PODIATRIST

## 2022-08-03 PROCEDURE — 99213 OFFICE O/P EST LOW 20 MIN: CPT | Performed by: PODIATRIST

## 2022-08-03 PROCEDURE — 11056 PARNG/CUTG B9 HYPRKR LES 2-4: CPT | Performed by: PODIATRIST

## 2022-10-04 ENCOUNTER — IMMUNIZATION (OUTPATIENT)
Dept: LAB | Age: 82
End: 2022-10-04
Attending: EMERGENCY MEDICINE
Payer: MEDICARE

## 2022-10-04 ENCOUNTER — NURSE ONLY (OUTPATIENT)
Dept: LAB | Age: 82
End: 2022-10-04
Attending: EMERGENCY MEDICINE
Payer: MEDICARE

## 2022-10-04 DIAGNOSIS — Z23 NEED FOR VACCINATION: Primary | ICD-10-CM

## 2022-10-04 PROCEDURE — 90471 IMMUNIZATION ADMIN: CPT

## 2022-10-04 PROCEDURE — 0134A SARSCOV2 VAC BVL 50MCG/0.5ML: CPT

## 2022-10-04 PROCEDURE — 90662 IIV NO PRSV INCREASED AG IM: CPT

## 2022-10-05 ENCOUNTER — APPOINTMENT (OUTPATIENT)
Dept: GENERAL RADIOLOGY | Age: 82
End: 2022-10-05
Attending: EMERGENCY MEDICINE
Payer: MEDICARE

## 2022-10-05 ENCOUNTER — HOSPITAL ENCOUNTER (OUTPATIENT)
Age: 82
Discharge: HOME | End: 2022-10-05
Attending: EMERGENCY MEDICINE
Payer: MEDICARE

## 2022-10-05 VITALS
SYSTOLIC BLOOD PRESSURE: 126 MMHG | RESPIRATION RATE: 20 BRPM | HEIGHT: 65 IN | DIASTOLIC BLOOD PRESSURE: 67 MMHG | HEART RATE: 75 BPM | TEMPERATURE: 97 F | BODY MASS INDEX: 33.32 KG/M2 | OXYGEN SATURATION: 96 % | WEIGHT: 200 LBS

## 2022-10-05 DIAGNOSIS — B34.9 VIRAL SYNDROME: Primary | ICD-10-CM

## 2022-10-05 LAB
#MXD IC: 1 X10ˆ3/UL (ref 0.1–1)
BILIRUB UR QL STRIP: NEGATIVE
BUN BLD-MCNC: 16 MG/DL (ref 7–18)
CHLORIDE BLD-SCNC: 101 MMOL/L (ref 98–112)
CLARITY UR: CLEAR
CO2 BLD-SCNC: 21 MMOL/L (ref 21–32)
COLOR UR: YELLOW
CREAT BLD-MCNC: 1.2 MG/DL
GFR SERPLBLD BASED ON 1.73 SQ M-ARVRAT: 45 ML/MIN/1.73M2 (ref 60–?)
GLUCOSE BLD-MCNC: 124 MG/DL (ref 70–99)
GLUCOSE UR STRIP-MCNC: NEGATIVE MG/DL
HCT VFR BLD AUTO: 43.9 %
HCT VFR BLD CALC: 48 %
HGB BLD-MCNC: 13.3 G/DL
HGB UR QL STRIP: NEGATIVE
ISTAT IONIZED CALCIUM FOR CHEM 8: 1.17 MMOL/L (ref 1.12–1.32)
KETONES UR STRIP-MCNC: NEGATIVE MG/DL
LEUKOCYTE ESTERASE UR QL STRIP: NEGATIVE
LYMPHOCYTES # BLD AUTO: 2.8 X10ˆ3/UL (ref 1–4)
LYMPHOCYTES NFR BLD AUTO: 21.8 %
MCH RBC QN AUTO: 24.7 PG (ref 26–34)
MCHC RBC AUTO-ENTMCNC: 30.3 G/DL (ref 31–37)
MCV RBC AUTO: 81.6 FL (ref 80–100)
MIXED CELL %: 7.8 %
NEUTROPHILS # BLD AUTO: 9.1 X10ˆ3/UL (ref 1.5–7.7)
NEUTROPHILS NFR BLD AUTO: 70.4 %
NITRITE UR QL STRIP: NEGATIVE
PH UR STRIP: 5.5 [PH]
PLATELET # BLD AUTO: 508 X10ˆ3/UL (ref 150–450)
POTASSIUM BLD-SCNC: 3.7 MMOL/L (ref 3.6–5.1)
RBC # BLD AUTO: 5.38 X10ˆ6/UL
SARS-COV-2 RNA RESP QL NAA+PROBE: NOT DETECTED
SODIUM BLD-SCNC: 138 MMOL/L (ref 136–145)
SP GR UR STRIP: 1.02
TROPONIN I BLD-MCNC: 0.02 NG/ML
UROBILINOGEN UR STRIP-ACNC: <2 MG/DL
WBC # BLD AUTO: 12.9 X10ˆ3/UL (ref 4–11)

## 2022-10-05 PROCEDURE — 36415 COLL VENOUS BLD VENIPUNCTURE: CPT

## 2022-10-05 PROCEDURE — 71046 X-RAY EXAM CHEST 2 VIEWS: CPT | Performed by: EMERGENCY MEDICINE

## 2022-10-05 PROCEDURE — 93005 ELECTROCARDIOGRAM TRACING: CPT

## 2022-10-05 PROCEDURE — 84484 ASSAY OF TROPONIN QUANT: CPT

## 2022-10-05 PROCEDURE — 85025 COMPLETE CBC W/AUTO DIFF WBC: CPT | Performed by: EMERGENCY MEDICINE

## 2022-10-05 PROCEDURE — 99214 OFFICE O/P EST MOD 30 MIN: CPT

## 2022-10-05 PROCEDURE — 93010 ELECTROCARDIOGRAM REPORT: CPT | Performed by: EMERGENCY MEDICINE

## 2022-10-05 PROCEDURE — 80047 BASIC METABLC PNL IONIZED CA: CPT

## 2022-10-05 PROCEDURE — 81002 URINALYSIS NONAUTO W/O SCOPE: CPT

## 2022-10-05 NOTE — ED INITIAL ASSESSMENT (HPI)
Per pt on Saturday having congestion with chronic mild cough started to feel better, yesterday had flu and covid booster vaccine and after that started to feel unwell. Pt with worse cough fatigue and abdominal discomfort. Denies any fevers.

## 2022-10-11 ENCOUNTER — OFFICE VISIT (OUTPATIENT)
Dept: PODIATRY CLINIC | Facility: CLINIC | Age: 82
End: 2022-10-11
Payer: MEDICARE

## 2022-10-11 DIAGNOSIS — G60.9 IDIOPATHIC POLYNEUROPATHY: Primary | ICD-10-CM

## 2022-10-11 DIAGNOSIS — L84 FOOT CALLUS: ICD-10-CM

## 2022-10-11 DIAGNOSIS — M21.612 BILATERAL BUNIONS: ICD-10-CM

## 2022-10-11 DIAGNOSIS — M21.611 BILATERAL BUNIONS: ICD-10-CM

## 2022-10-11 DIAGNOSIS — B35.1 ONYCHOMYCOSIS: ICD-10-CM

## 2022-10-11 PROCEDURE — 11056 PARNG/CUTG B9 HYPRKR LES 2-4: CPT | Performed by: PODIATRIST

## 2022-10-11 PROCEDURE — 11721 DEBRIDE NAIL 6 OR MORE: CPT | Performed by: PODIATRIST

## 2022-10-22 ENCOUNTER — OFFICE VISIT (OUTPATIENT)
Dept: FAMILY MEDICINE CLINIC | Facility: CLINIC | Age: 82
End: 2022-10-22
Payer: MEDICARE

## 2022-10-22 VITALS
RESPIRATION RATE: 18 BRPM | SYSTOLIC BLOOD PRESSURE: 123 MMHG | HEART RATE: 60 BPM | HEIGHT: 65 IN | WEIGHT: 205 LBS | DIASTOLIC BLOOD PRESSURE: 74 MMHG | BODY MASS INDEX: 34.16 KG/M2

## 2022-10-22 DIAGNOSIS — I10 PRIMARY HYPERTENSION: ICD-10-CM

## 2022-10-22 DIAGNOSIS — Z78.0 POSTMENOPAUSAL: ICD-10-CM

## 2022-10-22 DIAGNOSIS — K21.9 GASTROESOPHAGEAL REFLUX DISEASE WITHOUT ESOPHAGITIS: ICD-10-CM

## 2022-10-22 DIAGNOSIS — N39.0 RECURRENT UTI: ICD-10-CM

## 2022-10-22 DIAGNOSIS — K44.9 HIATAL HERNIA: ICD-10-CM

## 2022-10-22 DIAGNOSIS — E78.5 HYPERLIPIDEMIA, UNSPECIFIED HYPERLIPIDEMIA TYPE: ICD-10-CM

## 2022-10-22 DIAGNOSIS — Z00.00 ENCOUNTER FOR ANNUAL HEALTH EXAMINATION: Primary | ICD-10-CM

## 2022-10-22 DIAGNOSIS — I25.10 CORONARY ARTERY DISEASE INVOLVING NATIVE CORONARY ARTERY OF NATIVE HEART WITHOUT ANGINA PECTORIS: ICD-10-CM

## 2022-10-22 DIAGNOSIS — Z85.828 PERSONAL HISTORY OF SKIN CANCER: ICD-10-CM

## 2022-10-22 PROCEDURE — G0439 PPPS, SUBSEQ VISIT: HCPCS | Performed by: STUDENT IN AN ORGANIZED HEALTH CARE EDUCATION/TRAINING PROGRAM

## 2022-10-25 ENCOUNTER — TELEPHONE (OUTPATIENT)
Dept: SURGERY | Facility: CLINIC | Age: 82
End: 2022-10-25

## 2022-10-25 NOTE — TELEPHONE ENCOUNTER
-LMTCB on VM with Dtr Apollo Nuñez greeting.  -Pt has OV 10/26/22 @ 5:15pm; requesting pt to change to 3:15pm.  -Outcome pending.

## 2022-10-26 ENCOUNTER — OFFICE VISIT (OUTPATIENT)
Dept: SURGERY | Facility: CLINIC | Age: 82
End: 2022-10-26
Payer: MEDICARE

## 2022-10-26 DIAGNOSIS — N39.0 RECURRENT UTI: Primary | ICD-10-CM

## 2022-10-26 PROCEDURE — 99213 OFFICE O/P EST LOW 20 MIN: CPT | Performed by: NURSE PRACTITIONER

## 2022-10-29 ENCOUNTER — LAB ENCOUNTER (OUTPATIENT)
Dept: LAB | Age: 82
End: 2022-10-29
Attending: STUDENT IN AN ORGANIZED HEALTH CARE EDUCATION/TRAINING PROGRAM
Payer: MEDICARE

## 2022-10-29 DIAGNOSIS — N39.0 RECURRENT UTI: ICD-10-CM

## 2022-10-29 DIAGNOSIS — Z13.6 SCREENING FOR CARDIOVASCULAR CONDITION: ICD-10-CM

## 2022-10-29 DIAGNOSIS — Z00.00 ENCOUNTER FOR ANNUAL HEALTH EXAMINATION: ICD-10-CM

## 2022-10-29 LAB
ALBUMIN SERPL-MCNC: 3 G/DL (ref 3.4–5)
ALBUMIN/GLOB SERPL: 0.8 {RATIO} (ref 1–2)
ALP LIVER SERPL-CCNC: 116 U/L
ALT SERPL-CCNC: 26 U/L
ANION GAP SERPL CALC-SCNC: 6 MMOL/L (ref 0–18)
AST SERPL-CCNC: 18 U/L (ref 15–37)
BASOPHILS # BLD AUTO: 0.04 X10(3) UL (ref 0–0.2)
BASOPHILS NFR BLD AUTO: 0.4 %
BILIRUB SERPL-MCNC: 0.6 MG/DL (ref 0.1–2)
BILIRUB UR QL: NEGATIVE
BUN BLD-MCNC: 21 MG/DL (ref 7–18)
BUN/CREAT SERPL: 16.8 (ref 10–20)
CALCIUM BLD-MCNC: 8.9 MG/DL (ref 8.5–10.1)
CHLORIDE SERPL-SCNC: 106 MMOL/L (ref 98–112)
CHOLEST SERPL-MCNC: 157 MG/DL (ref ?–200)
CO2 SERPL-SCNC: 27 MMOL/L (ref 21–32)
COLOR UR: YELLOW
CREAT BLD-MCNC: 1.25 MG/DL
DEPRECATED RDW RBC AUTO: 51 FL (ref 35.1–46.3)
EOSINOPHIL # BLD AUTO: 0.32 X10(3) UL (ref 0–0.7)
EOSINOPHIL NFR BLD AUTO: 3.4 %
ERYTHROCYTE [DISTWIDTH] IN BLOOD BY AUTOMATED COUNT: 16.2 % (ref 11–15)
FASTING PATIENT LIPID ANSWER: YES
FASTING STATUS PATIENT QL REPORTED: YES
GFR SERPLBLD BASED ON 1.73 SQ M-ARVRAT: 43 ML/MIN/1.73M2 (ref 60–?)
GLOBULIN PLAS-MCNC: 3.7 G/DL (ref 2.8–4.4)
GLUCOSE BLD-MCNC: 105 MG/DL (ref 70–99)
GLUCOSE UR-MCNC: NEGATIVE MG/DL
HCT VFR BLD AUTO: 43.5 %
HDLC SERPL-MCNC: 71 MG/DL (ref 40–59)
HGB BLD-MCNC: 12.9 G/DL
HGB UR QL STRIP.AUTO: NEGATIVE
HYALINE CASTS #/AREA URNS AUTO: PRESENT /LPF
IMM GRANULOCYTES # BLD AUTO: 0.02 X10(3) UL (ref 0–1)
IMM GRANULOCYTES NFR BLD: 0.2 %
KETONES UR-MCNC: NEGATIVE MG/DL
LDLC SERPL CALC-MCNC: 71 MG/DL (ref ?–100)
LEUKOCYTE ESTERASE UR QL STRIP.AUTO: NEGATIVE
LYMPHOCYTES # BLD AUTO: 1.83 X10(3) UL (ref 1–4)
LYMPHOCYTES NFR BLD AUTO: 19.6 %
MCH RBC QN AUTO: 25.1 PG (ref 26–34)
MCHC RBC AUTO-ENTMCNC: 29.7 G/DL (ref 31–37)
MCV RBC AUTO: 84.6 FL
MONOCYTES # BLD AUTO: 0.65 X10(3) UL (ref 0.1–1)
MONOCYTES NFR BLD AUTO: 7 %
NEUTROPHILS # BLD AUTO: 6.47 X10 (3) UL (ref 1.5–7.7)
NEUTROPHILS # BLD AUTO: 6.47 X10(3) UL (ref 1.5–7.7)
NEUTROPHILS NFR BLD AUTO: 69.4 %
NITRITE UR QL STRIP.AUTO: NEGATIVE
NONHDLC SERPL-MCNC: 86 MG/DL (ref ?–130)
OSMOLALITY SERPL CALC.SUM OF ELEC: 291 MOSM/KG (ref 275–295)
PH UR: 6 [PH] (ref 5–8)
PLATELET # BLD AUTO: 370 10(3)UL (ref 150–450)
POTASSIUM SERPL-SCNC: 4.6 MMOL/L (ref 3.5–5.1)
PROT SERPL-MCNC: 6.7 G/DL (ref 6.4–8.2)
PROT UR-MCNC: NEGATIVE MG/DL
RBC # BLD AUTO: 5.14 X10(6)UL
SODIUM SERPL-SCNC: 139 MMOL/L (ref 136–145)
SP GR UR STRIP: 1.01 (ref 1–1.03)
T4 FREE SERPL-MCNC: 1.1 NG/DL (ref 0.8–1.7)
TRIGL SERPL-MCNC: 81 MG/DL (ref 30–149)
TSI SER-ACNC: 4.85 MIU/ML (ref 0.36–3.74)
UROBILINOGEN UR STRIP-ACNC: <2
VIT C UR-MCNC: NEGATIVE MG/DL
VLDLC SERPL CALC-MCNC: 12 MG/DL (ref 0–30)
WBC # BLD AUTO: 9.3 X10(3) UL (ref 4–11)

## 2022-10-29 PROCEDURE — 36415 COLL VENOUS BLD VENIPUNCTURE: CPT

## 2022-10-29 PROCEDURE — 84439 ASSAY OF FREE THYROXINE: CPT

## 2022-10-29 PROCEDURE — 80053 COMPREHEN METABOLIC PANEL: CPT

## 2022-10-29 PROCEDURE — 85025 COMPLETE CBC W/AUTO DIFF WBC: CPT

## 2022-10-29 PROCEDURE — 84443 ASSAY THYROID STIM HORMONE: CPT

## 2022-10-29 PROCEDURE — 81001 URINALYSIS AUTO W/SCOPE: CPT

## 2022-10-29 PROCEDURE — 80061 LIPID PANEL: CPT

## 2022-12-20 ENCOUNTER — OFFICE VISIT (OUTPATIENT)
Dept: PODIATRY CLINIC | Facility: CLINIC | Age: 82
End: 2022-12-20
Payer: MEDICARE

## 2022-12-20 DIAGNOSIS — G60.9 IDIOPATHIC POLYNEUROPATHY: Primary | ICD-10-CM

## 2022-12-20 DIAGNOSIS — B35.1 ONYCHOMYCOSIS: ICD-10-CM

## 2022-12-20 DIAGNOSIS — L84 FOOT CALLUS: ICD-10-CM

## 2022-12-20 DIAGNOSIS — M21.611 BILATERAL BUNIONS: ICD-10-CM

## 2022-12-20 DIAGNOSIS — M21.612 BILATERAL BUNIONS: ICD-10-CM

## 2022-12-20 PROCEDURE — 11721 DEBRIDE NAIL 6 OR MORE: CPT | Performed by: PODIATRIST

## 2022-12-20 PROCEDURE — 11056 PARNG/CUTG B9 HYPRKR LES 2-4: CPT | Performed by: PODIATRIST

## 2023-02-07 ENCOUNTER — PATIENT MESSAGE (OUTPATIENT)
Dept: FAMILY MEDICINE CLINIC | Facility: CLINIC | Age: 83
End: 2023-02-07

## 2023-02-07 NOTE — TELEPHONE ENCOUNTER
From: Cheikh Friends  To: Lia Wayne MD  Sent: 2/7/2023 10:34 AM CST  Subject: Blood test    Dr. Bud Doran has an order in for new blood work.  Does she want my mom Wheelwright to fast?     Francoisejosefinavernon Myers, daughter

## 2023-02-11 ENCOUNTER — LAB ENCOUNTER (OUTPATIENT)
Dept: LAB | Age: 83
End: 2023-02-11
Attending: STUDENT IN AN ORGANIZED HEALTH CARE EDUCATION/TRAINING PROGRAM
Payer: MEDICARE

## 2023-02-11 DIAGNOSIS — E03.9 HYPOTHYROIDISM, UNSPECIFIED TYPE: ICD-10-CM

## 2023-02-11 DIAGNOSIS — R94.4 DECREASED GLOMERULAR FILTRATION RATE (GFR): ICD-10-CM

## 2023-02-11 LAB
ALBUMIN SERPL-MCNC: 3.1 G/DL (ref 3.4–5)
ALBUMIN/GLOB SERPL: 0.8 {RATIO} (ref 1–2)
ALP LIVER SERPL-CCNC: 162 U/L
ALT SERPL-CCNC: 18 U/L
ANION GAP SERPL CALC-SCNC: 6 MMOL/L (ref 0–18)
AST SERPL-CCNC: 17 U/L (ref 15–37)
BILIRUB SERPL-MCNC: 0.6 MG/DL (ref 0.1–2)
BUN BLD-MCNC: 14 MG/DL (ref 7–18)
BUN/CREAT SERPL: 11.6 (ref 10–20)
CALCIUM BLD-MCNC: 8.7 MG/DL (ref 8.5–10.1)
CHLORIDE SERPL-SCNC: 106 MMOL/L (ref 98–112)
CO2 SERPL-SCNC: 28 MMOL/L (ref 21–32)
CREAT BLD-MCNC: 1.21 MG/DL
FASTING STATUS PATIENT QL REPORTED: YES
GFR SERPLBLD BASED ON 1.73 SQ M-ARVRAT: 44 ML/MIN/1.73M2 (ref 60–?)
GLOBULIN PLAS-MCNC: 3.9 G/DL (ref 2.8–4.4)
GLUCOSE BLD-MCNC: 115 MG/DL (ref 70–99)
OSMOLALITY SERPL CALC.SUM OF ELEC: 291 MOSM/KG (ref 275–295)
POTASSIUM SERPL-SCNC: 4.5 MMOL/L (ref 3.5–5.1)
PROT SERPL-MCNC: 7 G/DL (ref 6.4–8.2)
SODIUM SERPL-SCNC: 140 MMOL/L (ref 136–145)
T4 FREE SERPL-MCNC: 1 NG/DL (ref 0.8–1.7)
TSI SER-ACNC: 5.63 MIU/ML (ref 0.36–3.74)

## 2023-02-11 PROCEDURE — 84443 ASSAY THYROID STIM HORMONE: CPT

## 2023-02-11 PROCEDURE — 36415 COLL VENOUS BLD VENIPUNCTURE: CPT

## 2023-02-11 PROCEDURE — 80053 COMPREHEN METABOLIC PANEL: CPT

## 2023-02-11 PROCEDURE — 84439 ASSAY OF FREE THYROXINE: CPT

## 2023-02-21 ENCOUNTER — TELEPHONE (OUTPATIENT)
Dept: SURGERY | Facility: CLINIC | Age: 83
End: 2023-02-21

## 2023-02-21 ENCOUNTER — LAB ENCOUNTER (OUTPATIENT)
Dept: LAB | Age: 83
End: 2023-02-21
Attending: NURSE PRACTITIONER
Payer: MEDICARE

## 2023-02-21 DIAGNOSIS — N39.0 RECURRENT UTI: ICD-10-CM

## 2023-02-21 DIAGNOSIS — N39.0 RECURRENT UTI: Primary | ICD-10-CM

## 2023-02-21 LAB
BILIRUB UR QL: NEGATIVE
CLARITY UR: CLEAR
COLOR UR: COLORLESS
GLUCOSE UR-MCNC: NORMAL MG/DL
HGB UR QL STRIP.AUTO: NEGATIVE
KETONES UR-MCNC: NEGATIVE MG/DL
LEUKOCYTE ESTERASE UR QL STRIP.AUTO: NEGATIVE
NITRITE UR QL STRIP.AUTO: NEGATIVE
PH UR: 5 [PH] (ref 5–8)
PROT UR-MCNC: NEGATIVE MG/DL
SP GR UR STRIP: <1.005 (ref 1–1.03)
UROBILINOGEN UR STRIP-ACNC: NORMAL

## 2023-02-21 NOTE — TELEPHONE ENCOUNTER
Patients daughter states that she dropped off the patients sample of urine at the [facility] this morning, but they will need to get an order for the urine test.

## 2023-02-21 NOTE — TELEPHONE ENCOUNTER
Spoke with patients daughter. She states she is not currently with patient. She states they were advised by Jacinta Pickard they could just drop off a urine to the lab for analysis when she is having UTI symptoms. She states patient has had urinary frequency for the past couple of days. She states that patient has had a cold and is not sure if they are associated. I advised her I do not see a standing order or active order for urinalysis. I advised her that I will change reason for call to acute and one of the nurses will give the patient a call. She states patient relies on her for transportation.      Future Appointments   Date Time Provider Rigo Johnson   2/28/2023  5:30 PM Kizzy Aceves [facility] [place]   4/13/2023 10:45 AM Familia Mckyo MD [facility] [place]   4/26/2023  4:45 PM KayliEugenie APRN [facility]

## 2023-02-21 NOTE — TELEPHONE ENCOUNTER
-S/w pt; identity verified with name & .  -Pt reports she \"normally gets up X5 during the night\" to void.  -On 23, she started having urgency; no burning; today T-97.0.  -Dtr brought specimen to [place] this am; I placed order for Culture Reflex.  -Encounter complete.

## 2023-02-27 ENCOUNTER — OFFICE VISIT (OUTPATIENT)
Dept: FAMILY MEDICINE CLINIC | Facility: CLINIC | Age: 83
End: 2023-02-27

## 2023-02-27 VITALS
HEIGHT: 65 IN | DIASTOLIC BLOOD PRESSURE: 70 MMHG | WEIGHT: 202 LBS | BODY MASS INDEX: 33.66 KG/M2 | SYSTOLIC BLOOD PRESSURE: 123 MMHG | OXYGEN SATURATION: 96 % | HEART RATE: 67 BPM

## 2023-02-27 DIAGNOSIS — R05.1 ACUTE COUGH: Primary | ICD-10-CM

## 2023-02-27 PROCEDURE — 99213 OFFICE O/P EST LOW 20 MIN: CPT | Performed by: STUDENT IN AN ORGANIZED HEALTH CARE EDUCATION/TRAINING PROGRAM

## 2023-02-27 RX ORDER — AZITHROMYCIN 250 MG/1
TABLET, FILM COATED ORAL
Qty: 6 TABLET | Refills: 0 | Status: SHIPPED | OUTPATIENT
Start: 2023-02-27 | End: 2023-03-04

## 2023-02-27 RX ORDER — FLUTICASONE PROPIONATE 50 MCG
1 SPRAY, SUSPENSION (ML) NASAL DAILY
Qty: 1 EACH | Refills: 0 | Status: SHIPPED | OUTPATIENT
Start: 2023-02-27

## 2023-02-28 ENCOUNTER — OFFICE VISIT (OUTPATIENT)
Dept: PODIATRY CLINIC | Facility: CLINIC | Age: 83
End: 2023-02-28

## 2023-02-28 DIAGNOSIS — M21.611 BILATERAL BUNIONS: ICD-10-CM

## 2023-02-28 DIAGNOSIS — B35.1 ONYCHOMYCOSIS: ICD-10-CM

## 2023-02-28 DIAGNOSIS — M21.612 BILATERAL BUNIONS: ICD-10-CM

## 2023-02-28 DIAGNOSIS — G60.9 IDIOPATHIC POLYNEUROPATHY: Primary | ICD-10-CM

## 2023-02-28 DIAGNOSIS — L84 FOOT CALLUS: ICD-10-CM

## 2023-02-28 PROCEDURE — 11056 PARNG/CUTG B9 HYPRKR LES 2-4: CPT | Performed by: PODIATRIST

## 2023-02-28 PROCEDURE — 11721 DEBRIDE NAIL 6 OR MORE: CPT | Performed by: PODIATRIST

## 2023-02-28 PROCEDURE — 1125F AMNT PAIN NOTED PAIN PRSNT: CPT | Performed by: PODIATRIST

## 2023-03-22 ENCOUNTER — TELEPHONE (OUTPATIENT)
Dept: FAMILY MEDICINE CLINIC | Facility: CLINIC | Age: 83
End: 2023-03-22

## 2023-03-22 DIAGNOSIS — N39.0 RECURRENT UTI: Primary | ICD-10-CM

## 2023-03-22 NOTE — TELEPHONE ENCOUNTER
Spoke with patient daughter Rody Izquierdo , (  Name and  verified ) informed of 's  instructions below    Provided locations/ hours for [facility] lab       Patient daughter verbalizes understanding and agrees with plan.

## 2023-03-22 NOTE — TELEPHONE ENCOUNTER
Daughter states she believed Dr. Megha Brady had ordered a standing order for UA/UC due to patient's frequent UTI's. States she called the lab today, but informed there was no order. Daughter requesting her mom's urine be tested today as she's had urine urgency with chills since am.  Denies all other symptoms - hematuria, fever, abdominal/black/flank pain, dysuria. Please advise.

## 2023-03-30 ENCOUNTER — LAB ENCOUNTER (OUTPATIENT)
Dept: LAB | Age: 83
End: 2023-03-30
Attending: STUDENT IN AN ORGANIZED HEALTH CARE EDUCATION/TRAINING PROGRAM
Payer: MEDICARE

## 2023-03-30 DIAGNOSIS — N39.0 RECURRENT UTI: ICD-10-CM

## 2023-03-30 LAB
BILIRUB UR QL: NEGATIVE
GLUCOSE UR-MCNC: NORMAL MG/DL
KETONES UR-MCNC: NEGATIVE MG/DL
LEUKOCYTE ESTERASE UR QL STRIP.AUTO: 500
NITRITE UR QL STRIP.AUTO: NEGATIVE
PH UR: 6 [PH] (ref 5–8)
PROT UR-MCNC: 100 MG/DL
RBC #/AREA URNS AUTO: >10 /HPF
SP GR UR STRIP: 1.02 (ref 1–1.03)
UROBILINOGEN UR STRIP-ACNC: NORMAL
WBC #/AREA URNS AUTO: >50 /HPF
WBC CLUMPS UR QL AUTO: PRESENT /HPF

## 2023-03-30 PROCEDURE — 87086 URINE CULTURE/COLONY COUNT: CPT

## 2023-03-30 PROCEDURE — 81001 URINALYSIS AUTO W/SCOPE: CPT

## 2023-03-30 PROCEDURE — 87186 SC STD MICRODIL/AGAR DIL: CPT

## 2023-03-30 PROCEDURE — 87077 CULTURE AEROBIC IDENTIFY: CPT

## 2023-04-03 ENCOUNTER — LAB ENCOUNTER (OUTPATIENT)
Dept: LAB | Age: 83
End: 2023-04-03
Attending: STUDENT IN AN ORGANIZED HEALTH CARE EDUCATION/TRAINING PROGRAM
Payer: MEDICARE

## 2023-04-03 ENCOUNTER — OFFICE VISIT (OUTPATIENT)
Dept: FAMILY MEDICINE CLINIC | Facility: CLINIC | Age: 83
End: 2023-04-03

## 2023-04-03 VITALS
WEIGHT: 200 LBS | HEART RATE: 71 BPM | DIASTOLIC BLOOD PRESSURE: 76 MMHG | OXYGEN SATURATION: 97 % | SYSTOLIC BLOOD PRESSURE: 138 MMHG | BODY MASS INDEX: 33 KG/M2 | TEMPERATURE: 97 F

## 2023-04-03 DIAGNOSIS — N39.0 RECURRENT UTI: ICD-10-CM

## 2023-04-03 DIAGNOSIS — I10 PRIMARY HYPERTENSION: ICD-10-CM

## 2023-04-03 DIAGNOSIS — R10.84 GENERALIZED ABDOMINAL PAIN: Primary | ICD-10-CM

## 2023-04-03 DIAGNOSIS — R10.84 GENERALIZED ABDOMINAL PAIN: ICD-10-CM

## 2023-04-03 LAB
ALBUMIN SERPL-MCNC: 3.4 G/DL (ref 3.4–5)
ALBUMIN/GLOB SERPL: 0.9 {RATIO} (ref 1–2)
ALP LIVER SERPL-CCNC: 166 U/L
ALT SERPL-CCNC: 18 U/L
ANION GAP SERPL CALC-SCNC: 6 MMOL/L (ref 0–18)
AST SERPL-CCNC: 22 U/L (ref 15–37)
BASOPHILS # BLD AUTO: 0.07 X10(3) UL (ref 0–0.2)
BASOPHILS NFR BLD AUTO: 0.6 %
BILIRUB SERPL-MCNC: 0.5 MG/DL (ref 0.1–2)
BUN BLD-MCNC: 14 MG/DL (ref 7–18)
BUN/CREAT SERPL: 10.5 (ref 10–20)
CALCIUM BLD-MCNC: 8.8 MG/DL (ref 8.5–10.1)
CHLORIDE SERPL-SCNC: 107 MMOL/L (ref 98–112)
CO2 SERPL-SCNC: 27 MMOL/L (ref 21–32)
CREAT BLD-MCNC: 1.33 MG/DL
DEPRECATED RDW RBC AUTO: 43.8 FL (ref 35.1–46.3)
EOSINOPHIL # BLD AUTO: 0.27 X10(3) UL (ref 0–0.7)
EOSINOPHIL NFR BLD AUTO: 2.4 %
ERYTHROCYTE [DISTWIDTH] IN BLOOD BY AUTOMATED COUNT: 14.4 % (ref 11–15)
FASTING STATUS PATIENT QL REPORTED: NO
GFR SERPLBLD BASED ON 1.73 SQ M-ARVRAT: 39 ML/MIN/1.73M2 (ref 60–?)
GLOBULIN PLAS-MCNC: 3.8 G/DL (ref 2.8–4.4)
GLUCOSE BLD-MCNC: 130 MG/DL (ref 70–99)
HCT VFR BLD AUTO: 43.8 %
HGB BLD-MCNC: 13.4 G/DL
IMM GRANULOCYTES # BLD AUTO: 0.04 X10(3) UL (ref 0–1)
IMM GRANULOCYTES NFR BLD: 0.4 %
LIPASE SERPL-CCNC: 28 U/L (ref 13–75)
LYMPHOCYTES # BLD AUTO: 3.02 X10(3) UL (ref 1–4)
LYMPHOCYTES NFR BLD AUTO: 26.7 %
MCH RBC QN AUTO: 25.5 PG (ref 26–34)
MCHC RBC AUTO-ENTMCNC: 30.6 G/DL (ref 31–37)
MCV RBC AUTO: 83.3 FL
MONOCYTES # BLD AUTO: 0.9 X10(3) UL (ref 0.1–1)
MONOCYTES NFR BLD AUTO: 8 %
NEUTROPHILS # BLD AUTO: 7.02 X10 (3) UL (ref 1.5–7.7)
NEUTROPHILS # BLD AUTO: 7.02 X10(3) UL (ref 1.5–7.7)
NEUTROPHILS NFR BLD AUTO: 61.9 %
OSMOLALITY SERPL CALC.SUM OF ELEC: 292 MOSM/KG (ref 275–295)
PLATELET # BLD AUTO: 536 10(3)UL (ref 150–450)
POTASSIUM SERPL-SCNC: 4.7 MMOL/L (ref 3.5–5.1)
PROT SERPL-MCNC: 7.2 G/DL (ref 6.4–8.2)
RBC # BLD AUTO: 5.26 X10(6)UL
SODIUM SERPL-SCNC: 140 MMOL/L (ref 136–145)
TSI SER-ACNC: 6.1 MIU/ML (ref 0.36–3.74)
WBC # BLD AUTO: 11.3 X10(3) UL (ref 4–11)

## 2023-04-03 PROCEDURE — 1126F AMNT PAIN NOTED NONE PRSNT: CPT | Performed by: STUDENT IN AN ORGANIZED HEALTH CARE EDUCATION/TRAINING PROGRAM

## 2023-04-03 PROCEDURE — 36415 COLL VENOUS BLD VENIPUNCTURE: CPT

## 2023-04-03 PROCEDURE — 83690 ASSAY OF LIPASE: CPT

## 2023-04-03 PROCEDURE — 80053 COMPREHEN METABOLIC PANEL: CPT

## 2023-04-03 PROCEDURE — 84439 ASSAY OF FREE THYROXINE: CPT

## 2023-04-03 PROCEDURE — 84443 ASSAY THYROID STIM HORMONE: CPT

## 2023-04-03 PROCEDURE — 99214 OFFICE O/P EST MOD 30 MIN: CPT | Performed by: STUDENT IN AN ORGANIZED HEALTH CARE EDUCATION/TRAINING PROGRAM

## 2023-04-03 PROCEDURE — 85025 COMPLETE CBC W/AUTO DIFF WBC: CPT

## 2023-04-03 RX ORDER — CIPROFLOXACIN 250 MG/1
250 TABLET, FILM COATED ORAL 2 TIMES DAILY
Qty: 10 TABLET | Refills: 0 | Status: SHIPPED | OUTPATIENT
Start: 2023-04-03 | End: 2023-04-08

## 2023-04-04 ENCOUNTER — LAB ENCOUNTER (OUTPATIENT)
Dept: LAB | Age: 83
End: 2023-04-04
Attending: STUDENT IN AN ORGANIZED HEALTH CARE EDUCATION/TRAINING PROGRAM
Payer: MEDICARE

## 2023-04-04 DIAGNOSIS — R10.84 GENERALIZED ABDOMINAL PAIN: ICD-10-CM

## 2023-04-04 LAB — T4 FREE SERPL-MCNC: 1.2 NG/DL (ref 0.8–1.7)

## 2023-04-04 PROCEDURE — 87338 HPYLORI STOOL AG IA: CPT

## 2023-04-07 ENCOUNTER — APPOINTMENT (OUTPATIENT)
Dept: CT IMAGING | Facility: HOSPITAL | Age: 83
End: 2023-04-07
Attending: EMERGENCY MEDICINE
Payer: MEDICARE

## 2023-04-07 ENCOUNTER — TELEPHONE (OUTPATIENT)
Dept: FAMILY MEDICINE CLINIC | Facility: CLINIC | Age: 83
End: 2023-04-07

## 2023-04-07 ENCOUNTER — HOSPITAL ENCOUNTER (EMERGENCY)
Facility: HOSPITAL | Age: 83
Discharge: HOME | End: 2023-04-07
Attending: EMERGENCY MEDICINE
Payer: MEDICARE

## 2023-04-07 VITALS
BODY MASS INDEX: 33.32 KG/M2 | WEIGHT: 200 LBS | HEART RATE: 60 BPM | HEIGHT: 65 IN | OXYGEN SATURATION: 95 % | TEMPERATURE: 98 F | RESPIRATION RATE: 19 BRPM | DIASTOLIC BLOOD PRESSURE: 64 MMHG | SYSTOLIC BLOOD PRESSURE: 128 MMHG

## 2023-04-07 DIAGNOSIS — R53.83 OTHER FATIGUE: Primary | ICD-10-CM

## 2023-04-07 LAB
ALBUMIN SERPL-MCNC: 3.3 G/DL (ref 3.4–5)
ALBUMIN/GLOB SERPL: 0.9 {RATIO} (ref 1–2)
ALP LIVER SERPL-CCNC: 161 U/L
ALT SERPL-CCNC: 17 U/L
ANION GAP SERPL CALC-SCNC: 9 MMOL/L (ref 0–18)
AST SERPL-CCNC: 19 U/L (ref 15–37)
ATRIAL RATE: 59 BPM
BASOPHILS # BLD AUTO: 0.06 X10(3) UL (ref 0–0.2)
BASOPHILS NFR BLD AUTO: 0.5 %
BILIRUB SERPL-MCNC: 0.6 MG/DL (ref 0.1–2)
BILIRUB UR QL: NEGATIVE
BUN BLD-MCNC: 18 MG/DL (ref 7–18)
BUN/CREAT SERPL: 14.9 (ref 10–20)
CALCIUM BLD-MCNC: 8.8 MG/DL (ref 8.5–10.1)
CHLORIDE SERPL-SCNC: 107 MMOL/L (ref 98–112)
CK SERPL-CCNC: 56 U/L
CO2 SERPL-SCNC: 20 MMOL/L (ref 21–32)
COLOR UR: YELLOW
CREAT BLD-MCNC: 1.21 MG/DL
DEPRECATED RDW RBC AUTO: 43.1 FL (ref 35.1–46.3)
EOSINOPHIL # BLD AUTO: 0.2 X10(3) UL (ref 0–0.7)
EOSINOPHIL NFR BLD AUTO: 1.6 %
ERYTHROCYTE [DISTWIDTH] IN BLOOD BY AUTOMATED COUNT: 14.4 % (ref 11–15)
FLUAV + FLUBV RNA SPEC NAA+PROBE: NEGATIVE
FLUAV + FLUBV RNA SPEC NAA+PROBE: NEGATIVE
GFR SERPLBLD BASED ON 1.73 SQ M-ARVRAT: 44 ML/MIN/1.73M2 (ref 60–?)
GLOBULIN PLAS-MCNC: 3.7 G/DL (ref 2.8–4.4)
GLUCOSE BLD-MCNC: 128 MG/DL (ref 70–99)
GLUCOSE UR-MCNC: NORMAL MG/DL
H PYLORI AG STL QL IA: NEGATIVE
HCT VFR BLD AUTO: 40.1 %
HGB BLD-MCNC: 12.5 G/DL
HGB UR QL STRIP.AUTO: NEGATIVE
HYALINE CASTS #/AREA URNS AUTO: PRESENT /LPF
IMM GRANULOCYTES # BLD AUTO: 0.04 X10(3) UL (ref 0–1)
IMM GRANULOCYTES NFR BLD: 0.3 %
KETONES UR-MCNC: NEGATIVE MG/DL
LEUKOCYTE ESTERASE UR QL STRIP.AUTO: NEGATIVE
LIPASE SERPL-CCNC: 34 U/L (ref 13–75)
LYMPHOCYTES # BLD AUTO: 2.37 X10(3) UL (ref 1–4)
LYMPHOCYTES NFR BLD AUTO: 19.4 %
MCH RBC QN AUTO: 25.6 PG (ref 26–34)
MCHC RBC AUTO-ENTMCNC: 31.2 G/DL (ref 31–37)
MCV RBC AUTO: 82 FL
MONOCYTES # BLD AUTO: 0.93 X10(3) UL (ref 0.1–1)
MONOCYTES NFR BLD AUTO: 7.6 %
NEUTROPHILS # BLD AUTO: 8.64 X10 (3) UL (ref 1.5–7.7)
NEUTROPHILS # BLD AUTO: 8.64 X10(3) UL (ref 1.5–7.7)
NEUTROPHILS NFR BLD AUTO: 70.6 %
NITRITE UR QL STRIP.AUTO: NEGATIVE
OSMOLALITY SERPL CALC.SUM OF ELEC: 286 MOSM/KG (ref 275–295)
P AXIS: 80 DEGREES
P-R INTERVAL: 154 MS
PH UR: 5 [PH] (ref 5–8)
PLATELET # BLD AUTO: 455 10(3)UL (ref 150–450)
POTASSIUM SERPL-SCNC: 4.1 MMOL/L (ref 3.5–5.1)
PROT SERPL-MCNC: 7 G/DL (ref 6.4–8.2)
PROT UR-MCNC: 30 MG/DL
Q-T INTERVAL: 378 MS
QRS DURATION: 74 MS
QTC CALCULATION (BEZET): 374 MS
R AXIS: 11 DEGREES
RBC # BLD AUTO: 4.89 X10(6)UL
RSV RNA SPEC NAA+PROBE: NEGATIVE
SARS-COV-2 RNA RESP QL NAA+PROBE: NOT DETECTED
SODIUM SERPL-SCNC: 136 MMOL/L (ref 136–145)
SP GR UR STRIP: 1.02 (ref 1–1.03)
T AXIS: -18 DEGREES
T4 FREE SERPL-MCNC: 1.2 NG/DL (ref 0.8–1.7)
TSI SER-ACNC: 4.43 MIU/ML (ref 0.36–3.74)
UROBILINOGEN UR STRIP-ACNC: NORMAL
VENTRICULAR RATE: 59 BPM
WBC # BLD AUTO: 12.2 X10(3) UL (ref 4–11)

## 2023-04-07 PROCEDURE — 80053 COMPREHEN METABOLIC PANEL: CPT | Performed by: EMERGENCY MEDICINE

## 2023-04-07 PROCEDURE — 83690 ASSAY OF LIPASE: CPT | Performed by: EMERGENCY MEDICINE

## 2023-04-07 PROCEDURE — 74177 CT ABD & PELVIS W/CONTRAST: CPT | Performed by: EMERGENCY MEDICINE

## 2023-04-07 PROCEDURE — 93005 ELECTROCARDIOGRAM TRACING: CPT

## 2023-04-07 PROCEDURE — 0241U SARS-COV-2/FLU A AND B/RSV BY PCR (GENEXPERT): CPT | Performed by: EMERGENCY MEDICINE

## 2023-04-07 PROCEDURE — 93010 ELECTROCARDIOGRAM REPORT: CPT

## 2023-04-07 PROCEDURE — 84443 ASSAY THYROID STIM HORMONE: CPT | Performed by: EMERGENCY MEDICINE

## 2023-04-07 PROCEDURE — 96360 HYDRATION IV INFUSION INIT: CPT

## 2023-04-07 PROCEDURE — 84439 ASSAY OF FREE THYROXINE: CPT | Performed by: EMERGENCY MEDICINE

## 2023-04-07 PROCEDURE — 81001 URINALYSIS AUTO W/SCOPE: CPT | Performed by: EMERGENCY MEDICINE

## 2023-04-07 PROCEDURE — 82550 ASSAY OF CK (CPK): CPT | Performed by: EMERGENCY MEDICINE

## 2023-04-07 PROCEDURE — 99285 EMERGENCY DEPT VISIT HI MDM: CPT

## 2023-04-07 PROCEDURE — 96361 HYDRATE IV INFUSION ADD-ON: CPT

## 2023-04-07 PROCEDURE — 85025 COMPLETE CBC W/AUTO DIFF WBC: CPT | Performed by: EMERGENCY MEDICINE

## 2023-04-07 NOTE — ED INITIAL ASSESSMENT (HPI)
Pt presents to ER with c/o \"fatigue\" and bilateral calf pain starting this morning. Pt reports fatigue x 5 weeks. Pt also concerned she has a UTI.   Family states pt had a stool sample done earlier this week which has not been resulted and has an abdominal CT scheduled for Monday of next week

## 2023-04-07 NOTE — TELEPHONE ENCOUNTER
Patient's daughter Cecilia Iglesiasor called (on EBONI), verified patient's Name and . She states patient was seen on 4/3 for abdominal pain and urinary symptoms. CT abdomen was ordered, unable to get it done sooner than . Labs completed and was prescribed an antibiotic; symptoms resolved. However this morning, patient is shaky with no appetite. She is also complaining of bilateral calf pain (started with left calf last night). She has had burning sensation in the chest as well for the past few days per daughter. Advised to bring patient to ED for prompt evaluation and treatment. Daughter verbalized understanding and agreed with plan of care. Dr. Jeremie Reilly.     Routed to RN Triage - ED Followup

## 2023-04-10 ENCOUNTER — OFFICE VISIT (OUTPATIENT)
Dept: FAMILY MEDICINE CLINIC | Facility: CLINIC | Age: 83
End: 2023-04-10

## 2023-04-10 VITALS
TEMPERATURE: 97 F | HEIGHT: 65 IN | OXYGEN SATURATION: 96 % | DIASTOLIC BLOOD PRESSURE: 66 MMHG | HEART RATE: 72 BPM | BODY MASS INDEX: 33.32 KG/M2 | WEIGHT: 200 LBS | SYSTOLIC BLOOD PRESSURE: 126 MMHG

## 2023-04-10 DIAGNOSIS — N39.0 RECURRENT UTI: ICD-10-CM

## 2023-04-10 DIAGNOSIS — R10.84 GENERALIZED ABDOMINAL PAIN: Primary | ICD-10-CM

## 2023-04-10 PROBLEM — I46.9 CARDIAC ARREST (HCC): Status: ACTIVE | Noted: 2019-12-02

## 2023-04-10 PROBLEM — I10 ESSENTIAL HYPERTENSION: Status: ACTIVE | Noted: 2019-12-02

## 2023-04-10 PROBLEM — I25.10 ATHEROSCLEROSIS OF CORONARY ARTERY: Status: ACTIVE | Noted: 2019-12-02

## 2023-04-10 PROBLEM — E78.5 DYSLIPIDEMIA: Status: ACTIVE | Noted: 2019-12-03

## 2023-04-10 PROCEDURE — 1126F AMNT PAIN NOTED NONE PRSNT: CPT | Performed by: STUDENT IN AN ORGANIZED HEALTH CARE EDUCATION/TRAINING PROGRAM

## 2023-04-10 PROCEDURE — 99213 OFFICE O/P EST LOW 20 MIN: CPT | Performed by: STUDENT IN AN ORGANIZED HEALTH CARE EDUCATION/TRAINING PROGRAM

## 2023-04-13 ENCOUNTER — OFFICE VISIT (OUTPATIENT)
Dept: DERMATOLOGY CLINIC | Facility: CLINIC | Age: 83
End: 2023-04-13

## 2023-04-13 DIAGNOSIS — L30.9 DERMATITIS: ICD-10-CM

## 2023-04-13 DIAGNOSIS — D22.9 MULTIPLE MELANOCYTIC NEVI: ICD-10-CM

## 2023-04-13 DIAGNOSIS — L57.0 ACTINIC KERATOSIS: ICD-10-CM

## 2023-04-13 DIAGNOSIS — D48.5 NEOPLASM OF UNCERTAIN BEHAVIOR OF SKIN: Primary | ICD-10-CM

## 2023-04-13 DIAGNOSIS — L82.1 SEBORRHEIC KERATOSES: ICD-10-CM

## 2023-04-13 DIAGNOSIS — D23.9 BENIGN NEOPLASM OF SKIN, UNSPECIFIED LOCATION: ICD-10-CM

## 2023-04-13 DIAGNOSIS — L81.4 SOLAR LENTIGO: ICD-10-CM

## 2023-04-13 DIAGNOSIS — Z85.828 PERSONAL HISTORY OF SKIN CANCER: ICD-10-CM

## 2023-04-13 PROCEDURE — 88305 TISSUE EXAM BY PATHOLOGIST: CPT | Performed by: DERMATOLOGY

## 2023-04-18 ENCOUNTER — TELEPHONE (OUTPATIENT)
Dept: DERMATOLOGY CLINIC | Facility: CLINIC | Age: 83
End: 2023-04-18

## 2023-04-18 NOTE — PROGRESS NOTES
The pathology report from last visit showed   , right dorsal hand, shave biopsy:  -Invasive, well-differentiated squamous cell carcinoma.  -The tumor involves the deep and peripheral margins. Discussed at [facility] may need additional surgery. This had been quite tender, discussed possible excision with Dr. Calderon Tinoco if this was [facility]. Please log in test results. Please call patient and inform of results and recommendations.  (please add to history). Pt to  rtc  f/u appt scheduled 10/23  or prn.

## 2023-04-18 NOTE — TELEPHONE ENCOUNTER
Patients daughter Beryl Grigsby called    Asking to discuss with Rn test results and questions pertaining to the biopsy.  Please call

## 2023-04-18 NOTE — PROGRESS NOTES
Logged in path book and pmh. Pt's daughter informed of pathology results and KMT's recommendations for treatment.

## 2023-04-23 NOTE — PROGRESS NOTES
Operative Report                     Shave/  Tangential biopsy     Clinical diagnosis:    Size of lesion:    Location:pt with tender growing lesion on hand  Spec 1 Description >>>>>: right dorsal hand  Spec 1 Comment: r/o SCC keratotic plaque 1.5cm on erythematous base, tender growing    Procedure: With patient in appropriate position the skin of the above was scrubbed with alcohol. Anesthesia was obtained with 1% Xylocaine with epinephrine. The skin surrounding the lesion was placed under tension and the lesion was incised using a #15 scalpel blade. The specimen was sent for histopathologic exam.    Hemostasis was obtained with electrocautery/aluminum chloride. Estimated blood loss less than 2 cc. Biopsy dressed with Polysporin, bandage. Pressure dressing:   No    Complications: None    Written instructions given and reviewed with patient    Await pathology    Contact information reviewed.     Procedural physician:  Lea Alatorre MD

## 2023-05-09 ENCOUNTER — OFFICE VISIT (OUTPATIENT)
Dept: PODIATRY CLINIC | Facility: CLINIC | Age: 83
End: 2023-05-09

## 2023-05-09 DIAGNOSIS — B35.1 ONYCHOMYCOSIS: ICD-10-CM

## 2023-05-09 DIAGNOSIS — M21.611 BILATERAL BUNIONS: ICD-10-CM

## 2023-05-09 DIAGNOSIS — M21.612 BILATERAL BUNIONS: ICD-10-CM

## 2023-05-09 DIAGNOSIS — G60.9 IDIOPATHIC POLYNEUROPATHY: Primary | ICD-10-CM

## 2023-05-09 DIAGNOSIS — L84 PRE-ULCERATIVE CALLUSES: ICD-10-CM

## 2023-05-09 PROCEDURE — 99213 OFFICE O/P EST LOW 20 MIN: CPT | Performed by: PODIATRIST

## 2023-05-09 PROCEDURE — 1126F AMNT PAIN NOTED NONE PRSNT: CPT | Performed by: PODIATRIST

## 2023-05-12 ENCOUNTER — LAB REQUISITION (OUTPATIENT)
Dept: LAB | Facility: HOSPITAL | Age: 83
End: 2023-05-12
Payer: MEDICARE

## 2023-05-12 DIAGNOSIS — C44.622 SQUAMOUS CELL CARCINOMA OF SKIN OF RIGHT UPPER LIMB, INCLUDING SHOULDER: ICD-10-CM

## 2023-05-12 PROCEDURE — 88305 TISSUE EXAM BY PATHOLOGIST: CPT | Performed by: PLASTIC SURGERY

## 2023-05-16 ENCOUNTER — MED REC SCAN ONLY (OUTPATIENT)
Dept: DERMATOLOGY CLINIC | Facility: CLINIC | Age: 83
End: 2023-05-16

## 2023-06-05 ENCOUNTER — HOSPITAL ENCOUNTER (OUTPATIENT)
Dept: MRI IMAGING | Facility: HOSPITAL | Age: 83
Discharge: HOME | End: 2023-06-05
Attending: INTERNAL MEDICINE
Payer: MEDICARE

## 2023-06-05 DIAGNOSIS — R93.5 ABNORMAL CT OF THE ABDOMEN: ICD-10-CM

## 2023-06-05 PROCEDURE — A9575 INJ GADOTERATE MEGLUMI 0.1ML: HCPCS | Performed by: INTERNAL MEDICINE

## 2023-06-05 PROCEDURE — 74183 MRI ABD W/O CNTR FLWD CNTR: CPT | Performed by: INTERNAL MEDICINE

## 2023-06-05 PROCEDURE — 76376 3D RENDER W/INTRP POSTPROCES: CPT | Performed by: INTERNAL MEDICINE

## 2023-06-05 RX ORDER — GADOTERATE MEGLUMINE 376.9 MG/ML
20 INJECTION INTRAVENOUS
Status: COMPLETED | OUTPATIENT
Start: 2023-06-05 | End: 2023-06-05

## 2023-06-05 RX ADMIN — GADOTERATE MEGLUMINE 19 ML: 376.9 INJECTION INTRAVENOUS at 08:53:00

## 2023-07-18 ENCOUNTER — OFFICE VISIT (OUTPATIENT)
Dept: PODIATRY CLINIC | Facility: CLINIC | Age: 83
End: 2023-07-18

## 2023-07-18 DIAGNOSIS — G60.9 IDIOPATHIC POLYNEUROPATHY: Primary | ICD-10-CM

## 2023-07-18 DIAGNOSIS — M21.611 BILATERAL BUNIONS: ICD-10-CM

## 2023-07-18 DIAGNOSIS — B35.1 ONYCHOMYCOSIS: ICD-10-CM

## 2023-07-18 DIAGNOSIS — L84 PRE-ULCERATIVE CALLUSES: ICD-10-CM

## 2023-07-18 DIAGNOSIS — M21.612 BILATERAL BUNIONS: ICD-10-CM

## 2023-07-18 PROCEDURE — 99213 OFFICE O/P EST LOW 20 MIN: CPT | Performed by: PODIATRIST

## 2023-07-18 PROCEDURE — 1126F AMNT PAIN NOTED NONE PRSNT: CPT | Performed by: PODIATRIST

## 2023-07-18 NOTE — PROGRESS NOTES
[facility], [facility] Podiatry  Progress Note    Maria Dallas is a 78year old female. Patient presents with:  Toenail Care: 10 weeks f/u - has a bothersome callus on bilateral feet - no other c/o         HPI:     This is a pleasant female with CAD on plavix and neuropathy. She complains of long thick toenails and painful calluses which she is unable to trim herself. She does use a cane or walker for stability. She denies any burning pain to her feet. Allergies: Bactrim Ds, Codeine, and Macrobid [Nitrofurantoin]   Current Outpatient Medications   Medication Sig Dispense Refill    fluticasone propionate 50 MCG/ACT Nasal Suspension 1 spray by Nasal route daily. One spray per each nostril daily. 1 each 0    sucralfate 1 g Oral Tab Take 1 tablet (1 g total) by mouth 3 (three) times daily before meals. pantoprazole 40 MG Oral Tab EC Take 1 tablet (40 mg total) by mouth every morning before breakfast. 90 tablet 3    amLODIPine besylate 10 MG Oral Tab Take 1 tablet (10 mg total) by mouth daily. ezetimibe 10 MG Oral Tab Take 1 tablet (10 mg total) by mouth nightly. Calcium Carbonate Antacid 500 MG Oral Chew Tab Chew 1 tablet (500 mg total) by mouth 3 (three) times daily as needed. 0    Atorvastatin Calcium 40 MG Oral Tab Take 1 tablet (40 mg total) by mouth daily. Clopidogrel Bisulfate 75 MG Oral Tab Take 1 tablet (75 mg total) by mouth daily. Metoprolol Succinate  MG Oral Tablet 24 Hr Take 1 tablet (100 mg total) by mouth daily. aspirin 81 MG Oral Tab EC Take 1 tablet (81 mg total) by mouth daily.         Past Medical History:   Diagnosis Date    Actinic keratoses 2013    left jaw    Anesthesia complication     Cataract     Coronary atherosclerosis     Essential hypertension     GERD (gastroesophageal reflux disease)     High blood pressure     High cholesterol     History of stomach ulcers     Osteoarthritis     PONV (postoperative nausea and vomiting)     SCC (squamous cell carcinoma) 2023    Right dorsal hand    Squamous carcinoma     right cheek    Squamous cell carcinoma     lateral left cheek      Past Surgical History:   Procedure Laterality Date    ANGIOPLASTY (CORONARY)      multiple angioplasties    ANGIOPLASTY (CORONARY)   and     CATH BARE METAL STENT (BMS)      CATH DRUG ELUTING STENT      TOTAL KNEE REPLACEMENT        Family History   Problem Relation Age of Onset    Stroke Father         CVA    Lung Disorder Father         lung disease    Heart Disease Father     Breast Cancer Mother 77    Hypertension Mother     Cancer Mother         Dx age 65- at 79    Arthritis Daughter         rheumatoid    Other (benign brain lesion) Son       Social History    Socioeconomic History      Marital status:     Tobacco Use      Smoking status: Never      Smokeless tobacco: Never    Vaping Use      Vaping Use: Never used    Substance and Sexual Activity      Alcohol use: No        Alcohol/week: 0.0 standard drinks of alcohol      Drug use: No      Sexual activity: Not Currently    Other Topics      Concerns:        Caffeine Concern: Yes        Pt has a pacemaker: No        Pt has a defibrillator: No        Reaction to local anesthetic: No          REVIEW OF SYSTEMS:   Denies nausea, fever, chills  No calf pain  No other muscle or joint aches  Denies chest pain or shortness of breath. EXAM:   There were no vitals taken for this visit. Constitutional:   Patient in no apparent distress. Well kept. Of normal body habitus. Alert and oriented to person, place, and time. Vascular Examination:  DP pulse is 2/4  PT pulse is 2/4  Capillary refill is immediate  Temperature warm proximally to warm distally bilateral  Integumentary Examination:   The patient's nails appear incurvated, thickened, elongated, dystrophic, discolored with subungual debris 1-5 right, 1-5  left nails. Skin is of diminished texture and decreased turgor.     Pre ulcerative callus left sub 1st met head    Neurological Examination:  Monofilament (10-g) sensation is 3/5 to right and 3/5 to left. Sharp/dull is present to right and is present to left. Parasthesias absent. Musculoskeletal Examination:  Muscle Strength is 5/5. Bunions Deformity present  bilateral.  Hammer digit deformity present digits 2-5  bilateral.            LABS & IMAGING:     Lab Results   Component Value Date     (H) 04/07/2023    BUN 18 04/07/2023    CREATSERUM 1.21 (H) 04/07/2023    BUNCREA 14.9 04/07/2023    ANIONGAP 9 04/07/2023    GFRAA 54 (L) 10/23/2021    GFRNAA 47 (L) 10/23/2021    CA 8.8 04/07/2023     04/07/2023    K 4.1 04/07/2023     04/07/2023    CO2 20.0 (L) 04/07/2023    OSMOCALC 286 04/07/2023        No results found for: EAG, A1C     No results found. ASSESSMENT AND PLAN:   Diagnoses and all orders for this visit:    Idiopathic polyneuropathy    Pre-ulcerative calluses    Onychomycosis    Bilateral bunions          Plan:     Discussed etiology and symptoms of neuropathy/neuritis  Discussed treatment options such as medication, vitamins, physical therapy. Discussed risk, indications, and benefits of medication as well as all side effects of medication. Discussed potential for symptoms to be related to spinal pathology and discussed the nature of radiculopathy and impact of the sciatic nerve as well as peripheral nerves to the lower extremity. Evaluated the patient. Discussed treatment options with the patient. Discussed with patient proper care and hygiene for their feet. Patient tolerated procedures well, without incident. Instrumentation used includes nail nippers and electric  where appropriate. Procedure: (08913 Debridement of toenails 6-10) Surgically debrided and mechanically reduced 6 or more toenails. Hemmorhage occurred none. Nails that were debrided appeared dystrophic and caused the patient pain in shoe gear. Nails 5 Left & 5 Right.       Evaluated patient. Discussed treatment options with patient. Discussed proper hygiene and care for feet as well as use of emollient creams (ie Urea based creams)  Answered all patient questions. Discussed offloading hyperkeratotic lesions with proper shoe gear, offloading pads, and insoles. Procedures: (CPT 96774 Paring or cutting of benign hyperkeratotic lesion)  One lesion pared utilizing #15 blade left foot without incident. Prescribed custom multi density orthotics to offload left sub 1st met head pre ulcerative callus, she did not get      RTC 65 days for high risk foot care and ensure no wound to left sub 1st met head. No follow-ups on file.     Aaliyah Pratt DPM  7/18/23

## 2023-07-31 ENCOUNTER — DOCUMENTATION ONLY (OUTPATIENT)
Dept: FAMILY MEDICINE CLINIC | Facility: CLINIC | Age: 83
End: 2023-07-31

## 2023-07-31 ENCOUNTER — MED REC SCAN ONLY (OUTPATIENT)
Dept: FAMILY MEDICINE CLINIC | Facility: CLINIC | Age: 83
End: 2023-07-31

## 2023-08-03 ENCOUNTER — HOSPITAL ENCOUNTER (OUTPATIENT)
Facility: HOSPITAL | Age: 83
Setting detail: HOSPITAL OUTPATIENT SURGERY
Discharge: HOME | End: 2023-08-03
Attending: INTERNAL MEDICINE | Admitting: INTERNAL MEDICINE
Payer: MEDICARE

## 2023-08-03 ENCOUNTER — ANESTHESIA (OUTPATIENT)
Dept: ENDOSCOPY | Facility: HOSPITAL | Age: 83
End: 2023-08-03
Payer: MEDICARE

## 2023-08-03 ENCOUNTER — ANESTHESIA EVENT (OUTPATIENT)
Dept: ENDOSCOPY | Facility: HOSPITAL | Age: 83
End: 2023-08-03
Payer: MEDICARE

## 2023-08-03 VITALS
TEMPERATURE: 98 F | WEIGHT: 195 LBS | BODY MASS INDEX: 32.49 KG/M2 | DIASTOLIC BLOOD PRESSURE: 65 MMHG | SYSTOLIC BLOOD PRESSURE: 136 MMHG | HEIGHT: 65 IN | OXYGEN SATURATION: 95 % | HEART RATE: 58 BPM | RESPIRATION RATE: 19 BRPM

## 2023-08-03 PROCEDURE — BF47ZZZ ULTRASONOGRAPHY OF PANCREAS: ICD-10-PCS | Performed by: INTERNAL MEDICINE

## 2023-08-03 PROCEDURE — 0DJ08ZZ INSPECTION OF UPPER INTESTINAL TRACT, VIA NATURAL OR ARTIFICIAL OPENING ENDOSCOPIC: ICD-10-PCS | Performed by: INTERNAL MEDICINE

## 2023-08-03 RX ORDER — SODIUM CHLORIDE, SODIUM LACTATE, POTASSIUM CHLORIDE, CALCIUM CHLORIDE 600; 310; 30; 20 MG/100ML; MG/100ML; MG/100ML; MG/100ML
INJECTION, SOLUTION INTRAVENOUS CONTINUOUS
Status: DISCONTINUED | OUTPATIENT
Start: 2023-08-03 | End: 2023-08-03

## 2023-08-03 RX ORDER — LEVOFLOXACIN 5 MG/ML
500 INJECTION, SOLUTION INTRAVENOUS ONCE
Status: DISCONTINUED | OUTPATIENT
Start: 2023-08-03 | End: 2023-08-03

## 2023-08-03 RX ORDER — LIDOCAINE HYDROCHLORIDE 10 MG/ML
INJECTION, SOLUTION EPIDURAL; INFILTRATION; INTRACAUDAL; PERINEURAL AS NEEDED
Status: DISCONTINUED | OUTPATIENT
Start: 2023-08-03 | End: 2023-08-03 | Stop reason: SURG

## 2023-08-03 RX ADMIN — LIDOCAINE HYDROCHLORIDE 50 MG: 10 INJECTION, SOLUTION EPIDURAL; INFILTRATION; INTRACAUDAL; PERINEURAL at 14:36:00

## 2023-08-03 NOTE — DISCHARGE INSTRUCTIONS
Home Care Instructions for Gastroscopy with Sedation    Diet:  - Resume your regular diet as tolerated unless otherwise instructed. - Start with light meals to minimize bloating.  - Do not drink alcohol today. Medication:  - If you have questions about resuming your normal medications, please contact your Primary Care Physician. You can restart your blood thinner tonight     Activities:  - Take it easy today. Do not return to work today. - Do not drive today. - Do not operate any machinery today (including kitchen equipment). Gastroscopy:  - You may have a sore throat for 2-3 days following the exam. This is normal. Gargling with warm salt water (1/2 tsp salt to 1 glass warm water) or using throat lozenges will help. - If you experience any sharp pain in your neck, abdomen or chest, vomiting of blood, oral temperature over 100 degrees Fahrenheit, light-headedness or dizziness, or any other problems, contact your doctor. **If unable to reach your doctor, please go to the [facility] Emergency Room**    - Your referring physician will receive a full report of your examination.  - If you do not hear from your doctor's office within two weeks of your biopsy, please call them for your results. You may be able to see your laboratory results in [facility] between 4 and 7 business days. In some cases, your physician may not have viewed the results before they are released to [address] If you have questions regarding your results contact the physician who ordered the test/exam by phone or via [address] by choosing \"Ask a Medical Question. \"

## 2023-08-03 NOTE — OPERATIVE REPORT
[facility] OPERATIVE REPORT   PATIENT NAME: Altagracia Sherman  MRN: [id number]  DATE OF OPERATION: 8/3/2023  PREOPERATIVE DIAGNOSIS: abnormal CT scan; mildly dilated pancreatic duct   POSTOPERATIVE DIAGNOSIS:    1. EGD    - large hiatal hernia   2. EUS    - pancreatic cysts without worrisome features     - body 2.7mm     - tail 5.4mm     - head 10.1mm    - normal pancreas without chronic pancreatitis    - focal fatty liver    - duodenal diverticulum    - portahepatis and subcarinal adenopathy   PROCEDURE PERFORMED: upper endoscopy/ ENDOSCOPIC ultrasound  SEDATION MEDICATIONS: MAC  PREOPERATIVE MEDICATIONS:   PREPROCEDURE ASSESSMENT: The indication for this procedure is to assess for cysts. The patient was identified by myself and nursing staff in the exam room. Informed consent was obtained. The patient was seen in clinic and a full H&P was obtained. On brief physical examination, airway is patent. Chest is clear. Heart has regular rate and rhythm. Abdomen is soft, nontender with good bowel sounds. A medication list was taken by nursing today and reviewed by myself. The patient is an ASA grade 2. PROCEDURE NOTE: The procedure was completed without difficulty. The patient tolerated the procedure well. Upper endoscopy (EGD): The endoscope was inserted through the mouth and advanced to the level of the duodenum, 3rd portion. Visualized portion of the esophagus, stomach including antrum, body, fundus and cardiac, and duodenum were normal.  Large hiatal hernia was noted. Endoscopic ultrasound (EUS):  Endoscopic ultrasound was performed using the linear echoendoscope. Images were obtained. LIVER: Left lobe of the liver was visualized and no mass or lesions seen. No intrahepatic duct dilation was noted. Portal vein was noted to come out of the liver and the portal confluence was seen and pancreas was noted.    PANCREAS:  Pancreatic neck, body, and tail were interrogated from the gastric body while the neck, head and uncinate were examined from the 1st and 2nd duodenum. PD  - body: 1.1 mm  - head: 2.9 mm  Pancreas divisum: no  Chronic pancreatitis changes:  no  Neoplasm: no   Cysts:  yes   - body: 2.7 mm  - tail: 5.4 mm  - head: 10.1 mm  Biliary Tree:  - mid: 6.6 mm  - stones: no  GALLBLADDER: not visualized and was normal without stones or sludge  CELIAC AXIS: visualized without lymphadenopathy; portal hepatis adenopathy 2cm was noted. L ADRENAL GLAND:  visualized   L KIDNEY:  visualized   MEDIASTINUM:  visualized with subcarinal lymphadenopathy  Scope was withdrawn from the patient and patient tolerated the procedure well. FINDINGS   Cysts in pancreas without mural nodules, mass or dilated pancreatic duct  RECOMMENDATIONS: given her age no follow up is needed  DISCHARGE:  On discharge, the patient was given an after-visit summary detailing the procedure, findings, followup plans, and an updated medication list.     Thank you very much for the consultation. I really appreciate it.     Jesse Gomez MD

## 2023-09-08 ENCOUNTER — TELEPHONE (OUTPATIENT)
Dept: FAMILY MEDICINE CLINIC | Facility: CLINIC | Age: 83
End: 2023-09-08

## 2023-09-23 ENCOUNTER — LAB ENCOUNTER (OUTPATIENT)
Dept: LAB | Age: 83
End: 2023-09-23
Attending: STUDENT IN AN ORGANIZED HEALTH CARE EDUCATION/TRAINING PROGRAM
Payer: MEDICARE

## 2023-09-23 DIAGNOSIS — N39.0 RECURRENT UTI: ICD-10-CM

## 2023-09-23 LAB
BILIRUB UR QL: NEGATIVE
COLOR UR: YELLOW
GLUCOSE UR-MCNC: NORMAL MG/DL
HGB UR QL STRIP.AUTO: NEGATIVE
HYALINE CASTS #/AREA URNS AUTO: PRESENT /LPF
KETONES UR-MCNC: NEGATIVE MG/DL
LEUKOCYTE ESTERASE UR QL STRIP.AUTO: 500
NITRITE UR QL STRIP.AUTO: NEGATIVE
PH UR: 5 [PH] (ref 5–8)
PROT UR-MCNC: 20 MG/DL
SP GR UR STRIP: 1.02 (ref 1–1.03)
UROBILINOGEN UR STRIP-ACNC: NORMAL
WBC #/AREA URNS AUTO: >50 /HPF
WBC CLUMPS UR QL AUTO: PRESENT /HPF

## 2023-09-23 PROCEDURE — 87186 SC STD MICRODIL/AGAR DIL: CPT

## 2023-09-23 PROCEDURE — 81001 URINALYSIS AUTO W/SCOPE: CPT

## 2023-09-23 PROCEDURE — 87088 URINE BACTERIA CULTURE: CPT

## 2023-09-23 PROCEDURE — 87086 URINE CULTURE/COLONY COUNT: CPT

## 2023-09-26 ENCOUNTER — OFFICE VISIT (OUTPATIENT)
Dept: PODIATRY CLINIC | Facility: CLINIC | Age: 83
End: 2023-09-26

## 2023-09-26 DIAGNOSIS — M21.611 BILATERAL BUNIONS: ICD-10-CM

## 2023-09-26 DIAGNOSIS — B35.1 ONYCHOMYCOSIS: ICD-10-CM

## 2023-09-26 DIAGNOSIS — G60.9 IDIOPATHIC POLYNEUROPATHY: Primary | ICD-10-CM

## 2023-09-26 DIAGNOSIS — L84 PRE-ULCERATIVE CALLUSES: ICD-10-CM

## 2023-09-26 DIAGNOSIS — M21.612 BILATERAL BUNIONS: ICD-10-CM

## 2023-09-26 PROCEDURE — 99213 OFFICE O/P EST LOW 20 MIN: CPT | Performed by: PODIATRIST

## 2023-09-26 PROCEDURE — 1126F AMNT PAIN NOTED NONE PRSNT: CPT | Performed by: PODIATRIST

## 2023-09-26 NOTE — PROGRESS NOTES
[facility] Podiatry  Progress Note    Rosana Villanueva is a 78year old female. Patient presents with:  Toenail Care: 10 week f/u toenail care and callus check - left foot callus with intermittent pain - no pain at rest, 3-5/10 when ambulates - no numbness or tingling        HPI:     This is a pleasant female with CAD on plavix and neuropathy. She complains of long thick toenails and painful calluses which she is unable to trim herself. She does use a cane or walker for stability. She denies any burning pain to her feet. Allergies: Bactrim Ds, Codeine, and Macrobid [Nitrofurantoin]   Current Outpatient Medications   Medication Sig Dispense Refill    ciprofloxacin (CIPRO) 250 MG Oral Tab Take 1 tablet (250 mg total) by mouth 2 (two) times daily for 5 days. 10 tablet 0    CRANBERRY OR Take by mouth. fluticasone propionate 50 MCG/ACT Nasal Suspension 1 spray by Nasal route daily. One spray per each nostril daily. 1 each 0    sucralfate 1 g Oral Tab Take 1 tablet (1 g total) by mouth 3 (three) times daily before meals. pantoprazole 40 MG Oral Tab EC Take 1 tablet (40 mg total) by mouth every morning before breakfast. 90 tablet 3    amLODIPine besylate 10 MG Oral Tab Take 1 tablet (10 mg total) by mouth daily. ezetimibe 10 MG Oral Tab Take 1 tablet (10 mg total) by mouth nightly. Calcium Carbonate Antacid 500 MG Oral Chew Tab Chew 1 tablet (500 mg total) by mouth 3 (three) times daily as needed. 0    Atorvastatin Calcium 40 MG Oral Tab Take 1 tablet (40 mg total) by mouth daily. Clopidogrel Bisulfate 75 MG Oral Tab Take 1 tablet (75 mg total) by mouth daily. Metoprolol Succinate  MG Oral Tablet 24 Hr Take 1 tablet (100 mg total) by mouth daily. aspirin 81 MG Oral Tab EC Take 1 tablet (81 mg total) by mouth daily.         Past Medical History:   Diagnosis Date    Actinic keratoses 2013    left jaw    Anesthesia complication     Cataract     Coronary atherosclerosis     Essential hypertension     GERD (gastroesophageal reflux disease)     High blood pressure     High cholesterol     History of stomach ulcers     Osteoarthritis     PONV (postoperative nausea and vomiting)     SCC (squamous cell carcinoma) 2023    Right dorsal hand    Squamous carcinoma     right cheek    Squamous cell carcinoma     lateral left cheek      Past Surgical History:   Procedure Laterality Date    ANGIOPLASTY (CORONARY)      multiple angioplasties    ANGIOPLASTY (CORONARY)   and     CATH BARE METAL STENT (BMS)      CATH DRUG ELUTING STENT      TOTAL KNEE REPLACEMENT        Family History   Problem Relation Age of Onset    Stroke Father         CVA    Lung Disorder Father         lung disease    Heart Disease Father     Breast Cancer Mother 77    Hypertension Mother     Cancer Mother         Dx age 65- at 79    Arthritis Daughter         rheumatoid    Other (benign brain lesion) Son       Social History    Socioeconomic History      Marital status:     Tobacco Use      Smoking status: Never      Smokeless tobacco: Never    Vaping Use      Vaping Use: Never used    Substance and Sexual Activity      Alcohol use: No        Alcohol/week: 0.0 standard drinks of alcohol      Drug use: No      Sexual activity: Not Currently    Other Topics      Concerns:        Caffeine Concern: Yes        Pt has a pacemaker: No        Pt has a defibrillator: No        Reaction to local anesthetic: No          REVIEW OF SYSTEMS:   Denies nausea, fever, chills  No calf pain  No other muscle or joint aches  Denies chest pain or shortness of breath. EXAM:   There were no vitals taken for this visit. Constitutional:   Patient in no apparent distress. Well kept. Of normal body habitus. Alert and oriented to person, place, and time.   Vascular Examination:  DP pulse is 2/4  PT pulse is 2/4  Capillary refill is immediate  Temperature warm proximally to warm distally bilateral  Integumentary Examination:   The patient's nails appear incurvated, thickened, elongated, dystrophic, discolored with subungual debris 1-5 right, 1-5  left nails. Skin is of diminished texture and decreased turgor. Pre ulcerative callus left sub 1st met head    Neurological Examination:  Monofilament (10-g) sensation is 3/5 to right and 3/5 to left. Sharp/dull is present to right and is present to left. Parasthesias absent. Musculoskeletal Examination:  Muscle Strength is 5/5. Bunions Deformity present  bilateral.  Hammer digit deformity present digits 2-5  bilateral.            LABS & IMAGING:     Lab Results   Component Value Date     (H) 04/07/2023    BUN 18 04/07/2023    CREATSERUM 1.21 (H) 04/07/2023    BUNCREA 14.9 04/07/2023    ANIONGAP 9 04/07/2023    GFRAA 54 (L) 10/23/2021    GFRNAA 47 (L) 10/23/2021    CA 8.8 04/07/2023     04/07/2023    K 4.1 04/07/2023     04/07/2023    CO2 20.0 (L) 04/07/2023    OSMOCALC 286 04/07/2023        No results found for: \"EAG\", \"A1C\"     No results found. ASSESSMENT AND PLAN:   Diagnoses and all orders for this visit:    Idiopathic polyneuropathy    Pre-ulcerative calluses    Onychomycosis    Bilateral bunions            Plan:     Neuropathy education and instructions have been provided. We reviewed and discussed the following:    -risk categories related to pts with neuropathy and foot or lower extremity complications    -daily monitoring/inspection of feet and shoes.   -regular follow up with PCP and specialty providers as recommended   -Lower extremity complications related to neuropathy were reviewed and stressed prevention. Evaluated the patient. Discussed treatment options with the patient. Discussed with patient proper care and hygiene for their feet. Patient tolerated procedures well, without incident. Instrumentation used includes nail nippers and electric  where appropriate.   Procedure: (66487 Debridement of toenails 6-10) Surgically debrided and mechanically reduced 6 or more toenails. Hemmorhage occurred none. Nails that were debrided appeared dystrophic and caused the patient pain in shoe gear. Nails 5 Left & 5 Right. Evaluated patient. Discussed treatment options with patient. Discussed proper hygiene and care for feet as well as use of emollient creams (ie Urea based creams)  Answered all patient questions. Discussed offloading hyperkeratotic lesions with proper shoe gear, offloading pads, and insoles. Procedures: (CPT 64593 Paring or cutting of benign hyperkeratotic lesion)  One lesion pared utilizing #15 blade left foot without incident. Prescribed custom multi density orthotics to offload left sub 1st met head pre ulcerative callus, she did not get      RTC 65 days for high risk foot care and ensure no wound to left sub 1st met head. No follow-ups on file.     Silvia Hurst DPM  9/26/23

## 2023-10-05 ENCOUNTER — OFFICE VISIT (OUTPATIENT)
Dept: DERMATOLOGY CLINIC | Facility: CLINIC | Age: 83
End: 2023-10-05

## 2023-10-05 DIAGNOSIS — L30.9 DERMATITIS: ICD-10-CM

## 2023-10-05 DIAGNOSIS — D22.9 MULTIPLE MELANOCYTIC NEVI: ICD-10-CM

## 2023-10-05 DIAGNOSIS — L82.0 INFLAMED SEBORRHEIC KERATOSIS: ICD-10-CM

## 2023-10-05 DIAGNOSIS — D48.5 NEOPLASM OF UNCERTAIN BEHAVIOR OF SKIN: Primary | ICD-10-CM

## 2023-10-05 DIAGNOSIS — L57.0 ACTINIC KERATOSIS: ICD-10-CM

## 2023-10-05 DIAGNOSIS — L81.4 SOLAR LENTIGO: ICD-10-CM

## 2023-10-05 DIAGNOSIS — Z85.828 PERSONAL HISTORY OF SKIN CANCER: ICD-10-CM

## 2023-10-05 DIAGNOSIS — L82.1 SEBORRHEIC KERATOSES: ICD-10-CM

## 2023-10-05 DIAGNOSIS — D23.9 BENIGN NEOPLASM OF SKIN, UNSPECIFIED LOCATION: ICD-10-CM

## 2023-10-05 PROCEDURE — 11102 TANGNTL BX SKIN SINGLE LES: CPT | Performed by: DERMATOLOGY

## 2023-10-05 PROCEDURE — 17000 DESTRUCT PREMALG LESION: CPT | Performed by: DERMATOLOGY

## 2023-10-05 PROCEDURE — 99213 OFFICE O/P EST LOW 20 MIN: CPT | Performed by: DERMATOLOGY

## 2023-10-05 PROCEDURE — 1126F AMNT PAIN NOTED NONE PRSNT: CPT | Performed by: DERMATOLOGY

## 2023-10-05 PROCEDURE — 88304 TISSUE EXAM BY PATHOLOGIST: CPT | Performed by: DERMATOLOGY

## 2023-10-05 RX ORDER — TRIAMCINOLONE ACETONIDE 1 MG/G
CREAM TOPICAL
COMMUNITY
Start: 2023-05-03

## 2023-10-09 ENCOUNTER — OFFICE VISIT (OUTPATIENT)
Dept: FAMILY MEDICINE CLINIC | Facility: CLINIC | Age: 83
End: 2023-10-09

## 2023-10-09 VITALS
BODY MASS INDEX: 33.85 KG/M2 | OXYGEN SATURATION: 96 % | TEMPERATURE: 98 F | WEIGHT: 203.19 LBS | HEART RATE: 63 BPM | HEIGHT: 65 IN | SYSTOLIC BLOOD PRESSURE: 117 MMHG | DIASTOLIC BLOOD PRESSURE: 71 MMHG

## 2023-10-09 DIAGNOSIS — Z23 IMMUNIZATION DUE: ICD-10-CM

## 2023-10-09 DIAGNOSIS — Z12.31 ENCOUNTER FOR SCREENING MAMMOGRAM FOR MALIGNANT NEOPLASM OF BREAST: ICD-10-CM

## 2023-10-09 DIAGNOSIS — N39.0 RECURRENT UTI: ICD-10-CM

## 2023-10-09 DIAGNOSIS — Z00.00 ENCOUNTER FOR ANNUAL HEALTH EXAMINATION: Primary | ICD-10-CM

## 2023-10-09 DIAGNOSIS — K44.9 HIATAL HERNIA: ICD-10-CM

## 2023-10-09 DIAGNOSIS — L97.521 NEUROPATHIC ULCER OF LEFT FOOT, LIMITED TO BREAKDOWN OF SKIN (HCC): ICD-10-CM

## 2023-10-09 DIAGNOSIS — K86.9 PANCREATIC LESION: ICD-10-CM

## 2023-10-09 DIAGNOSIS — L82.0 INFLAMED SEBORRHEIC KERATOSIS: ICD-10-CM

## 2023-10-09 DIAGNOSIS — Z13.6 SCREENING FOR CARDIOVASCULAR CONDITION: ICD-10-CM

## 2023-10-09 DIAGNOSIS — D13.6 BENIGN PANCREATIC TUMOR: ICD-10-CM

## 2023-10-09 DIAGNOSIS — L57.8 SUN-DAMAGED SKIN: ICD-10-CM

## 2023-10-09 DIAGNOSIS — I46.9 CARDIAC ARREST (HCC): ICD-10-CM

## 2023-10-09 DIAGNOSIS — I25.10 CORONARY ARTERY DISEASE INVOLVING NATIVE CORONARY ARTERY OF NATIVE HEART WITHOUT ANGINA PECTORIS: ICD-10-CM

## 2023-10-09 DIAGNOSIS — L57.0 ACTINIC KERATOSIS: ICD-10-CM

## 2023-10-09 DIAGNOSIS — N17.9 AKI (ACUTE KIDNEY INJURY) (HCC): ICD-10-CM

## 2023-10-09 DIAGNOSIS — E78.5 DYSLIPIDEMIA: ICD-10-CM

## 2023-10-09 DIAGNOSIS — Z85.828 PERSONAL HISTORY OF SKIN CANCER: ICD-10-CM

## 2023-10-09 DIAGNOSIS — I10 ESSENTIAL HYPERTENSION: ICD-10-CM

## 2023-10-09 PROCEDURE — 1126F AMNT PAIN NOTED NONE PRSNT: CPT | Performed by: STUDENT IN AN ORGANIZED HEALTH CARE EDUCATION/TRAINING PROGRAM

## 2023-10-09 PROCEDURE — G0439 PPPS, SUBSEQ VISIT: HCPCS | Performed by: STUDENT IN AN ORGANIZED HEALTH CARE EDUCATION/TRAINING PROGRAM

## 2023-10-14 ENCOUNTER — LAB ENCOUNTER (OUTPATIENT)
Dept: LAB | Age: 83
End: 2023-10-14
Attending: STUDENT IN AN ORGANIZED HEALTH CARE EDUCATION/TRAINING PROGRAM
Payer: MEDICARE

## 2023-10-14 DIAGNOSIS — D13.6 BENIGN PANCREATIC TUMOR: ICD-10-CM

## 2023-10-14 DIAGNOSIS — I10 ESSENTIAL HYPERTENSION: ICD-10-CM

## 2023-10-14 DIAGNOSIS — K86.9 PANCREATIC LESION: ICD-10-CM

## 2023-10-14 DIAGNOSIS — E78.5 DYSLIPIDEMIA: ICD-10-CM

## 2023-10-14 DIAGNOSIS — Z13.6 SCREENING FOR CARDIOVASCULAR CONDITION: ICD-10-CM

## 2023-10-14 LAB
ALBUMIN SERPL-MCNC: 3.2 G/DL (ref 3.4–5)
ALBUMIN/GLOB SERPL: 0.8 {RATIO} (ref 1–2)
ALP LIVER SERPL-CCNC: 161 U/L
ALT SERPL-CCNC: 16 U/L
ANION GAP SERPL CALC-SCNC: 9 MMOL/L (ref 0–18)
AST SERPL-CCNC: 19 U/L (ref 15–37)
BASOPHILS # BLD AUTO: 0.05 X10(3) UL (ref 0–0.2)
BASOPHILS NFR BLD AUTO: 0.5 %
BILIRUB SERPL-MCNC: 0.6 MG/DL (ref 0.1–2)
BUN BLD-MCNC: 13 MG/DL (ref 7–18)
BUN/CREAT SERPL: 11.3 (ref 10–20)
CALCIUM BLD-MCNC: 8.8 MG/DL (ref 8.5–10.1)
CHLORIDE SERPL-SCNC: 107 MMOL/L (ref 98–112)
CHOLEST SERPL-MCNC: 120 MG/DL (ref ?–200)
CO2 SERPL-SCNC: 24 MMOL/L (ref 21–32)
CREAT BLD-MCNC: 1.15 MG/DL
DEPRECATED RDW RBC AUTO: 47.3 FL (ref 35.1–46.3)
EGFRCR SERPLBLD CKD-EPI 2021: 47 ML/MIN/1.73M2 (ref 60–?)
EOSINOPHIL # BLD AUTO: 0.42 X10(3) UL (ref 0–0.7)
EOSINOPHIL NFR BLD AUTO: 4.3 %
ERYTHROCYTE [DISTWIDTH] IN BLOOD BY AUTOMATED COUNT: 15.5 % (ref 11–15)
FASTING PATIENT LIPID ANSWER: YES
FASTING STATUS PATIENT QL REPORTED: YES
GGT SERPL-CCNC: 17 U/L
GLOBULIN PLAS-MCNC: 3.8 G/DL (ref 2.8–4.4)
GLUCOSE BLD-MCNC: 116 MG/DL (ref 70–99)
HCT VFR BLD AUTO: 39.3 %
HDLC SERPL-MCNC: 53 MG/DL (ref 40–59)
HGB BLD-MCNC: 12 G/DL
IMM GRANULOCYTES # BLD AUTO: 0.03 X10(3) UL (ref 0–1)
IMM GRANULOCYTES NFR BLD: 0.3 %
LDLC SERPL CALC-MCNC: 46 MG/DL (ref ?–100)
LIPASE SERPL-CCNC: 24 U/L (ref 13–75)
LYMPHOCYTES # BLD AUTO: 2.01 X10(3) UL (ref 1–4)
LYMPHOCYTES NFR BLD AUTO: 20.8 %
MCH RBC QN AUTO: 25.4 PG (ref 26–34)
MCHC RBC AUTO-ENTMCNC: 30.5 G/DL (ref 31–37)
MCV RBC AUTO: 83.3 FL
MONOCYTES # BLD AUTO: 0.72 X10(3) UL (ref 0.1–1)
MONOCYTES NFR BLD AUTO: 7.4 %
NEUTROPHILS # BLD AUTO: 6.44 X10 (3) UL (ref 1.5–7.7)
NEUTROPHILS # BLD AUTO: 6.44 X10(3) UL (ref 1.5–7.7)
NEUTROPHILS NFR BLD AUTO: 66.7 %
NONHDLC SERPL-MCNC: 67 MG/DL (ref ?–130)
OSMOLALITY SERPL CALC.SUM OF ELEC: 291 MOSM/KG (ref 275–295)
PLATELET # BLD AUTO: 421 10(3)UL (ref 150–450)
POTASSIUM SERPL-SCNC: 4.3 MMOL/L (ref 3.5–5.1)
PROT SERPL-MCNC: 7 G/DL (ref 6.4–8.2)
RBC # BLD AUTO: 4.72 X10(6)UL
SODIUM SERPL-SCNC: 140 MMOL/L (ref 136–145)
T4 FREE SERPL-MCNC: 1 NG/DL (ref 0.8–1.7)
TRIGL SERPL-MCNC: 116 MG/DL (ref 30–149)
TSI SER-ACNC: 5.71 MIU/ML (ref 0.36–3.74)
VLDLC SERPL CALC-MCNC: 16 MG/DL (ref 0–30)
WBC # BLD AUTO: 9.7 X10(3) UL (ref 4–11)

## 2023-10-14 PROCEDURE — 84443 ASSAY THYROID STIM HORMONE: CPT

## 2023-10-14 PROCEDURE — 36415 COLL VENOUS BLD VENIPUNCTURE: CPT

## 2023-10-14 PROCEDURE — 84439 ASSAY OF FREE THYROXINE: CPT

## 2023-10-14 PROCEDURE — 82977 ASSAY OF GGT: CPT

## 2023-10-14 PROCEDURE — 85025 COMPLETE CBC W/AUTO DIFF WBC: CPT

## 2023-10-14 PROCEDURE — 83690 ASSAY OF LIPASE: CPT

## 2023-10-14 PROCEDURE — 84080 ASSAY ALKALINE PHOSPHATASES: CPT | Performed by: STUDENT IN AN ORGANIZED HEALTH CARE EDUCATION/TRAINING PROGRAM

## 2023-10-14 PROCEDURE — 84075 ASSAY ALKALINE PHOSPHATASE: CPT | Performed by: STUDENT IN AN ORGANIZED HEALTH CARE EDUCATION/TRAINING PROGRAM

## 2023-10-14 PROCEDURE — 80053 COMPREHEN METABOLIC PANEL: CPT

## 2023-10-14 PROCEDURE — 80061 LIPID PANEL: CPT

## 2023-10-18 LAB
ALK PHOSPHATASE: 170 IU/L
BONE FRAC: 30 %
INTESTINAL FRAC: 1 %
LIVER FRAC: 69 %

## 2023-10-22 NOTE — PROGRESS NOTES
Operative Report                     Shave/  Tangential biopsy     Clinical diagnosis:    Size of lesion:    Location:pt with history of skin cancer growing Spec 1 Description >>>>>: right lower cheek Spec 1 Comment: inflamed cyst vs other 1cm nodule     Procedure: With patient in appropriate position the skin of the above was scrubbed with alcohol. Anesthesia was obtained with 1% Xylocaine with epinephrine. The skin surrounding the lesion was placed under tension and the lesion was incised using a #15 scalpel blade. The specimen was sent for histopathologic exam.    Hemostasis was obtained with electrocautery/aluminum chloride. Estimated blood loss less than 2 cc. Biopsy dressed with Polysporin, bandage. Pressure dressing:   No    Complications: None    Written instructions given and reviewed with patient    Await pathology    Contact information reviewed.     Procedural physician:  Ld Mckeon MD

## 2023-10-22 NOTE — PROGRESS NOTES
Rosana Villanueva is a 78year old female. HPI:     CC:  Patient presents with:  Full Skin Exam: Hx of AKS and SCC. LOV 23, patient states present today for follow up to recheck the right dorsal hand r/t biopsy of SCC excision done by Dr. Carisa Rodriguez. Area is healing well, no concerns at this moment. Allergies:  Bactrim Ds, Codeine, and Macrobid [Nitrofurantoin]    HISTORY:    Past Medical History:   Diagnosis Date    Actinic keratoses 2013    left jaw    Anesthesia complication     Cataract     Coronary atherosclerosis     Essential hypertension     GERD (gastroesophageal reflux disease)     High blood pressure     High cholesterol     History of stomach ulcers     Osteoarthritis     PONV (postoperative nausea and vomiting)     SCC (squamous cell carcinoma) 2023    Right dorsal hand    Squamous carcinoma     right cheek    Squamous cell carcinoma     lateral left cheek      Past Surgical History:   Procedure Laterality Date    ANGIOPLASTY (CORONARY)      multiple angioplasties    ANGIOPLASTY (CORONARY)   and     CATH BARE METAL STENT (BMS)      CATH DRUG ELUTING STENT      TOTAL KNEE REPLACEMENT        Family History   Problem Relation Age of Onset    Stroke Father         CVA    Lung Disorder Father         lung disease    Heart Disease Father     Breast Cancer Mother 77    Hypertension Mother     Cancer Mother         Dx age 65- at 79    Arthritis Daughter         rheumatoid    Other (benign brain lesion) Son       Social History     Socioeconomic History    Marital status:     Tobacco Use    Smoking status: Never     Passive exposure: Never    Smokeless tobacco: Never   Vaping Use    Vaping Use: Never used   Substance and Sexual Activity    Alcohol use: No     Alcohol/week: 0.0 standard drinks of alcohol    Drug use: No    Sexual activity: Not Currently   Other Topics Concern    Caffeine Concern Yes    Grew up on a farm No    History of tanning Yes    Outdoor occupation No    Pt has a pacemaker No    Pt has a defibrillator No    Reaction to local anesthetic No        Current Outpatient Medications   Medication Sig Dispense Refill    triamcinolone 0.1 % External Cream       CRANBERRY OR Take by mouth. fluticasone propionate 50 MCG/ACT Nasal Suspension 1 spray by Nasal route daily. One spray per each nostril daily. 1 each 0    sucralfate 1 g Oral Tab Take 1 tablet (1 g total) by mouth 3 (three) times daily before meals. pantoprazole 40 MG Oral Tab EC Take 1 tablet (40 mg total) by mouth every morning before breakfast. 90 tablet 3    amLODIPine besylate 10 MG Oral Tab Take 1 tablet (10 mg total) by mouth daily. ezetimibe 10 MG Oral Tab Take 1 tablet (10 mg total) by mouth nightly. Calcium Carbonate Antacid 500 MG Oral Chew Tab Chew 1 tablet (500 mg total) by mouth 3 (three) times daily as needed. 0    Atorvastatin Calcium 40 MG Oral Tab Take 1 tablet (40 mg total) by mouth daily. Clopidogrel Bisulfate 75 MG Oral Tab Take 1 tablet (75 mg total) by mouth daily. Metoprolol Succinate  MG Oral Tablet 24 Hr Take 1 tablet (100 mg total) by mouth daily. aspirin 81 MG Oral Tab EC Take 1 tablet (81 mg total) by mouth daily.        Allergies:     Bactrim Ds              UNKNOWN  Codeine                 UNKNOWN  Macrobid [Nitrofura*    UNKNOWN    Past Medical History:   Diagnosis Date    Actinic keratoses 2013    left jaw    Anesthesia complication     Cataract     Coronary atherosclerosis     Essential hypertension     GERD (gastroesophageal reflux disease)     High blood pressure     High cholesterol     History of stomach ulcers     Osteoarthritis     PONV (postoperative nausea and vomiting)     SCC (squamous cell carcinoma) 04/2023    Right dorsal hand    Squamous carcinoma 2001    right cheek    Squamous cell carcinoma 1997    lateral left cheek     Past Surgical History:   Procedure Laterality Date    ANGIOPLASTY (CORONARY)  2013 multiple angioplasties    ANGIOPLASTY (CORONARY)   and 2017    CATH BARE METAL STENT (BMS)      CATH DRUG ELUTING STENT      TOTAL KNEE REPLACEMENT       Social History    Socioeconomic History      Marital status:        Spouse name: Not on file      Number of children: Not on file      Years of education: Not on file      Highest education level: Not on file    Occupational History      Not on file    Tobacco Use      Smoking status: Never      Smokeless tobacco: Never    Vaping Use      Vaping Use: Never used    Substance and Sexual Activity      Alcohol use: No        Alcohol/week: 0.0 standard drinks of alcohol      Drug use: No      Sexual activity: Not Currently    Other Topics      Concerns:         Service: Not Asked        Blood Transfusions: Not Asked        Caffeine Concern: Yes        Occupational Exposure: Not Asked        Hobby Hazards: Not Asked        Sleep Concern: Not Asked        Stress Concern: Not Asked        Weight Concern: Not Asked        Special Diet: Not Asked        Back Care: Not Asked        Exercise: Not Asked        Bike Helmet: Not Asked        Seat Belt: Not Asked        Self-Exams: Not Asked        Grew up on a farm: No        History of tanning: Yes        Outdoor occupation: No        Pt has a pacemaker: No        Pt has a defibrillator: No        Breast feeding: Not Asked        Reaction to local anesthetic: No    Social History Narrative      Not on file    Social Determinants of Health  Financial Resource Strain: Not on file  Food Insecurity: Not on file  Transportation Needs: Not on file  Physical Activity: Not on file  Stress: Not on file  Social Connections: Not on file  Housing Stability: Not on file  Family History   Problem Relation Age of Onset    Stroke Father         CVA    Lung Disorder Father         lung disease    Heart Disease Father     Breast Cancer Mother 77    Hypertension Mother     Cancer Mother         Dx age 65- at 79    Arthritis Daughter         rheumatoid    Other (benign brain lesion) Son        There were no vitals filed for this visit. HPI:    Patient presents with:  Full Skin Exam: Hx of AKS and SCC. LOV 4/13/23, patient states present today for follow up to recheck the right dorsal hand r/t biopsy of SCC excision done by Dr. Karlene Colon. Area is healing well, no concerns at this moment. Follow-up history SCC AK's, recent biopsy wart right upper arm  Here with her daughter  Past notes/ records and appropriate/relevant lab results including pathology and past body maps reviewed. Including outside notes/ PCP notes as appropriate. Updated and new information noted in current visit. Patient presents with concerns above. Patient has been in their usual state of health. History, medications, allergies reviewed as noted. ROS:  new relevant systemic complaints as noted       Physical Examination:     Well-developed well-nourished patient alert oriented in no acute distress. Exam performed, including scalp, head, neck, face,nails, hair, external eyes, including conjunctival mucosa, eyelids, lips external ears, back, chest,/ breasts, axillae,  abdomen, arms, legs, palms. Multiple light to medium brown, well marginated, uniformly pigmented, macules and papules 6 mm and less scattered on exam. pigmented lesions examined with dermoscopy benign-appearing patterns. Waxy tannish keratotic papules scattered, cherry-red vascular papules scattered. See map today's date for lesions noted . Otherwise remarkable for lesions as noted on map.   See details of examination  See Assessment /Plan for additional history and physical exam also:    Assessment / plan:    Orders Placed This Encounter      Pathology Tissue P      Meds & Refills for this Visit:  Requested Prescriptions      No prescriptions requested or ordered in this encounter         Neoplasm of uncertain behavior of skin  (primary encounter diagnosis)  Actinic keratosis  Seborrheic keratoses  Multiple melanocytic nevi  Benign neoplasm of skin, unspecified location  Solar lentigo  Personal history of skin cancer  Dermatitis  Inflamed seborrheic keratosis    See details on map. Remarkable for:    pt with history of skin cancer growing Spec 1 Description >>>>>: right lower cheek Spec 1 Comment: inflamed cyst vs other 1cm nodule   Shave/ tangential biopsy performed, operative note and consent in chart further plans pending pathology      Right dorsal hand SCC post excision with Dr. Susi Contreras no recurrence    Erythematous scaling keratotic papules noted at sites noted on map  Actinic Keratoses. Precancerous nature discussed. Sun protection, sunscreen/ blocks encouraged Lesions treated with cryo- . Biopsy if not resolved. Right upper lipx1    Numerous benign keratoses reassurance lentigines. Biopsy wart right upper arm no recurrence healing well. History of AK's, SCC no recurrence SCC  As noted previously  In particular new keratoses over the dorsal hands arms  Extensive lentigines, generalized face arms chest legs waxy tan keratotic papules lesions in areas of concern as noted reassurance given. Benign nature discussed. Possibility of cryo, alphahydroxy acids over-the-counter retinol's discussed. Continue monitoring sunscreen  No other susupicious lesions on todays  Exam.  Few scattered nevi benign-appearing monitor    More dermatitis curious stable continue moisturizers,    Please refer to map for specific lesions. See additional diagnoses. Pros cons of various therapies, risks benefits discussed. Pathophysiology discussed with patient. Therapeutic options reviewed. See  Medications in grid. Instructions reviewed at length. Benign nevi, seborrheic  keratoses, cherry angiomas:  Reassurance regarding other benign skin lesions. Signs and symptoms of skin cancer, ABCDE's of melanoma discussed with patient. Sunscreen use, sun protection, self exams reviewed. Followup as noted RTC routine checkup 6 mos - one year or p.r.n. Encounter Times Including precharting, reviewing chart, prior notes obtaining history: 10 minutes, medical exam :10 minutes, notes on body map, plan, counseling 10minutes My total time spent caring for the patient on the day of the encounter: 30 minutes     The patient indicates understanding of these issues and agrees to the plan. The patient is asked to return as noted in follow-up/ above. This note was generated using Dragon voice recognition software. Please contact me regarding any confusion resulting from errors in recognition.

## 2023-12-05 ENCOUNTER — OFFICE VISIT (OUTPATIENT)
Dept: PODIATRY CLINIC | Facility: CLINIC | Age: 83
End: 2023-12-05
Payer: MEDICARE

## 2023-12-05 DIAGNOSIS — M21.612 BILATERAL BUNIONS: ICD-10-CM

## 2023-12-05 DIAGNOSIS — B35.1 ONYCHOMYCOSIS: ICD-10-CM

## 2023-12-05 DIAGNOSIS — M21.611 BILATERAL BUNIONS: ICD-10-CM

## 2023-12-05 DIAGNOSIS — G60.9 IDIOPATHIC POLYNEUROPATHY: Primary | ICD-10-CM

## 2023-12-05 DIAGNOSIS — L84 PRE-ULCERATIVE CALLUSES: ICD-10-CM

## 2023-12-05 PROCEDURE — 1126F AMNT PAIN NOTED NONE PRSNT: CPT | Performed by: PODIATRIST

## 2023-12-05 PROCEDURE — 99213 OFFICE O/P EST LOW 20 MIN: CPT | Performed by: PODIATRIST

## 2023-12-05 NOTE — PROGRESS NOTES
[facility], [facility] Podiatry  Progress Note    Miroslava Ayala is a 78year old female. Chief Complaint   Patient presents with    Toenail Care     Nail trim and foot check f/u- also here for bilateral foot callus check- rates pain 2/10 on and off         HPI:     This is a pleasant female with CAD on plavix and neuropathy. She complains of long thick toenails and painful calluses which she is unable to trim herself. She does use a cane or walker for stability. She denies any burning pain to her feet. Allergies: Bactrim ds, Codeine, and Macrobid [nitrofurantoin]   Current Outpatient Medications   Medication Sig Dispense Refill    triamcinolone 0.1 % External Cream       CRANBERRY OR Take by mouth. fluticasone propionate 50 MCG/ACT Nasal Suspension 1 spray by Nasal route daily. One spray per each nostril daily. 1 each 0    sucralfate 1 g Oral Tab Take 1 tablet (1 g total) by mouth 3 (three) times daily before meals. pantoprazole 40 MG Oral Tab EC Take 1 tablet (40 mg total) by mouth every morning before breakfast. 90 tablet 3    amLODIPine besylate 10 MG Oral Tab Take 1 tablet (10 mg total) by mouth daily. ezetimibe 10 MG Oral Tab Take 1 tablet (10 mg total) by mouth nightly. Calcium Carbonate Antacid 500 MG Oral Chew Tab Chew 1 tablet (500 mg total) by mouth 3 (three) times daily as needed. 0    Atorvastatin Calcium 40 MG Oral Tab Take 1 tablet (40 mg total) by mouth daily. Clopidogrel Bisulfate 75 MG Oral Tab Take 1 tablet (75 mg total) by mouth daily. Metoprolol Succinate  MG Oral Tablet 24 Hr Take 1 tablet (100 mg total) by mouth daily. aspirin 81 MG Oral Tab EC Take 1 tablet (81 mg total) by mouth daily.         Past Medical History:   Diagnosis Date    Actinic keratoses 2013    left jaw    Anesthesia complication     Cataract     Coronary atherosclerosis     Essential hypertension     GERD (gastroesophageal reflux disease)     High blood pressure High cholesterol     History of stomach ulcers     Osteoarthritis     PONV (postoperative nausea and vomiting)     SCC (squamous cell carcinoma) 2023    Right dorsal hand    Squamous carcinoma     right cheek    Squamous cell carcinoma     lateral left cheek      Past Surgical History:   Procedure Laterality Date    ANGIOPLASTY (CORONARY)      multiple angioplasties    ANGIOPLASTY (CORONARY)   and     CATH BARE METAL STENT (BMS)      CATH DRUG ELUTING STENT      TOTAL KNEE REPLACEMENT        Family History   Problem Relation Age of Onset    Stroke Father         CVA    Lung Disorder Father         lung disease    Heart Disease Father     Breast Cancer Mother 77    Hypertension Mother     Cancer Mother         Dx age 65- at 79    Arthritis Daughter         rheumatoid    Other (benign brain lesion) Son       Social History     Socioeconomic History    Marital status:    Tobacco Use    Smoking status: Never     Passive exposure: Never    Smokeless tobacco: Never   Vaping Use    Vaping Use: Never used   Substance and Sexual Activity    Alcohol use: No     Alcohol/week: 0.0 standard drinks of alcohol    Drug use: No    Sexual activity: Not Currently   Other Topics Concern    Caffeine Concern Yes    Grew up on a farm No    History of tanning Yes    Outdoor occupation No    Pt has a pacemaker No    Pt has a defibrillator No    Reaction to local anesthetic No           REVIEW OF SYSTEMS:   Denies nausea, fever, chills  No calf pain  No other muscle or joint aches  Denies chest pain or shortness of breath. EXAM:   There were no vitals taken for this visit. Constitutional:   Patient in no apparent distress. Well kept. Of normal body habitus. Alert and oriented to person, place, and time.   Vascular Examination:  DP pulse is 2/4  PT pulse is 2/4  Capillary refill is immediate  Temperature warm proximally to warm distally bilateral  Integumentary Examination:   The patient's nails appear incurvated, thickened, elongated, dystrophic, discolored with subungual debris 1-5 right, 1-5  left nails. Skin is of diminished texture and decreased turgor. Pre ulcerative callus left sub 1st met head    Neurological Examination:  Monofilament (10-g) sensation is 3/5 to right and 3/5 to left. Sharp/dull is present to right and is present to left. Parasthesias absent. Musculoskeletal Examination:  Muscle Strength is 5/5. Bunions Deformity present  bilateral.  Hammer digit deformity present digits 2-5  bilateral.            LABS & IMAGING:     Lab Results   Component Value Date     (H) 10/14/2023    BUN 13 10/14/2023    CREATSERUM 1.15 (H) 10/14/2023    BUNCREA 11.3 10/14/2023    ANIONGAP 9 10/14/2023    GFRAA 54 (L) 10/23/2021    GFRNAA 47 (L) 10/23/2021    CA 8.8 10/14/2023     10/14/2023    K 4.3 10/14/2023     10/14/2023    CO2 24.0 10/14/2023    OSMOCALC 291 10/14/2023        No results found for: \"EAG\", \"A1C\"     No results found. ASSESSMENT AND PLAN:   Diagnoses and all orders for this visit:    Idiopathic polyneuropathy    Pre-ulcerative calluses    Onychomycosis    Bilateral bunions              Plan:     Neuropathy education and instructions have been provided. We reviewed and discussed the following:    -risk categories related to pts with neuropathy and foot or lower extremity complications    -daily monitoring/inspection of feet and shoes.   -regular follow up with PCP and specialty providers as recommended   -Lower extremity complications related to neuropathy were reviewed and stressed prevention. Evaluated the patient. Discussed treatment options with the patient. Discussed with patient proper care and hygiene for their feet. Patient tolerated procedures well, without incident. Instrumentation used includes nail nippers and electric  where appropriate.   Procedure: (67997 Debridement of toenails 6-10) Surgically debrided and mechanically reduced 6 or more toenails. Hemmorhage occurred none. Nails that were debrided appeared dystrophic and caused the patient pain in shoe gear. Nails 5 Left & 5 Right. Evaluated patient. Discussed treatment options with patient. Discussed proper hygiene and care for feet as well as use of emollient creams (ie Urea based creams)  Answered all patient questions. Discussed offloading hyperkeratotic lesions with proper shoe gear, offloading pads, and insoles. Procedures: (CPT 75781 Paring or cutting of benign hyperkeratotic lesion)  One lesion pared utilizing #15 blade left foot without incident. Prescribed custom multi density orthotics to offload left sub 1st met head pre ulcerative callus, she did not get      RTC 65 days for high risk foot care and ensure no wound to left sub 1st met head. No follow-ups on file.     Boris Truong DPM  12/5/23

## 2023-12-27 ENCOUNTER — HOSPITAL ENCOUNTER (OUTPATIENT)
Dept: MAMMOGRAPHY | Age: 83
Discharge: HOME | End: 2023-12-27
Attending: STUDENT IN AN ORGANIZED HEALTH CARE EDUCATION/TRAINING PROGRAM
Payer: MEDICARE

## 2023-12-27 DIAGNOSIS — Z12.31 ENCOUNTER FOR SCREENING MAMMOGRAM FOR MALIGNANT NEOPLASM OF BREAST: ICD-10-CM

## 2023-12-27 PROCEDURE — 77067 SCR MAMMO BI INCL CAD: CPT | Performed by: STUDENT IN AN ORGANIZED HEALTH CARE EDUCATION/TRAINING PROGRAM

## 2023-12-27 PROCEDURE — 77063 BREAST TOMOSYNTHESIS BI: CPT | Performed by: STUDENT IN AN ORGANIZED HEALTH CARE EDUCATION/TRAINING PROGRAM

## 2023-12-28 ENCOUNTER — PROCEDURE VISIT (OUTPATIENT)
Dept: PHYSICAL MEDICINE AND REHAB | Facility: CLINIC | Age: 83
End: 2023-12-28
Payer: MEDICARE

## 2023-12-28 DIAGNOSIS — G56.01 CARPAL TUNNEL SYNDROME OF RIGHT WRIST: Primary | ICD-10-CM

## 2024-02-13 ENCOUNTER — OFFICE VISIT (OUTPATIENT)
Dept: PODIATRY CLINIC | Facility: CLINIC | Age: 84
End: 2024-02-13
Payer: MEDICARE

## 2024-02-13 DIAGNOSIS — M21.612 BILATERAL BUNIONS: ICD-10-CM

## 2024-02-13 DIAGNOSIS — G60.9 IDIOPATHIC POLYNEUROPATHY: Primary | ICD-10-CM

## 2024-02-13 DIAGNOSIS — M21.611 BILATERAL BUNIONS: ICD-10-CM

## 2024-02-13 DIAGNOSIS — B35.1 ONYCHOMYCOSIS: ICD-10-CM

## 2024-02-13 DIAGNOSIS — L84 PRE-ULCERATIVE CALLUSES: ICD-10-CM

## 2024-02-13 PROCEDURE — 99213 OFFICE O/P EST LOW 20 MIN: CPT | Performed by: PODIATRIST

## 2024-02-13 NOTE — PROGRESS NOTES
[facility] Podiatry  Progress Note    Bridgett Andino is a 83 year old female.   Chief Complaint   Patient presents with    Toenail Care     And calluses ,2 mo f/u - has no other c/o regarding her feet - the calluses are bothersome          HPI:     This is a pleasant female with CAD on plavix and neuropathy.    She complains of long thick toenails and painful calluses which she is unable to trim herself.  She does use a cane or walker for stability.  She denies any burning pain to her feet.        Allergies: Bactrim ds, Codeine, and Macrobid [nitrofurantoin]   Current Outpatient Medications   Medication Sig Dispense Refill    triamcinolone 0.1 % External Cream       CRANBERRY OR Take by mouth.      fluticasone propionate 50 MCG/ACT Nasal Suspension 1 spray by Nasal route daily. One spray per each nostril daily. 1 each 0    sucralfate 1 g Oral Tab Take 1 tablet (1 g total) by mouth 3 (three) times daily before meals.      pantoprazole 40 MG Oral Tab EC Take 1 tablet (40 mg total) by mouth every morning before breakfast. 90 tablet 3    amLODIPine besylate 10 MG Oral Tab Take 1 tablet (10 mg total) by mouth daily.      ezetimibe 10 MG Oral Tab Take 1 tablet (10 mg total) by mouth nightly.      Calcium Carbonate Antacid 500 MG Oral Chew Tab Chew 1 tablet (500 mg total) by mouth 3 (three) times daily as needed.  0    Atorvastatin Calcium 40 MG Oral Tab Take 1 tablet (40 mg total) by mouth daily.      Clopidogrel Bisulfate 75 MG Oral Tab Take 1 tablet (75 mg total) by mouth daily.      Metoprolol Succinate  MG Oral Tablet 24 Hr Take 1 tablet (100 mg total) by mouth daily.      aspirin 81 MG Oral Tab EC Take 1 tablet (81 mg total) by mouth daily.        Past Medical History:   Diagnosis Date    Actinic keratoses 2013    left jaw    Anesthesia complication     Cardiac arrest (HCC) 12/02/2019    Cataract     Coronary atherosclerosis     Essential hypertension     GERD (gastroesophageal reflux disease)      High blood pressure     High cholesterol     History of stomach ulcers     Osteoarthritis     PONV (postoperative nausea and vomiting)     SCC (squamous cell carcinoma) 2023    Right dorsal hand    Squamous carcinoma     right cheek    Squamous cell carcinoma     lateral left cheek      Past Surgical History:   Procedure Laterality Date    ANGIOPLASTY (CORONARY)      multiple angioplasties    ANGIOPLASTY (CORONARY)   and     CATH BARE METAL STENT (BMS)      CATH DRUG ELUTING STENT      TOTAL KNEE REPLACEMENT        Family History   Problem Relation Age of Onset    Breast Cancer Mother 66    Hypertension Mother     Cancer Mother         Dx age 65- at 70    Stroke Father         CVA    Lung Disorder Father         lung disease    Heart Disease Father     Arthritis Daughter         rheumatoid    Other (benign brain lesion) Son     Breast Cancer Niece         60's      Social History     Socioeconomic History    Marital status:    Tobacco Use    Smoking status: Never     Passive exposure: Never    Smokeless tobacco: Never   Vaping Use    Vaping Use: Never used   Substance and Sexual Activity    Alcohol use: No     Alcohol/week: 0.0 standard drinks of alcohol    Drug use: No    Sexual activity: Not Currently   Other Topics Concern    Caffeine Concern Yes    Grew up on a farm No    History of tanning Yes    Outdoor occupation No    Pt has a pacemaker No    Pt has a defibrillator No    Reaction to local anesthetic No           REVIEW OF SYSTEMS:   Denies nausea, fever, chills  No calf pain  No other muscle or joint aches  Denies chest pain or shortness of breath.      EXAM:   There were no vitals taken for this visit.    Constitutional:   Patient in no apparent distress. Well kept. Of normal body habitus. Alert and oriented to person, place, and time.  Vascular Examination:  DP pulse is 2/4  PT pulse is 2/4  Capillary refill is immediate  Temperature warm proximally to warm distally  bilateral  Integumentary Examination:   The patient's nails appear incurvated, thickened, elongated, dystrophic, discolored with subungual debris 1-5 right, 1-5  left nails.  Skin is of diminished texture and decreased turgor.    Pre ulcerative callus left sub 1st met head    Neurological Examination:  Monofilament (10-g) sensation is 3/5 to right and 3/5 to left.  Sharp/dull is present to right and is present to left.  Parasthesias absent.  Musculoskeletal Examination:  Muscle Strength is 5/5.  Bunions Deformity present  bilateral.  Hammer digit deformity present digits 2-5  bilateral.            LABS & IMAGING:     Lab Results   Component Value Date     (H) 10/14/2023    BUN 13 10/14/2023    CREATSERUM 1.15 (H) 10/14/2023    BUNCREA 11.3 10/14/2023    ANIONGAP 9 10/14/2023    GFRAA 54 (L) 10/23/2021    GFRNAA 47 (L) 10/23/2021    CA 8.8 10/14/2023     10/14/2023    K 4.3 10/14/2023     10/14/2023    CO2 24.0 10/14/2023    OSMOCALC 291 10/14/2023        No results found for: \"EAG\", \"A1C\"     No results found.     ASSESSMENT AND PLAN:   Diagnoses and all orders for this visit:    Idiopathic polyneuropathy    Pre-ulcerative calluses    Onychomycosis    Bilateral bunions                Plan:     Neuropathy education and instructions have been provided. We reviewed and discussed the following:    -risk categories related to pts with neuropathy and foot or lower extremity complications    -daily monitoring/inspection of feet and shoes.   -regular follow up with PCP and specialty providers as recommended   -Lower extremity complications related to neuropathy were reviewed and stressed prevention.        Evaluated the patient. Discussed treatment options with the patient.  Discussed with patient proper care and hygiene for their feet.  Patient tolerated procedures well, without incident.    Instrumentation used includes nail nippers and electric  where appropriate.  Procedure: (69583 Debridement  of toenails 6-10) Surgically debrided and mechanically reduced 6 or more toenails.Hemmorhage occurred none.Nails that were debrided appeared dystrophic and caused the patient pain in shoe gear.Nails 5 Left & 5 Right.      Evaluated patient. Discussed treatment options with patient.  Discussed proper hygiene and care for feet as well as use of emollient creams (ie Urea based creams)  Answered all patient questions. Discussed offloading hyperkeratotic lesions with proper shoe gear, offloading pads, and insoles.    Procedures: (CPT 40890 Paring or cutting of benign hyperkeratotic lesion)  One lesion pared utilizing #15 blade left foot without incident.      Prescribed custom multi density orthotics to offload left sub 1st met head pre ulcerative callus, she did not get      RTC 65 days for high risk foot care and ensure no wound to left sub 1st met head.      No follow-ups on file.    Jose G Santillan DPM  2/13/24

## 2024-02-19 ENCOUNTER — LAB ENCOUNTER (OUTPATIENT)
Dept: LAB | Age: 84
End: 2024-02-19
Attending: STUDENT IN AN ORGANIZED HEALTH CARE EDUCATION/TRAINING PROGRAM
Payer: MEDICARE

## 2024-02-19 ENCOUNTER — OFFICE VISIT (OUTPATIENT)
Dept: FAMILY MEDICINE CLINIC | Facility: CLINIC | Age: 84
End: 2024-02-19
Payer: MEDICARE

## 2024-02-19 VITALS
OXYGEN SATURATION: 95 % | SYSTOLIC BLOOD PRESSURE: 113 MMHG | HEIGHT: 65 IN | HEART RATE: 73 BPM | DIASTOLIC BLOOD PRESSURE: 63 MMHG | WEIGHT: 195 LBS | BODY MASS INDEX: 32.49 KG/M2

## 2024-02-19 DIAGNOSIS — R63.0 DECREASED APPETITE: ICD-10-CM

## 2024-02-19 DIAGNOSIS — R41.3 MEMORY CHANGES: ICD-10-CM

## 2024-02-19 DIAGNOSIS — N39.0 RECURRENT UTI: ICD-10-CM

## 2024-02-19 DIAGNOSIS — N17.9 AKI (ACUTE KIDNEY INJURY) (HCC): ICD-10-CM

## 2024-02-19 DIAGNOSIS — N17.9 AKI (ACUTE KIDNEY INJURY) (HCC): Primary | ICD-10-CM

## 2024-02-19 DIAGNOSIS — K86.9 PANCREATIC LESION (HCC): ICD-10-CM

## 2024-02-19 LAB
ALBUMIN SERPL-MCNC: 4.2 G/DL (ref 3.2–4.8)
ALBUMIN/GLOB SERPL: 1.4 {RATIO} (ref 1–2)
ALP LIVER SERPL-CCNC: 166 U/L
ALT SERPL-CCNC: 12 U/L
ANION GAP SERPL CALC-SCNC: 8 MMOL/L (ref 0–18)
AST SERPL-CCNC: 23 U/L (ref ?–34)
BILIRUB SERPL-MCNC: 0.6 MG/DL (ref 0.2–1.1)
BUN BLD-MCNC: 18 MG/DL (ref 9–23)
BUN/CREAT SERPL: 13.8 (ref 10–20)
CALCIUM BLD-MCNC: 9.7 MG/DL (ref 8.7–10.4)
CHLORIDE SERPL-SCNC: 105 MMOL/L (ref 98–112)
CO2 SERPL-SCNC: 27 MMOL/L (ref 21–32)
CREAT BLD-MCNC: 1.3 MG/DL
DEPRECATED RDW RBC AUTO: 46.4 FL (ref 35.1–46.3)
EGFRCR SERPLBLD CKD-EPI 2021: 40 ML/MIN/1.73M2 (ref 60–?)
ERYTHROCYTE [DISTWIDTH] IN BLOOD BY AUTOMATED COUNT: 16.2 % (ref 11–15)
FASTING STATUS PATIENT QL REPORTED: NO
GLOBULIN PLAS-MCNC: 3 G/DL (ref 2.8–4.4)
GLUCOSE BLD-MCNC: 121 MG/DL (ref 70–99)
HCT VFR BLD AUTO: 42.1 %
HGB BLD-MCNC: 12.8 G/DL
MCH RBC QN AUTO: 24.3 PG (ref 26–34)
MCHC RBC AUTO-ENTMCNC: 30.4 G/DL (ref 31–37)
MCV RBC AUTO: 79.9 FL
OSMOLALITY SERPL CALC.SUM OF ELEC: 293 MOSM/KG (ref 275–295)
PLATELET # BLD AUTO: 506 10(3)UL (ref 150–450)
POTASSIUM SERPL-SCNC: 4.6 MMOL/L (ref 3.5–5.1)
PROT SERPL-MCNC: 7.2 G/DL (ref 5.7–8.2)
RBC # BLD AUTO: 5.27 X10(6)UL
SODIUM SERPL-SCNC: 140 MMOL/L (ref 136–145)
T4 FREE SERPL-MCNC: 1.1 NG/DL (ref 0.8–1.7)
TSI SER-ACNC: 6.6 MIU/ML (ref 0.55–4.78)
WBC # BLD AUTO: 9.4 X10(3) UL (ref 4–11)

## 2024-02-19 PROCEDURE — 85027 COMPLETE CBC AUTOMATED: CPT

## 2024-02-19 PROCEDURE — 80053 COMPREHEN METABOLIC PANEL: CPT

## 2024-02-19 PROCEDURE — 99214 OFFICE O/P EST MOD 30 MIN: CPT | Performed by: STUDENT IN AN ORGANIZED HEALTH CARE EDUCATION/TRAINING PROGRAM

## 2024-02-19 PROCEDURE — 36415 COLL VENOUS BLD VENIPUNCTURE: CPT

## 2024-02-19 PROCEDURE — 84443 ASSAY THYROID STIM HORMONE: CPT

## 2024-02-19 PROCEDURE — 84439 ASSAY OF FREE THYROXINE: CPT

## 2024-02-19 NOTE — PROGRESS NOTES
HPI:    Patient ID: Bridgett Andino is a 83 year old female.    HPI  Pt presenting for follow-up. Daughter present for visit.    H/o decreased GFR  Has been hydrating well  Reports decreased appetite  Skipping more lunch  No increased heart burn    Complains of new memory changes  Forgetting recall  Multiple overnight awakenings  SLUMS 26  Mood stable, denies SH/SI/HI    Review of Systems   A comprehensive 10 point review of systems was completed.  Pertinent positives and negatives noted in the the HPI.       Current Outpatient Medications   Medication Sig Dispense Refill    CRANBERRY OR Take by mouth.      fluticasone propionate 50 MCG/ACT Nasal Suspension 1 spray by Nasal route daily. One spray per each nostril daily. 1 each 0    sucralfate 1 g Oral Tab Take 1 tablet (1 g total) by mouth 3 (three) times daily before meals.      pantoprazole 40 MG Oral Tab EC Take 1 tablet (40 mg total) by mouth every morning before breakfast. 90 tablet 3    amLODIPine besylate 10 MG Oral Tab Take 1 tablet (10 mg total) by mouth daily.      ezetimibe 10 MG Oral Tab Take 1 tablet (10 mg total) by mouth nightly.      Calcium Carbonate Antacid 500 MG Oral Chew Tab Chew 1 tablet (500 mg total) by mouth 3 (three) times daily as needed.  0    Atorvastatin Calcium 40 MG Oral Tab Take 1 tablet (40 mg total) by mouth daily.      Clopidogrel Bisulfate 75 MG Oral Tab Take 1 tablet (75 mg total) by mouth daily.      Metoprolol Succinate  MG Oral Tablet 24 Hr Take 1 tablet (100 mg total) by mouth daily.      aspirin 81 MG Oral Tab EC Take 1 tablet (81 mg total) by mouth daily.      triamcinolone 0.1 % External Cream        Allergies:  Allergies   Allergen Reactions    Bactrim Ds UNKNOWN    Codeine UNKNOWN    Macrobid [Nitrofurantoin] UNKNOWN      Vitals:    02/19/24 1047   BP: 113/63   Pulse: 73   SpO2: 95%   Weight: 195 lb (88.5 kg)   Height: 5' 5\" (1.651 m)       Body mass index is 32.45 kg/m².   PHYSICAL EXAM:   Physical  Exam  Vitals reviewed.   Constitutional:       General: She is not in acute distress.     Appearance: Normal appearance. She is well-developed.   HENT:      Head: Normocephalic and atraumatic.      Right Ear: External ear normal.      Left Ear: External ear normal.   Eyes:      Conjunctiva/sclera: Conjunctivae normal.   Neck:      Thyroid: No thyroid mass or thyroid tenderness.   Cardiovascular:      Rate and Rhythm: Normal rate and regular rhythm.      Pulses: Normal pulses.      Heart sounds: Normal heart sounds, S1 normal and S2 normal. No murmur heard.  Pulmonary:      Effort: Pulmonary effort is normal. No respiratory distress.      Breath sounds: Normal breath sounds. No wheezing, rhonchi or rales.   Abdominal:      General: Bowel sounds are normal.      Palpations: Abdomen is soft.      Tenderness: There is no abdominal tenderness. There is no guarding or rebound.   Musculoskeletal:      Cervical back: Normal range of motion and neck supple. No muscular tenderness.      Right lower leg: No edema.      Left lower leg: No edema.   Lymphadenopathy:      Cervical: No cervical adenopathy.   Skin:     General: Skin is warm and dry.      Coloration: Skin is not jaundiced.   Neurological:      General: No focal deficit present.      Mental Status: She is alert and oriented to person, place, and time. Mental status is at baseline.      Comments: SLUMS 26/30  9 animals, missed 2 recall words   Psychiatric:         Attention and Perception: Attention normal.         Mood and Affect: Mood normal.         Behavior: Behavior normal. Behavior is cooperative.         Cognition and Memory: Cognition normal.             ASSESSMENT/PLAN:   1. FEDERICO (acute kidney injury) (HCC)  Advised to continue to keep well-hydrated and avoid NSAIDs like Advil, Aleve, ibuprofen, naproxen.  May take only Tylenol arthritis if needed for aches and pains.  No chest pain, palpitations, shortness of breath or lower extremity edema at this time.  -  Comp Metabolic Panel (14); Future    2. Decreased appetite  Discussed DDx including pancreatic lesion, aging  Will check labs  Advised follow-up with GI, upcoming imaging  - TSH W Reflex To Free T4; Future  - CBC, Platelet; No Differential; Future  - Comp Metabolic Panel (14); Future    3. Recurrent UTI  Stable, no recent episodes  Continue to monitor    4. Pancreatic lesion (HCC)  As above    5. Memory changes  SLUMS 26/30  Discussed treatment options including continued surveillance, NeuroPsych eval, etc  Pt/daughter prefers to monitor for now    Pt verbalized understanding and agrees with plan.    Orders Placed This Encounter   Procedures    TSH W Reflex To Free T4    CBC, Platelet; No Differential    Comp Metabolic Panel (14)       Meds This Visit:  Requested Prescriptions      No prescriptions requested or ordered in this encounter       Imaging & Referrals:  None         ID#[id number]

## 2024-04-02 ENCOUNTER — HOSPITAL ENCOUNTER (OUTPATIENT)
Dept: MRI IMAGING | Facility: HOSPITAL | Age: 84
Discharge: HOME | End: 2024-04-02
Attending: INTERNAL MEDICINE
Payer: MEDICARE

## 2024-04-02 DIAGNOSIS — K86.2 PANCREATIC CYST (HCC): ICD-10-CM

## 2024-04-02 DIAGNOSIS — R93.5 ABNORMAL CT OF THE ABDOMEN: ICD-10-CM

## 2024-04-02 PROCEDURE — 74183 MRI ABD W/O CNTR FLWD CNTR: CPT | Performed by: INTERNAL MEDICINE

## 2024-04-02 PROCEDURE — A9575 INJ GADOTERATE MEGLUMI 0.1ML: HCPCS | Performed by: INTERNAL MEDICINE

## 2024-04-02 PROCEDURE — 76376 3D RENDER W/INTRP POSTPROCES: CPT | Performed by: INTERNAL MEDICINE

## 2024-04-02 RX ORDER — GADOTERATE MEGLUMINE 376.9 MG/ML
20 INJECTION INTRAVENOUS
Status: COMPLETED | OUTPATIENT
Start: 2024-04-02 | End: 2024-04-02

## 2024-04-02 RX ADMIN — GADOTERATE MEGLUMINE 18 ML: 376.9 INJECTION INTRAVENOUS at 08:50:00

## 2024-04-11 ENCOUNTER — OFFICE VISIT (OUTPATIENT)
Dept: DERMATOLOGY CLINIC | Facility: CLINIC | Age: 84
End: 2024-04-11
Payer: MEDICARE

## 2024-04-11 DIAGNOSIS — L82.1 SEBORRHEIC KERATOSES: ICD-10-CM

## 2024-04-11 DIAGNOSIS — D23.9 BENIGN NEOPLASM OF SKIN, UNSPECIFIED LOCATION: ICD-10-CM

## 2024-04-11 DIAGNOSIS — L81.4 SOLAR LENTIGO: ICD-10-CM

## 2024-04-11 DIAGNOSIS — D22.9 MULTIPLE MELANOCYTIC NEVI: ICD-10-CM

## 2024-04-11 DIAGNOSIS — L57.0 ACTINIC KERATOSIS: Primary | ICD-10-CM

## 2024-04-11 DIAGNOSIS — Z85.828 ENCOUNTER FOR FOLLOW-UP SURVEILLANCE OF SKIN CANCER: ICD-10-CM

## 2024-04-11 DIAGNOSIS — Z08 ENCOUNTER FOR FOLLOW-UP SURVEILLANCE OF SKIN CANCER: ICD-10-CM

## 2024-04-11 PROCEDURE — 99213 OFFICE O/P EST LOW 20 MIN: CPT | Performed by: DERMATOLOGY

## 2024-04-11 PROCEDURE — 17000 DESTRUCT PREMALG LESION: CPT | Performed by: DERMATOLOGY

## 2024-04-11 PROCEDURE — 17003 DESTRUCT PREMALG LES 2-14: CPT | Performed by: DERMATOLOGY

## 2024-04-23 ENCOUNTER — OFFICE VISIT (OUTPATIENT)
Dept: PODIATRY CLINIC | Facility: CLINIC | Age: 84
End: 2024-04-23
Payer: MEDICARE

## 2024-04-23 DIAGNOSIS — M21.612 BILATERAL BUNIONS: ICD-10-CM

## 2024-04-23 DIAGNOSIS — L84 PRE-ULCERATIVE CALLUSES: ICD-10-CM

## 2024-04-23 DIAGNOSIS — M21.611 BILATERAL BUNIONS: ICD-10-CM

## 2024-04-23 DIAGNOSIS — G60.9 IDIOPATHIC POLYNEUROPATHY: Primary | ICD-10-CM

## 2024-04-23 DIAGNOSIS — B35.1 ONYCHOMYCOSIS: ICD-10-CM

## 2024-04-23 PROCEDURE — 99213 OFFICE O/P EST LOW 20 MIN: CPT | Performed by: PODIATRIST

## 2024-04-23 NOTE — PROGRESS NOTES
[facility]  Progress Note    Bridgett Andino is a 83 year old female.   Chief Complaint   Patient presents with    Toenail Care     Pt here for nail care and foot check. No pain .         HPI:     This is a pleasant female with CAD on plavix and neuropathy.    She complains of long thick toenails and painful calluses which she is unable to trim herself.  She does use a cane or walker for stability.  She denies any burning pain to her feet.        Allergies: Bactrim ds, Codeine, and Macrobid [nitrofurantoin]   Current Outpatient Medications   Medication Sig Dispense Refill    triamcinolone 0.1 % External Cream       CRANBERRY OR Take by mouth.      fluticasone propionate 50 MCG/ACT Nasal Suspension 1 spray by Nasal route daily. One spray per each nostril daily. 1 each 0    sucralfate 1 g Oral Tab Take 1 tablet (1 g total) by mouth 3 (three) times daily before meals.      pantoprazole 40 MG Oral Tab EC Take 1 tablet (40 mg total) by mouth every morning before breakfast. 90 tablet 3    amLODIPine besylate 10 MG Oral Tab Take 1 tablet (10 mg total) by mouth daily.      ezetimibe 10 MG Oral Tab Take 1 tablet (10 mg total) by mouth nightly.      Calcium Carbonate Antacid 500 MG Oral Chew Tab Chew 1 tablet (500 mg total) by mouth 3 (three) times daily as needed.  0    Atorvastatin Calcium 40 MG Oral Tab Take 1 tablet (40 mg total) by mouth daily.      Clopidogrel Bisulfate 75 MG Oral Tab Take 1 tablet (75 mg total) by mouth daily.      Metoprolol Succinate  MG Oral Tablet 24 Hr Take 1 tablet (100 mg total) by mouth daily.      aspirin 81 MG Oral Tab EC Take 1 tablet (81 mg total) by mouth daily.        Past Medical History:    Actinic keratoses    left jaw    Anesthesia complication    Cardiac arrest (HCC)    Cataract    Coronary atherosclerosis    Essential hypertension    GERD (gastroesophageal reflux disease)    High blood pressure    High cholesterol    History of stomach ulcers     Osteoarthritis    PONV (postoperative nausea and vomiting)    SCC (squamous cell carcinoma)    Right dorsal hand    Squamous carcinoma    right cheek    Squamous cell carcinoma    lateral left cheek      Past Surgical History:   Procedure Laterality Date    Angioplasty (coronary)      multiple angioplasties    Angioplasty (coronary)   and 2017    Cath bare metal stent (bms)      Cath drug eluting stent      Total knee replacement        Family History   Problem Relation Age of Onset    Breast Cancer Mother 66    Hypertension Mother     Cancer Mother         Dx age 65- at 70    Stroke Father         CVA    Lung Disorder Father         lung disease    Heart Disease Father     Arthritis Daughter         rheumatoid    Other (benign brain lesion) Son     Breast Cancer Niece         60's      Social History     Socioeconomic History    Marital status:    Tobacco Use    Smoking status: Never     Passive exposure: Never    Smokeless tobacco: Never   Vaping Use    Vaping status: Never Used   Substance and Sexual Activity    Alcohol use: No     Alcohol/week: 0.0 standard drinks of alcohol    Drug use: No    Sexual activity: Not Currently   Other Topics Concern    Caffeine Concern Yes    Grew up on a farm No    History of tanning Yes    Outdoor occupation No    Pt has a pacemaker No    Pt has a defibrillator No    Reaction to local anesthetic No           REVIEW OF SYSTEMS:   Denies nausea, fever, chills  No calf pain  No other muscle or joint aches  Denies chest pain or shortness of breath.      EXAM:   There were no vitals taken for this visit.    Constitutional:   Patient in no apparent distress. Well kept. Of normal body habitus. Alert and oriented to person, place, and time.  Vascular Examination:  DP pulse is 2/4  PT pulse is 2/4  Capillary refill is immediate  Temperature warm proximally to warm distally bilateral  Integumentary Examination:   The patient's nails appear incurvated, thickened,  elongated, dystrophic, discolored with subungual debris 1-5 right, 1-5  left nails.  Skin is of diminished texture and decreased turgor.    Pre ulcerative callus left sub 1st met head    Neurological Examination:  Monofilament (10-g) sensation is 3/5 to right and 3/5 to left.  Sharp/dull is present to right and is present to left.  Parasthesias absent.  Musculoskeletal Examination:  Muscle Strength is 5/5.  Bunions Deformity present  bilateral.  Hammer digit deformity present digits 2-5  bilateral.            LABS & IMAGING:     Lab Results   Component Value Date     (H) 02/19/2024    BUN 18 02/19/2024    CREATSERUM 1.30 (H) 02/19/2024    BUNCREA 13.8 02/19/2024    ANIONGAP 8 02/19/2024    GFRAA 54 (L) 10/23/2021    GFRNAA 47 (L) 10/23/2021    CA 9.7 02/19/2024     02/19/2024    K 4.6 02/19/2024     02/19/2024    CO2 27.0 02/19/2024    OSMOCALC 293 02/19/2024        No results found for: \"EAG\", \"A1C\"     No results found.     ASSESSMENT AND PLAN:   Diagnoses and all orders for this visit:    Idiopathic polyneuropathy    Pre-ulcerative calluses    Onychomycosis    Bilateral bunions                  Plan:     Neuropathy education and instructions have been provided. We reviewed and discussed the following:    -risk categories related to pts with neuropathy and foot or lower extremity complications    -daily monitoring/inspection of feet and shoes.   -regular follow up with PCP and specialty providers as recommended   -Lower extremity complications related to neuropathy were reviewed and stressed prevention.        Evaluated the patient. Discussed treatment options with the patient.  Discussed with patient proper care and hygiene for their feet.  Patient tolerated procedures well, without incident.    Instrumentation used includes nail nippers and electric  where appropriate.  Procedure: (58098 Debridement of toenails 6-10) Surgically debrided and mechanically reduced 6 or more  toenails.Hemmorhage occurred none.Nails that were debrided appeared dystrophic and caused the patient pain in shoe gear.Nails 5 Left & 5 Right.      Evaluated patient. Discussed treatment options with patient.  Discussed proper hygiene and care for feet as well as use of emollient creams (ie Urea based creams)  Answered all patient questions. Discussed offloading hyperkeratotic lesions with proper shoe gear, offloading pads, and insoles.    Procedures: (CPT 65608 Paring or cutting of benign hyperkeratotic lesion)  One lesion pared utilizing #15 blade left foot without incident.      Prescribed custom multi density orthotics to offload left sub 1st met head pre ulcerative callus, she did not get      RTC 65 days for high risk foot care and ensure no wound to left sub 1st met head.      No follow-ups on file.    Jose G Santillan DPM  4/23/24

## 2024-04-28 NOTE — PROGRESS NOTES
Bridgett Andino is a 83 year old female.  HPI:     CC:    Chief Complaint   Patient presents with    Full Skin Exam     Established pt, presents for 6 months full body exam, denies any concerns. Personal hx of SCC         Allergies:  Bactrim ds, Codeine, and Macrobid [nitrofurantoin]    HISTORY:    Past Medical History:    Actinic keratoses    left jaw    Anesthesia complication    Cardiac arrest (HCC)    Cataract    Coronary atherosclerosis    Essential hypertension    GERD (gastroesophageal reflux disease)    High blood pressure    High cholesterol    History of stomach ulcers    Osteoarthritis    PONV (postoperative nausea and vomiting)    SCC (squamous cell carcinoma)    Right dorsal hand    Squamous carcinoma    right cheek    Squamous cell carcinoma    lateral left cheek      Past Surgical History:   Procedure Laterality Date    Angioplasty (coronary)      multiple angioplasties    Angioplasty (coronary)   and 2017    Cath bare metal stent (bms)      Cath drug eluting stent      Total knee replacement        Family History   Problem Relation Age of Onset    Breast Cancer Mother 66    Hypertension Mother     Cancer Mother         Dx age 65- at 70    Stroke Father         CVA    Lung Disorder Father         lung disease    Heart Disease Father     Arthritis Daughter         rheumatoid    Other (benign brain lesion) Son     Breast Cancer Niece         60's      Social History     Socioeconomic History    Marital status:    Tobacco Use    Smoking status: Never     Passive exposure: Never    Smokeless tobacco: Never   Vaping Use    Vaping status: Never Used   Substance and Sexual Activity    Alcohol use: No     Alcohol/week: 0.0 standard drinks of alcohol    Drug use: No    Sexual activity: Not Currently   Other Topics Concern    Caffeine Concern Yes    Grew up on a farm No    History of tanning Yes    Outdoor occupation No    Pt has a pacemaker No    Pt has a defibrillator No     Reaction to local anesthetic No        Current Outpatient Medications   Medication Sig Dispense Refill    CRANBERRY OR Take by mouth.      fluticasone propionate 50 MCG/ACT Nasal Suspension 1 spray by Nasal route daily. One spray per each nostril daily. 1 each 0    sucralfate 1 g Oral Tab Take 1 tablet (1 g total) by mouth 3 (three) times daily before meals.      pantoprazole 40 MG Oral Tab EC Take 1 tablet (40 mg total) by mouth every morning before breakfast. 90 tablet 3    amLODIPine besylate 10 MG Oral Tab Take 1 tablet (10 mg total) by mouth daily.      ezetimibe 10 MG Oral Tab Take 1 tablet (10 mg total) by mouth nightly.      Calcium Carbonate Antacid 500 MG Oral Chew Tab Chew 1 tablet (500 mg total) by mouth 3 (three) times daily as needed.  0    Atorvastatin Calcium 40 MG Oral Tab Take 1 tablet (40 mg total) by mouth daily.      Clopidogrel Bisulfate 75 MG Oral Tab Take 1 tablet (75 mg total) by mouth daily.      Metoprolol Succinate  MG Oral Tablet 24 Hr Take 1 tablet (100 mg total) by mouth daily.      aspirin 81 MG Oral Tab EC Take 1 tablet (81 mg total) by mouth daily.      triamcinolone 0.1 % External Cream        Allergies:   Allergies   Allergen Reactions    Bactrim Ds UNKNOWN    Codeine UNKNOWN    Macrobid [Nitrofurantoin] UNKNOWN       Past Medical History:    Actinic keratoses    left jaw    Anesthesia complication    Cardiac arrest (HCC)    Cataract    Coronary atherosclerosis    Essential hypertension    GERD (gastroesophageal reflux disease)    High blood pressure    High cholesterol    History of stomach ulcers    Osteoarthritis    PONV (postoperative nausea and vomiting)    SCC (squamous cell carcinoma)    Right dorsal hand    Squamous carcinoma    right cheek    Squamous cell carcinoma    lateral left cheek     Past Surgical History:   Procedure Laterality Date    Angioplasty (coronary)  2013    multiple angioplasties    Angioplasty (coronary)  2013 and 2017    Cath bare metal stent  (bms)      Cath drug eluting stent      Total knee replacement       Social History     Socioeconomic History    Marital status:      Spouse name: Not on file    Number of children: Not on file    Years of education: Not on file    Highest education level: Not on file   Occupational History    Not on file   Tobacco Use    Smoking status: Never     Passive exposure: Never    Smokeless tobacco: Never   Vaping Use    Vaping status: Never Used   Substance and Sexual Activity    Alcohol use: No     Alcohol/week: 0.0 standard drinks of alcohol    Drug use: No    Sexual activity: Not Currently   Other Topics Concern     Service Not Asked    Blood Transfusions Not Asked    Caffeine Concern Yes    Occupational Exposure Not Asked    Hobby Hazards Not Asked    Sleep Concern Not Asked    Stress Concern Not Asked    Weight Concern Not Asked    Special Diet Not Asked    Back Care Not Asked    Exercise Not Asked    Bike Helmet Not Asked    Seat Belt Not Asked    Self-Exams Not Asked    Grew up on a farm No    History of tanning Yes    Outdoor occupation No    Pt has a pacemaker No    Pt has a defibrillator No    Breast feeding Not Asked    Reaction to local anesthetic No   Social History Narrative    Not on file     Social Determinants of Health     Financial Resource Strain: Not on file   Food Insecurity: Not on file   Transportation Needs: Not on file   Physical Activity: Not on file   Stress: Not on file   Social Connections: Not on file   Housing Stability: Not on file     Family History   Problem Relation Age of Onset    Breast Cancer Mother 66    Hypertension Mother     Cancer Mother         Dx age 65- at 70    Stroke Father         CVA    Lung Disorder Father         lung disease    Heart Disease Father     Arthritis Daughter         rheumatoid    Other (benign brain lesion) Son     Breast Cancer Niece         60's       There were no vitals filed for this visit.    HPI:    Chief Complaint   Patient  presents with    Full Skin Exam     Established pt, presents for 6 months full body exam, denies any concerns. Personal hx of SCC     Follow-up history SCC AK's, recent biopsy wart right upper arm  Here with her daughter  Past notes/ records and appropriate/relevant lab results including pathology and past body maps reviewed. Including outside notes/ PCP notes as appropriate. Updated and new information noted in current visit.     Patient presents with concerns above.    Patient has been in their usual state of health.      History, medications, allergies reviewed as noted.      ROS:  new relevant systemic complaints as noted       Physical Examination:     Well-developed well-nourished patient alert oriented in no acute distress.  Exam performed, including scalp, head, neck, face,nails, hair, external eyes, including conjunctival mucosa, eyelids, lips external ears, back, chest,/ breasts, axillae,  abdomen, arms, legs, palms.     Multiple light to medium brown, well marginated, uniformly pigmented, macules and papules 6 mm and less scattered on exam. pigmented lesions examined with dermoscopy benign-appearing patterns.     Waxy tannish keratotic papules scattered, cherry-red vascular papules scattered.    See map today's date for lesions noted .      Otherwise remarkable for lesions as noted on map.  See details of examination  See Assessment /Plan for additional history and physical exam also:    Assessment / plan:    No orders of the defined types were placed in this encounter.      Meds & Refills for this Visit:  Requested Prescriptions      No prescriptions requested or ordered in this encounter         Encounter Diagnoses   Name Primary?    Actinic keratosis Yes    Seborrheic keratoses     Multiple melanocytic nevi     Benign neoplasm of skin, unspecified location     Solar lentigo     Encounter for follow-up surveillance of skin cancer        See details on map.      Remarkable for:  Epidermoid cyst right  cheek no recurrence post excision 10/23    Erythematous scaling keratotic papules noted at sites noted on map  Actinic Keratoses.  Precancerous nature discussed. Sun protection, sunscreen/ blocks encouraged Lesions treated with cryo- .  Biopsy if not resolved.    Over the nasal sidewall tipx3    Monitor smaller papule at right medial canthus nasal sidewall  Otherwise no significant changes      Right dorsal hand SCC post excision with Dr. Matthews no recurrence      Numerous benign keratoses reassurance lentigines.  Biopsy wart right upper arm no recurrence healing well.    History of AK's, SCC no recurrence SCC  As noted previously  In particular new keratoses over the dorsal hands arms  Extensive lentigines, generalized face arms chest legs waxy tan keratotic papules lesions in areas of concern as noted reassurance given.  Benign nature discussed.  Possibility of cryo, alphahydroxy acids over-the-counter retinol's discussed.  Continue monitoring sunscreen  No other susupicious lesions on todays  Exam.  Few scattered nevi benign-appearing monitor    More dermatitis curious stable continue moisturizers,    Please refer to map for specific lesions.  See additional diagnoses.  Pros cons of various therapies, risks benefits discussed.Pathophysiology discussed with patient.  Therapeutic options reviewed.  See  Medications in grid.  Instructions reviewed at length.    Benign nevi, seborrheic  keratoses, cherry angiomas:  Reassurance regarding other benign skin lesions.Signs and symptoms of skin cancer, ABCDE's of melanoma discussed with patient. Sunscreen use, sun protection, self exams reviewed.  Followup as noted RTC routine checkup 6 mos - one year or p.r.n.    Encounter Times Including precharting, reviewing chart, prior notes obtaining history: 10 minutes, medical exam :10 minutes, notes on body map, plan, counseling 10minutes My total time spent caring for the patient on the day of the encounter: 30 minutes     The  patient indicates understanding of these issues and agrees to the plan.  The patient is asked to return as noted in follow-up/ above.    This note was generated using Dragon voice recognition software.  Please contact me regarding any confusion resulting from errors in recognition.

## 2024-06-19 ENCOUNTER — OFFICE VISIT (OUTPATIENT)
Dept: FAMILY MEDICINE CLINIC | Facility: CLINIC | Age: 84
End: 2024-06-19

## 2024-06-19 VITALS
TEMPERATURE: 97 F | DIASTOLIC BLOOD PRESSURE: 52 MMHG | WEIGHT: 196.63 LBS | HEART RATE: 57 BPM | SYSTOLIC BLOOD PRESSURE: 128 MMHG | BODY MASS INDEX: 32.76 KG/M2 | OXYGEN SATURATION: 97 % | HEIGHT: 65 IN

## 2024-06-19 DIAGNOSIS — I46.9 CARDIAC ARREST (HCC): ICD-10-CM

## 2024-06-19 DIAGNOSIS — R41.3 MEMORY CHANGES: Primary | ICD-10-CM

## 2024-06-19 DIAGNOSIS — I10 ESSENTIAL HYPERTENSION: ICD-10-CM

## 2024-06-19 DIAGNOSIS — K86.9 PANCREATIC LESION (HCC): ICD-10-CM

## 2024-06-19 PROCEDURE — 99214 OFFICE O/P EST MOD 30 MIN: CPT | Performed by: STUDENT IN AN ORGANIZED HEALTH CARE EDUCATION/TRAINING PROGRAM

## 2024-06-19 PROCEDURE — G2211 COMPLEX E/M VISIT ADD ON: HCPCS | Performed by: STUDENT IN AN ORGANIZED HEALTH CARE EDUCATION/TRAINING PROGRAM

## 2024-06-19 NOTE — PROGRESS NOTES
HPI:    Patient ID: Bridgett Andino is a 84 year old female.    HPI  Pt presenting for follow-up. Daughter present for visit.     Doing well overall  Reports improved appetite since last visit  Hydrating well    Prior memory changes  Denies any significant issues or progressive symptoms  Still has difficulty with short-term recall, remembering names of restaurants at that moment  Denies any increased stress or mood changes  Denies SH/SI/HI    H/o pancreatic lesion  Followed by GI  Completed MR imaging, stable    No recent urinary symptoms      Review of Systems   A comprehensive 10 point review of systems was completed.  Pertinent positives and negatives noted in the the HPI.       Current Outpatient Medications   Medication Sig Dispense Refill    triamcinolone 0.1 % External Cream       CRANBERRY OR Take by mouth.      fluticasone propionate 50 MCG/ACT Nasal Suspension 1 spray by Nasal route daily. One spray per each nostril daily. 1 each 0    sucralfate 1 g Oral Tab Take 1 tablet (1 g total) by mouth 3 (three) times daily before meals.      pantoprazole 40 MG Oral Tab EC Take 1 tablet (40 mg total) by mouth every morning before breakfast. 90 tablet 3    amLODIPine besylate 10 MG Oral Tab Take 1 tablet (10 mg total) by mouth daily.      ezetimibe 10 MG Oral Tab Take 1 tablet (10 mg total) by mouth nightly.      Calcium Carbonate Antacid 500 MG Oral Chew Tab Chew 1 tablet (500 mg total) by mouth 3 (three) times daily as needed.  0    Atorvastatin Calcium 40 MG Oral Tab Take 1 tablet (40 mg total) by mouth daily.      Clopidogrel Bisulfate 75 MG Oral Tab Take 1 tablet (75 mg total) by mouth daily.      Metoprolol Succinate  MG Oral Tablet 24 Hr Take 1 tablet (100 mg total) by mouth daily.      aspirin 81 MG Oral Tab EC Take 1 tablet (81 mg total) by mouth daily.       Allergies:  Allergies   Allergen Reactions    Bactrim Ds UNKNOWN    Codeine UNKNOWN    Macrobid [Nitrofurantoin] UNKNOWN      Vitals:     06/19/24 1038   BP: 128/52   Pulse: 57   Temp: 97.1 °F (36.2 °C)   TempSrc: Temporal   SpO2: 97%   Weight: 196 lb 9.6 oz (89.2 kg)   Height: 5' 5\" (1.651 m)       Body mass index is 32.72 kg/m².   PHYSICAL EXAM:   Physical Exam  Vitals reviewed.   Constitutional:       General: She is not in acute distress.     Appearance: Normal appearance. She is well-developed.   HENT:      Head: Normocephalic and atraumatic.      Right Ear: Tympanic membrane, ear canal and external ear normal.      Left Ear: Tympanic membrane, ear canal and external ear normal.   Eyes:      Conjunctiva/sclera: Conjunctivae normal.   Neck:      Thyroid: No thyroid mass or thyroid tenderness.   Cardiovascular:      Rate and Rhythm: Normal rate and regular rhythm.      Pulses: Normal pulses.      Heart sounds: Normal heart sounds, S1 normal and S2 normal. No murmur heard.  Pulmonary:      Effort: Pulmonary effort is normal. No respiratory distress.      Breath sounds: Normal breath sounds. No wheezing, rhonchi or rales.   Abdominal:      General: Bowel sounds are normal.      Palpations: Abdomen is soft.      Tenderness: There is no abdominal tenderness. There is no guarding or rebound.   Musculoskeletal:      Cervical back: Normal range of motion and neck supple. No muscular tenderness.      Right lower leg: No edema.      Left lower leg: No edema.   Lymphadenopathy:      Cervical: No cervical adenopathy.   Skin:     General: Skin is warm and dry.      Coloration: Skin is not jaundiced.   Neurological:      General: No focal deficit present.      Mental Status: She is alert and oriented to person, place, and time. Mental status is at baseline.   Psychiatric:         Attention and Perception: Attention normal.         Mood and Affect: Mood normal.         Behavior: Behavior normal. Behavior is cooperative.         Cognition and Memory: Cognition normal.             ASSESSMENT/PLAN:   1. Memory changes  Stable  Will continue to monitor  Consider  Neuro vs NeuroPsych for any progressive symptoms  - discussed red flags for urgent reevaluation    2. Essential hypertension  In-office BP stable, pt otherwise asymptomatic  - discussed benefits of DASH diet and daily activity  - continue BP monitoring  - continue daily medication  - advised to call if BP is persistently elevated    3. Pancreatic lesion (HCC)  Recent imaging reviewed  Recent GI notes reviewed  Appetite improved from previous  Continue surveillance as recommended    4. Cardiac arrest (HCC)  Followed by Cardiology, last visit Dec 2023    Follow-up in 4 months for surveillance. Pt verbalized understanding and agrees with plan.      No orders of the defined types were placed in this encounter.      Meds This Visit:  Requested Prescriptions      No prescriptions requested or ordered in this encounter       Imaging & Referrals:  None         ID#[id number]

## 2024-07-09 ENCOUNTER — OFFICE VISIT (OUTPATIENT)
Dept: PODIATRY CLINIC | Facility: CLINIC | Age: 84
End: 2024-07-09
Payer: MEDICARE

## 2024-07-09 DIAGNOSIS — M21.611 BILATERAL BUNIONS: ICD-10-CM

## 2024-07-09 DIAGNOSIS — G60.9 IDIOPATHIC POLYNEUROPATHY: Primary | ICD-10-CM

## 2024-07-09 DIAGNOSIS — B35.1 ONYCHOMYCOSIS: ICD-10-CM

## 2024-07-09 DIAGNOSIS — L84 FOOT CALLUS: ICD-10-CM

## 2024-07-09 DIAGNOSIS — M21.612 BILATERAL BUNIONS: ICD-10-CM

## 2024-07-09 PROCEDURE — 99213 OFFICE O/P EST LOW 20 MIN: CPT | Performed by: STUDENT IN AN ORGANIZED HEALTH CARE EDUCATION/TRAINING PROGRAM

## 2024-07-09 NOTE — PROGRESS NOTES
[facility]  Progress Note      Bridgett Andino is a 84 year old female.   Chief Complaint   Patient presents with    Toenail Care     Consult -Pt here for callus care and nail trim. Pain when walking due to callus.              HPI:     Patient is a 84-year-old female presents to clinic today for bilateral foot evaluation.  Admits to elongated toenails and painful calluses. Pt is accompanied by her daughter    Allergies: Bactrim ds, Codeine, and Macrobid [nitrofurantoin]    Current Outpatient Medications   Medication Sig Dispense Refill    triamcinolone 0.1 % External Cream       CRANBERRY OR Take by mouth.      fluticasone propionate 50 MCG/ACT Nasal Suspension 1 spray by Nasal route daily. One spray per each nostril daily. 1 each 0    sucralfate 1 g Oral Tab Take 1 tablet (1 g total) by mouth 3 (three) times daily before meals.      pantoprazole 40 MG Oral Tab EC Take 1 tablet (40 mg total) by mouth every morning before breakfast. 90 tablet 3    amLODIPine besylate 10 MG Oral Tab Take 1 tablet (10 mg total) by mouth daily.      ezetimibe 10 MG Oral Tab Take 1 tablet (10 mg total) by mouth nightly.      Calcium Carbonate Antacid 500 MG Oral Chew Tab Chew 1 tablet (500 mg total) by mouth 3 (three) times daily as needed.  0    Atorvastatin Calcium 40 MG Oral Tab Take 1 tablet (40 mg total) by mouth daily.      Clopidogrel Bisulfate 75 MG Oral Tab Take 1 tablet (75 mg total) by mouth daily.      Metoprolol Succinate  MG Oral Tablet 24 Hr Take 1 tablet (100 mg total) by mouth daily.      aspirin 81 MG Oral Tab EC Take 1 tablet (81 mg total) by mouth daily.        Past Medical History:    Actinic keratoses    left jaw    Anesthesia complication    Cardiac arrest (HCC)    Cataract    Coronary atherosclerosis    Essential hypertension    GERD (gastroesophageal reflux disease)    High blood pressure    High cholesterol    History of stomach ulcers    Osteoarthritis    PONV (postoperative nausea  and vomiting)    SCC (squamous cell carcinoma)    Right dorsal hand    Squamous carcinoma    right cheek    Squamous cell carcinoma    lateral left cheek      Past Surgical History:   Procedure Laterality Date    Angioplasty (coronary)      multiple angioplasties    Angioplasty (coronary)   and 2017    Cath bare metal stent (bms)      Cath drug eluting stent      Total knee replacement        Family History   Problem Relation Age of Onset    Breast Cancer Mother 66    Hypertension Mother     Cancer Mother         Dx age 65- at 70    Stroke Father         CVA    Lung Disorder Father         lung disease    Heart Disease Father     Arthritis Daughter         rheumatoid    Other (benign brain lesion) Son     Breast Cancer Niece         60's      Social History     Socioeconomic History    Marital status:    Tobacco Use    Smoking status: Never     Passive exposure: Never    Smokeless tobacco: Never   Vaping Use    Vaping status: Never Used   Substance and Sexual Activity    Alcohol use: No     Alcohol/week: 0.0 standard drinks of alcohol    Drug use: No    Sexual activity: Not Currently   Other Topics Concern    Caffeine Concern Yes    Grew up on a farm No    History of tanning Yes    Outdoor occupation No    Pt has a pacemaker No    Pt has a defibrillator No    Reaction to local anesthetic No           REVIEW OF SYSTEMS:     Denies nause, fever, chills  No calf pain  Denies chest pain or SOB      EXAM:   There were no vitals taken for this visit.  GENERAL: well developed, well nourished, in no apparent distress  EXTREMITIES:   1. Integument: Normal skin temperature and turgor. Toenails x10 are elongated. HPK to kenrick plantar 1st metatarsal     2. Vascular: Dorsalis pedis two out of four bilateral and posterior tibial pulses two out of   four bilateral, capillary refill normal.   3. Musculoskeletal: All muscle groups are graded 5 out of 5 in the foot and ankle.   4. Neurological: Normal sharp dull  sensation; reflexes normal.             ASSESSMENT AND PLAN:   Diagnoses and all orders for this visit:    Idiopathic polyneuropathy    Bilateral bunions    Foot callus    Onychomycosis        Plan:       -Patient examined, chart history reviewed.  -Discussed importance of proper pedal hygiene, regular foot checks, and tight glucose control.  -Sharply debrided nails x10 with a sterile nail nipper achieving a 20% reduction in thickness and length, without incident.   -Ambulate with supportive shoes and inserts and avoid walking barefoot.  -Educated patient on acute signs of infection advised patient to seek immediate medical attention if symptoms arise.    RTC in 9 weeks    The patient indicates understanding of these issues and agrees to the plan.        Opal Lee DPM

## 2024-10-03 ENCOUNTER — HOSPITAL ENCOUNTER (OUTPATIENT)
Age: 84
Discharge: HOME | End: 2024-10-03
Attending: EMERGENCY MEDICINE
Payer: MEDICARE

## 2024-10-03 ENCOUNTER — APPOINTMENT (OUTPATIENT)
Dept: CT IMAGING | Age: 84
End: 2024-10-03
Attending: EMERGENCY MEDICINE
Payer: MEDICARE

## 2024-10-03 VITALS
DIASTOLIC BLOOD PRESSURE: 60 MMHG | HEART RATE: 76 BPM | RESPIRATION RATE: 20 BRPM | OXYGEN SATURATION: 95 % | SYSTOLIC BLOOD PRESSURE: 138 MMHG | TEMPERATURE: 97 F

## 2024-10-03 DIAGNOSIS — M19.90 OSTEOARTHRITIS, UNSPECIFIED OSTEOARTHRITIS TYPE, UNSPECIFIED SITE: ICD-10-CM

## 2024-10-03 DIAGNOSIS — M54.59 INTRACTABLE LOW BACK PAIN: Primary | ICD-10-CM

## 2024-10-03 PROCEDURE — 72131 CT LUMBAR SPINE W/O DYE: CPT | Performed by: EMERGENCY MEDICINE

## 2024-10-03 PROCEDURE — 99214 OFFICE O/P EST MOD 30 MIN: CPT

## 2024-10-03 RX ORDER — CYCLOBENZAPRINE HCL 10 MG
10 TABLET ORAL 3 TIMES DAILY PRN
Qty: 10 TABLET | Refills: 0 | Status: SHIPPED | OUTPATIENT
Start: 2024-10-03 | End: 2024-10-10

## 2024-10-03 NOTE — ED INITIAL ASSESSMENT (HPI)
R lower back pain for 3 weeks worse in the past week, no urinary symptoms, non radiating, no weakness to legs ,no abd pain, no trauma or injury, no rash

## 2024-10-03 NOTE — ED PROVIDER NOTES
Patient Seen in: [facility]      History     Chief Complaint   Patient presents with    Back Pain     Stated Complaint: back pain    Subjective:   HPI    Patient is an 84-year-old female who presents with right lower back pain x 4 to 5 days.  She states that she has been feeling \"blah\" for the last few weeks.  Denies any fevers or dysuria.  Denies any radiation of the pain.  She does get relief with a heating pad and it does seem to be exacerbated by movement.  She denies any heavy lifting or trauma.    Objective:     No pertinent past medical history.            No pertinent past surgical history.              No pertinent social history.            Review of Systems    Positive for stated complaint: back pain  Other systems are as noted in HPI.  Constitutional and vital signs reviewed.      All other systems reviewed and negative except as noted above.    Physical Exam     ED Triage Vitals [10/03/24 1308]   /60   Pulse 76   Resp 20   Temp 97.3 °F (36.3 °C)   Temp src Temporal   SpO2 95 %   O2 Device None (Room air)       Current Vitals:   Vital Signs  BP: 138/60  Pulse: 76  Resp: 20  Temp: 97.3 °F (36.3 °C)  Temp src: Temporal    Oxygen Therapy  SpO2: 95 %  O2 Device: None (Room air)        Physical Exam  GENERAL: No acute distress, awake and alert  HEENT: EOMI, PERRL  Neck: supple  Back: TTP in right paralumbar musculature. Straight leg raise negative  Extremities: FROM of all extremities, no edema  Neuro: CN intact, normal speech, 5/5 motor strength in all extremities, no focal deficits  SKIN: warm, dry, no rashes      ED Course   Labs Reviewed - No data to display       MDM     Medical Decision Making  Ddx: muscle spasm, spinal stenosis, herniated disc    Pt notified of CT results and plan for care at home     Amount and/or Complexity of Data Reviewed  Radiology: ordered.        Disposition and Plan     Clinical Impression:  1. Intractable low back pain    2. Osteoarthritis, unspecified  osteoarthritis type, unspecified site         Disposition:  Discharge  10/3/2024  2:03 pm    Follow-up:  Mary Jane Torre MD  [address] [place] [id number]  [phone]                Medications Prescribed:  Current Discharge Medication List              Supplementary Documentation:

## 2024-10-23 ENCOUNTER — OFFICE VISIT (OUTPATIENT)
Dept: PODIATRY CLINIC | Facility: CLINIC | Age: 84
End: 2024-10-23
Payer: MEDICARE

## 2024-10-23 DIAGNOSIS — B35.1 ONYCHOMYCOSIS OF MULTIPLE TOENAILS WITH TYPE 2 DIABETES MELLITUS (HCC): Primary | ICD-10-CM

## 2024-10-23 DIAGNOSIS — E11.69 ONYCHOMYCOSIS OF MULTIPLE TOENAILS WITH TYPE 2 DIABETES MELLITUS (HCC): Primary | ICD-10-CM

## 2024-10-23 DIAGNOSIS — L97.511 DIABETIC ULCER OF TOE OF RIGHT FOOT ASSOCIATED WITH DIABETES MELLITUS DUE TO UNDERLYING CONDITION, LIMITED TO BREAKDOWN OF SKIN (HCC): ICD-10-CM

## 2024-10-23 DIAGNOSIS — L60.0 INGROWN TOENAIL OF BOTH FEET: ICD-10-CM

## 2024-10-23 DIAGNOSIS — E08.621 DIABETIC ULCER OF TOE OF RIGHT FOOT ASSOCIATED WITH DIABETES MELLITUS DUE TO UNDERLYING CONDITION, LIMITED TO BREAKDOWN OF SKIN (HCC): ICD-10-CM

## 2024-10-23 PROCEDURE — 11721 DEBRIDE NAIL 6 OR MORE: CPT | Performed by: PODIATRIST

## 2024-10-23 PROCEDURE — 99214 OFFICE O/P EST MOD 30 MIN: CPT | Performed by: PODIATRIST

## 2024-10-23 NOTE — PROGRESS NOTES
Reason for Visit      Bridgett Andino is a 84 year old female presents today complaining of bilateral foot pain.     History of Present Illness     Patient presents to clinic today after last being seen by podiatry on 7/9/2024 for routine footcare.  Patient does have a diagnosis of idiopathic polyneuropathy and does have bilateral bunion deformities.  Patient is unable to debride her calluses are nails herself due to physical limitations and concern with neuropathy.  Patient denies any open lesions at this time.  Patient does have a history of cardiovascular disease status post carotid angioplasty as well as peripheral arterial disease.    The following portions of the patient's history were reviewed and updated as appropriate: allergies, current medications, past family history, past medical history, past social history, past surgical history and problem list.    Allergies[1]      Current Outpatient Medications:     triamcinolone 0.1 % External Cream, , Disp: , Rfl:     CRANBERRY OR, Take by mouth., Disp: , Rfl:     fluticasone propionate 50 MCG/ACT Nasal Suspension, 1 spray by Nasal route daily. One spray per each nostril daily., Disp: 1 each, Rfl: 0    sucralfate 1 g Oral Tab, Take 1 tablet (1 g total) by mouth 3 (three) times daily before meals., Disp: , Rfl:     pantoprazole 40 MG Oral Tab EC, Take 1 tablet (40 mg total) by mouth every morning before breakfast., Disp: 90 tablet, Rfl: 3    amLODIPine besylate 10 MG Oral Tab, Take 1 tablet (10 mg total) by mouth daily., Disp: , Rfl:     ezetimibe 10 MG Oral Tab, Take 1 tablet (10 mg total) by mouth nightly., Disp: , Rfl:     Calcium Carbonate Antacid 500 MG Oral Chew Tab, Chew 1 tablet (500 mg total) by mouth 3 (three) times daily as needed., Disp: , Rfl: 0    Atorvastatin Calcium 40 MG Oral Tab, Take 1 tablet (40 mg total) by mouth daily., Disp: , Rfl:     Clopidogrel Bisulfate 75 MG Oral Tab, Take 1 tablet (75 mg total) by mouth daily., Disp: , Rfl:      Metoprolol Succinate  MG Oral Tablet 24 Hr, Take 1 tablet (100 mg total) by mouth daily., Disp: , Rfl:     aspirin 81 MG Oral Tab EC, Take 1 tablet (81 mg total) by mouth daily., Disp: , Rfl:     There are no discontinued medications.    Patient Active Problem List   Diagnosis    Actinic keratosis    Inflamed seborrheic keratosis    Personal history of skin cancer    Acute chest pain    Sun-damaged skin    Exertional chest pain    FEDERICO (acute kidney injury) (HCC)    Dermatitis    Neoplasm of uncertain behavior of skin    Recurrent UTI    Atherosclerosis of coronary artery    Benign neoplasm of scalp and skin of neck    Benign neoplasm of skin of upper limb, including shoulder    Cardiac arrest (HCC)    Dyslipidemia    Essential hypertension    Ulcer of other part of lower limb       Past Medical History:    Actinic keratoses    left jaw    Anesthesia complication    Cardiac arrest (HCC)    Cataract    Coronary atherosclerosis    Essential hypertension    GERD (gastroesophageal reflux disease)    High blood pressure    High cholesterol    History of stomach ulcers    Osteoarthritis    PONV (postoperative nausea and vomiting)    SCC (squamous cell carcinoma)    Right dorsal hand    Squamous carcinoma    right cheek    Squamous cell carcinoma    lateral left cheek       Past Surgical History:   Procedure Laterality Date    Angioplasty (coronary)      multiple angioplasties    Angioplasty (coronary)   and     Cath bare metal stent (bms)      Cath drug eluting stent      Total knee replacement         Family History   Problem Relation Age of Onset    Breast Cancer Mother 66    Hypertension Mother     Cancer Mother         Dx age 65- at 70    Stroke Father         CVA    Lung Disorder Father         lung disease    Heart Disease Father     Arthritis Daughter         rheumatoid    Other (benign brain lesion) Son     Breast Cancer Niece         60's       Social History     Occupational History    Not  on file   Tobacco Use    Smoking status: Never     Passive exposure: Never    Smokeless tobacco: Never   Vaping Use    Vaping status: Never Used   Substance and Sexual Activity    Alcohol use: No     Alcohol/week: 0.0 standard drinks of alcohol    Drug use: No    Sexual activity: Not Currently       ROS     Constitutional: negative for chills, fevers and sweats  Gastrointestinal: negative for abdominal pain, diarrhea, nausea and vomiting  Genitourinary:negative for dysuria and hematuria  Musculoskeletal:negative for arthralgias and muscle weakness  Neurological: negative for paresthesia and weakness  All others reviewed and negative.      Physical Exam     LE PHYSICAL EXAM    Constitution: Well-developed and well-nourished. Gait appears normal. No apparent distress. Alert and oriented to person, place, and time.  Integument: There are no varicosities. Skin appears moist, warm, and supple with positive hair growth. There are no color changes. No open lesions. No macerations, No Hyperkeratotic lesions.  Vascular examination: Dorsalis pedis and posterior tibial pulses are strong bilaterally with capillary filling time less than 3 seconds to all digits. There is no peripheral edema..  Neurological Sensorium: Grossly intact to sharp/dull. Vibratory: Intact.  Musculoskeletal:   5/5 pedal muscle strength b/l     Advanced trophic changes as: hair growth (decrease or absence) nail changes  (thickening) pigmentary changes (discoloration) skin texture (thin, shiny)          Procedure     .  Nails 1 through 5 bilateral debrided with use of a nail nipper.  Patient Toller procedure well had no complications.  Neurovascular status intact to the level of the digits post procedure.     Assessment and Plan     Encounter Diagnoses   Name Primary?    Onychomycosis of multiple toenails with type 2 diabetes mellitus (HCC) Yes    Diabetic ulcer of toe of right foot associated with diabetes mellitus due to underlying condition, limited to  breakdown of skin (HCC)     Ingrown toenail of both feet        -Patient examined, chart history reviewed.  -Discussed importance of proper pedal hygiene, regular foot checks, and tight glucose control.  -Sharply debrided nails x10 with a sterile nail nipper achieving a 20% reduction in thickness and length, without incident.   -Ambulate with supportive shoes and inserts and avoid walking barefoot.  -Educated patient on acute signs of infection advised patient to seek immediate medical attention if symptoms arise.       Patient was instructed to call the office or on-call podiatric physician immediately with any issues or concerns before the next scheduled visit. Patient to follow-up in clinic in [address] [place] [address]      Eli Comer DPM, D.JANINE FACYEISON  Diplomat, American Board of Foot and Ankle Surgery  Certified in Foot and Rearfoot/Ankle Reconstruction  Fellow of the American College of Foot and Ankle Surgeons  Fellowship Trained Foot and Ankle Surgeon   [facility]     10/23/2024    7:13 AM         [1]   Allergies  Allergen Reactions    Bactrim Ds UNKNOWN    Codeine UNKNOWN    Macrobid [Nitrofurantoin] UNKNOWN

## 2024-10-25 ENCOUNTER — LAB ENCOUNTER (OUTPATIENT)
Dept: LAB | Age: 84
End: 2024-10-25
Attending: STUDENT IN AN ORGANIZED HEALTH CARE EDUCATION/TRAINING PROGRAM
Payer: MEDICARE

## 2024-10-25 ENCOUNTER — OFFICE VISIT (OUTPATIENT)
Dept: FAMILY MEDICINE CLINIC | Facility: CLINIC | Age: 84
End: 2024-10-25
Payer: MEDICARE

## 2024-10-25 VITALS
DIASTOLIC BLOOD PRESSURE: 70 MMHG | SYSTOLIC BLOOD PRESSURE: 145 MMHG | TEMPERATURE: 98 F | WEIGHT: 196 LBS | HEIGHT: 65 IN | OXYGEN SATURATION: 95 % | BODY MASS INDEX: 32.65 KG/M2 | HEART RATE: 62 BPM

## 2024-10-25 DIAGNOSIS — N17.9 AKI (ACUTE KIDNEY INJURY) (HCC): ICD-10-CM

## 2024-10-25 DIAGNOSIS — I10 ESSENTIAL HYPERTENSION: ICD-10-CM

## 2024-10-25 DIAGNOSIS — K86.9 PANCREATIC LESION (HCC): ICD-10-CM

## 2024-10-25 DIAGNOSIS — Z00.00 ENCOUNTER FOR ANNUAL HEALTH EXAMINATION: Primary | ICD-10-CM

## 2024-10-25 DIAGNOSIS — E78.5 DYSLIPIDEMIA: ICD-10-CM

## 2024-10-25 DIAGNOSIS — Z13.6 SCREENING FOR CARDIOVASCULAR CONDITION: ICD-10-CM

## 2024-10-25 DIAGNOSIS — N39.0 RECURRENT UTI: ICD-10-CM

## 2024-10-25 DIAGNOSIS — H91.93 HEARING IMPAIRED PERSON, BILATERAL: ICD-10-CM

## 2024-10-25 DIAGNOSIS — Z13.1 SCREENING FOR DIABETES MELLITUS (DM): ICD-10-CM

## 2024-10-25 DIAGNOSIS — H65.01 RIGHT ACUTE SEROUS OTITIS MEDIA, RECURRENCE NOT SPECIFIED: ICD-10-CM

## 2024-10-25 DIAGNOSIS — Z85.828 PERSONAL HISTORY OF SKIN CANCER: ICD-10-CM

## 2024-10-25 DIAGNOSIS — R05.1 ACUTE COUGH: ICD-10-CM

## 2024-10-25 LAB
ALBUMIN SERPL-MCNC: 4.3 G/DL (ref 3.2–4.8)
ALBUMIN/GLOB SERPL: 1.5 {RATIO} (ref 1–2)
ALP LIVER SERPL-CCNC: 153 U/L
ALT SERPL-CCNC: 16 U/L
ANION GAP SERPL CALC-SCNC: 6 MMOL/L (ref 0–18)
AST SERPL-CCNC: 19 U/L (ref ?–34)
BASOPHILS # BLD AUTO: 0.06 X10(3) UL (ref 0–0.2)
BASOPHILS NFR BLD AUTO: 0.6 %
BILIRUB SERPL-MCNC: 0.5 MG/DL (ref 0.2–1.1)
BUN BLD-MCNC: 18 MG/DL (ref 9–23)
BUN/CREAT SERPL: 14.3 (ref 10–20)
CALCIUM BLD-MCNC: 9.2 MG/DL (ref 8.7–10.4)
CHLORIDE SERPL-SCNC: 108 MMOL/L (ref 98–112)
CHOLEST SERPL-MCNC: 150 MG/DL (ref ?–200)
CO2 SERPL-SCNC: 27 MMOL/L (ref 21–32)
CREAT BLD-MCNC: 1.26 MG/DL
DEPRECATED RDW RBC AUTO: 49.3 FL (ref 35.1–46.3)
EGFRCR SERPLBLD CKD-EPI 2021: 42 ML/MIN/1.73M2 (ref 60–?)
EOSINOPHIL # BLD AUTO: 0.48 X10(3) UL (ref 0–0.7)
EOSINOPHIL NFR BLD AUTO: 4.9 %
ERYTHROCYTE [DISTWIDTH] IN BLOOD BY AUTOMATED COUNT: 16.4 % (ref 11–15)
EST. AVERAGE GLUCOSE BLD GHB EST-MCNC: 140 MG/DL (ref 68–126)
FASTING PATIENT LIPID ANSWER: NO
FASTING STATUS PATIENT QL REPORTED: NO
GLOBULIN PLAS-MCNC: 2.9 G/DL (ref 2–3.5)
GLUCOSE BLD-MCNC: 125 MG/DL (ref 70–99)
HBA1C MFR BLD: 6.5 % (ref ?–5.7)
HCT VFR BLD AUTO: 44.1 %
HDLC SERPL-MCNC: 48 MG/DL (ref 40–59)
HGB BLD-MCNC: 13.3 G/DL
IMM GRANULOCYTES # BLD AUTO: 0.03 X10(3) UL (ref 0–1)
IMM GRANULOCYTES NFR BLD: 0.3 %
LDLC SERPL CALC-MCNC: 77 MG/DL (ref ?–100)
LYMPHOCYTES # BLD AUTO: 2.27 X10(3) UL (ref 1–4)
LYMPHOCYTES NFR BLD AUTO: 23 %
MCH RBC QN AUTO: 25 PG (ref 26–34)
MCHC RBC AUTO-ENTMCNC: 30.2 G/DL (ref 31–37)
MCV RBC AUTO: 83.1 FL
MONOCYTES # BLD AUTO: 0.83 X10(3) UL (ref 0.1–1)
MONOCYTES NFR BLD AUTO: 8.4 %
NEUTROPHILS # BLD AUTO: 6.18 X10 (3) UL (ref 1.5–7.7)
NEUTROPHILS # BLD AUTO: 6.18 X10(3) UL (ref 1.5–7.7)
NEUTROPHILS NFR BLD AUTO: 62.8 %
NONHDLC SERPL-MCNC: 102 MG/DL (ref ?–130)
OSMOLALITY SERPL CALC.SUM OF ELEC: 295 MOSM/KG (ref 275–295)
PLATELET # BLD AUTO: 446 10(3)UL (ref 150–450)
POTASSIUM SERPL-SCNC: 4.8 MMOL/L (ref 3.5–5.1)
PROT SERPL-MCNC: 7.2 G/DL (ref 5.7–8.2)
RBC # BLD AUTO: 5.31 X10(6)UL
SODIUM SERPL-SCNC: 141 MMOL/L (ref 136–145)
TRIGL SERPL-MCNC: 143 MG/DL (ref 30–149)
TSI SER-ACNC: 4.67 MIU/ML (ref 0.55–4.78)
VLDLC SERPL CALC-MCNC: 22 MG/DL (ref 0–30)
WBC # BLD AUTO: 9.9 X10(3) UL (ref 4–11)

## 2024-10-25 PROCEDURE — 85025 COMPLETE CBC W/AUTO DIFF WBC: CPT

## 2024-10-25 PROCEDURE — 80053 COMPREHEN METABOLIC PANEL: CPT

## 2024-10-25 PROCEDURE — 84443 ASSAY THYROID STIM HORMONE: CPT

## 2024-10-25 PROCEDURE — 80061 LIPID PANEL: CPT

## 2024-10-25 PROCEDURE — 36415 COLL VENOUS BLD VENIPUNCTURE: CPT

## 2024-10-25 PROCEDURE — 83036 HEMOGLOBIN GLYCOSYLATED A1C: CPT

## 2024-10-25 RX ORDER — CYCLOBENZAPRINE HCL 10 MG
TABLET ORAL
COMMUNITY
Start: 2024-10-10

## 2024-10-25 RX ORDER — AZITHROMYCIN 250 MG/1
TABLET, FILM COATED ORAL
Qty: 6 TABLET | Refills: 0 | Status: SHIPPED | OUTPATIENT
Start: 2024-10-25 | End: 2024-10-29

## 2024-10-25 NOTE — PROGRESS NOTES
Subjective:   Bridgett Andino is a 84 year old female who presents for a Medicare Subsequent Annual Wellness visit (Pt already had Initial Annual Wellness) and scheduled follow up of multiple significant but stable problems    Pt presenting for routine physical and annual medicare wellness check. Daughter present for visit. Chronic problems as below. Past medical/surgical history, family history, and social history were reviewed. Diet and exercise have been fair. Medicare screening questions per nurse were reviewed. Pt requesting annual testing and med refills.     Reports onset of sore throat 10/21 with mild cough  Endorses brief tactile fever?  Reports steady appetite although increased fatigue  Denies chest pain, dyspnea  Feels somewhat better today      History/Other:   Fall Risk Assessment:   She has been screened for Falls and is High Risk. Fall Prevention information provided to patient in After Visit Summary.    Do you feel unsteady when standing or walking?: (Patient-Rptd) Yes  Do you worry about falling?: (Patient-Rptd) Yes  Have you fallen in the past year?: (Patient-Rptd) No     Cognitive Assessment:   She had a completely normal cognitive assessment - see flowsheet entries     Functional Ability/Status:   Bridgett Andino has some abnormal functions as listed below:  She has Dressing and/or Bathing issues based on screening of functional status.  Difficulty dressing or bathing?: (Patient-Rptd) Yes  Bathing or Showering: (Patient-Rptd) Able without help  Dressing: (Patient-Rptd) Able without help  She has Driving difficulties based on screening of functional status. She has Meal Preparation difficulties based on screening of functional status.She has difficulties Shopping for Groceries based on screening of functional status. She has Hearing problems based on screening of functional status.She has Vision problems based on screening of functional status. She has Walking problems based on  screening of functional status. She has problems with Daily Activities based on screening of functional status. She has problems with Memory based on screening of functional status.       Depression Screening (PHQ):  PHQ-2 SCORE: 0  , done 10/25/2024        <5 minutes spent screening and counseling for depression    Advanced Directives:   She has a Living Will on file in [facility]; reviewed and discussed documents with patient (and family/surrogate if present).  She has a Power of  for Health Care on file in [facility].  Patient has Advance Care Planning documents present in EMR. Reviewed documents with patient (and family/surrogate if present).      Patient Active Problem List   Diagnosis    Actinic keratosis    Inflamed seborrheic keratosis    Personal history of skin cancer    Acute chest pain    Sun-damaged skin    Exertional chest pain    FEDERICO (acute kidney injury) (HCC)    Dermatitis    Neoplasm of uncertain behavior of skin    Recurrent UTI    Atherosclerosis of coronary artery    Benign neoplasm of scalp and skin of neck    Benign neoplasm of skin of upper limb, including shoulder    Cardiac arrest (HCC)    Dyslipidemia    Essential hypertension    Ulcer of other part of lower limb     Allergies:  She is allergic to bactrim ds, codeine, and macrobid [nitrofurantoin].    Current Medications:  Outpatient Medications Marked as Taking for the 10/25/24 encounter (Office Visit) with Mary Jane Torre MD   Medication Sig    cyclobenzaprine 10 MG Oral Tab 1 tablet at bedtime as needed Orally Once a day for 30 days    [] azithromycin 250 MG Oral Tab Take 2 tablets (500 mg total) by mouth daily for 1 day, THEN 1 tablet (250 mg total) daily for 4 days.    triamcinolone 0.1 % External Cream     CRANBERRY OR Take by mouth.    fluticasone propionate 50 MCG/ACT Nasal Suspension 1 spray by Nasal route daily. One spray per each nostril daily.    sucralfate 1 g Oral Tab Take 1 tablet (1 g total) by mouth 3 (three)  times daily before meals.    pantoprazole 40 MG Oral Tab EC Take 1 tablet (40 mg total) by mouth every morning before breakfast.    amLODIPine besylate 10 MG Oral Tab Take 1 tablet (10 mg total) by mouth daily.    ezetimibe 10 MG Oral Tab Take 1 tablet (10 mg total) by mouth nightly.    Calcium Carbonate Antacid 500 MG Oral Chew Tab Chew 1 tablet (500 mg total) by mouth 3 (three) times daily as needed.    Atorvastatin Calcium 40 MG Oral Tab Take 1 tablet (40 mg total) by mouth daily.    Clopidogrel Bisulfate 75 MG Oral Tab Take 1 tablet (75 mg total) by mouth daily.    Metoprolol Succinate  MG Oral Tablet 24 Hr Take 1 tablet (100 mg total) by mouth daily.    aspirin 81 MG Oral Tab EC Take 1 tablet (81 mg total) by mouth daily.       Medical History:  She  has a past medical history of Actinic keratoses (2013), Anesthesia complication, Cardiac arrest (HCC) (12/02/2019), Cataract, Coronary atherosclerosis, Essential hypertension, GERD (gastroesophageal reflux disease), High blood pressure, High cholesterol, History of stomach ulcers, Osteoarthritis, PONV (postoperative nausea and vomiting), SCC (squamous cell carcinoma) (04/2023), Squamous carcinoma (2001), and Squamous cell carcinoma (1997).  Surgical History:  She  has a past surgical history that includes angioplasty (coronary) (2013); cath bare metal stent (bms); cath drug eluting stent; total knee replacement; and angioplasty (coronary) (2013 and 2017).   Family History:  Her family history includes Arthritis in her daughter; Breast Cancer in her niece; Breast Cancer (age of onset: 66) in her mother; Cancer in her mother; Heart Disease in her father; Hypertension in her mother; Lung Disorder in her father; Stroke in her father; benign brain lesion in her son.  Social History:  She  reports that she has never smoked. She has never been exposed to tobacco smoke. She has never used smokeless tobacco. She reports that she does not drink alcohol and does not use  drugs.    Tobacco:  She has never smoked tobacco.    CAGE Alcohol Screen:   CAGE screening score of 0 on 10/24/2024, showing low risk of alcohol abuse.      Patient Care Team:  Mary Jane Torre MD as PCP - General (Family Medicine)  Jed Chávez MD (GASTROENTEROLOGY)  Marina Ponce APRN (OBSTETRICS & GYNECOLOGY)    Review of Systems     Negative except throat irritation    Objective:   Physical Exam  General appearance: alert, appears stated age, and cooperative  Head: Normocephalic, without obvious abnormality, atraumatic  Eyes: negative  Ears: abnormal TM right ear - serous middle ear fluid  Nose:  mild turbinate swelling  Throat: abnormal findings: erythema of pharynx no exudates  Neck: no adenopathy, supple, symmetrical, trachea midline, and thyroid not enlarged, symmetric, no tenderness/mass/nodules  Back: negative  Lungs:  scant rhonchi at end expiration  Breasts:   deferred  Heart: S1, S2 normal, regular rate and rhythm  Abdomen: soft, non-tender; bowel sounds normal; no masses,  no organomegaly  Pelvic: deferred  Extremities: extremities normal, atraumatic, no cyanosis or edema  Pulses: 2+ and symmetric  Skin: Skin color, texture, turgor normal. No rashes or lesions  Lymph nodes:  negative  Neurologic: Grossly normal    /70   Pulse 62   Temp 97.8 °F (36.6 °C) (Temporal)   Ht 5' 5\" (1.651 m)   Wt 196 lb (88.9 kg)   SpO2 95%   BMI 32.62 kg/m²  Estimated body mass index is 32.62 kg/m² as calculated from the following:    Height as of this encounter: 5' 5\" (1.651 m).    Weight as of this encounter: 196 lb (88.9 kg).    Medicare Hearing Assessment:   Hearing Screening    Screening Method: Questionnaire  I have a problem hearing over the telephone: Sometimes I have trouble following the conversations when two or more people are talking at the same time: Yes   I have trouble understanding things on the TV: No I have to strain to understand conversations: Yes   I have to worry about missing  the telephone ring or doorbell: No I have trouble hearing conversations in a noisy background such as a crowded room or restaurant: Yes   I get confused about where sounds come from: No I misunderstand some words in a sentence and need to ask people to repeat themselves: Yes   I especially have trouble understanding the speech of women and children: Yes I have trouble understanding the speaker in a large room such as at a meeting or place of L[employer]: Yes   Many people I talk to seem to mumble (or don't speak clearly): Sometimes People get annoyed because I misunderstand what they say: Sometimes   I misunderstand what others are saying and make inappropriate responses: No I avoid social activities because I cannot hear well and fear I will reply improperly: No   Family members and friends have told me they think I may have hearing loss: Yes                   Assessment & Plan:   Bridgett Andino is a 84 year old female who presents for a Medicare Assessment.     1. Encounter for annual health examination (Primary)  Healthy physical exam. Advised to exercise regularly, monitor diet and weight, and follow-up as directed. Will check labs. Continue current medications. Regular follow-up with Podiatry, Derm. Annual Medicare physical.  Discussed preventative screenings  - will check labs as below  - encouraged to continue diet/exercise for overall wellness  - follow-up with eye doctor annually  - follow-up with dentist every 6 months  - return yearly for physicals  - annual flu shot  2. FEDERICO (acute kidney injury) (HCC)  Advised to continue to keep well-hydrated and avoid NSAIDs like Advil, Aleve, ibuprofen, naproxen.  May take only Tylenol arthritis if needed for aches and pains.  No chest pain, palpitations, shortness of breath or lower extremity edema at this time.  3. Essential hypertension  In-office BP elevated, home BP readings wnl  - discussed benefits of DASH diet and daily activity  - continue BP  monitoring  - continue daily medication  - will check labwork  - advised to call if BP is persistently elevated  -     Comp Metabolic Panel (14); Future; Expected date: 10/25/2024  -     TSH W Reflex To Free T4; Future; Expected date: 10/25/2024  -     CBC With Differential With Platelet; Future; Expected date: 10/25/2024  4. Dyslipidemia  - continue statin dosing  5. Recurrent UTI  Stable, continue supplements, CTM  6. Personal history of skin cancer  Followed by Derm  7. Pancreatic lesion (HCC)  Stable, last imaging April 2024  8. Screening for diabetes mellitus (DM)  Will check labs  -     Hemoglobin A1C; Future; Expected date: 10/25/2024  9. Screening for cardiovascular condition  -     Lipid Panel; Future; Expected date: 10/25/2024  10. Hearing impaired person, bilateral  Follow-up with Audiology  -     Audiology Referral - External  11. Right acute serous otitis media, recurrence not specified  - demonstrated how to administer Flonase medication  - avoid triggers as able  - increase fluid hydration and rest as tolerated  - to call with any questions or concerns  -     Azithromycin; Take 2 tablets (500 mg total) by mouth daily for 1 day, THEN 1 tablet (250 mg total) daily for 4 days.  Dispense: 6 tablet; Refill: 0  12. Acute cough  Discussed plan to start abx for persistent/progressive symptoms  -     Azithromycin; Take 2 tablets (500 mg total) by mouth daily for 1 day, THEN 1 tablet (250 mg total) daily for 4 days.  Dispense: 6 tablet; Refill: 0  Other orders  -     Cancel: Microalb/Creat Ratio, Random Urine; Future; Expected date: 10/25/2024  The patient indicates understanding of these issues and agrees to the plan.        Return in 4 months (on 2/25/2025).     Mary Jane Torre MD, 10/25/2024     Supplementary Documentation:   General Health:  In the past six months, have you lost more than 10 pounds without trying?: (Patient-Rptd) 2 - No  Has your appetite been poor?: (Patient-Rptd) No  Type of Diet:  (Patient-Rptd) Balanced  How does the patient maintain a good energy level?: (Patient-Rptd) Other  How would you describe your daily physical activity?: (Patient-Rptd) Light  How would you describe your current health state?: (Patient-Rptd) Good  How do you maintain positive mental well-being?: (Patient-Rptd) Social Interaction;Games;Visiting Friends;Visiting Family  On a scale of 0 to 10, with 0 being no pain and 10 being severe pain, what is your pain level?: (Patient-Rptd) 2 - (Mild)  In the past six months, have you experienced urine leakage?: (Patient-Rptd) 1-Yes  At any time do you feel concerned for the safety/well-being of yourself and/or your children, in your home or elsewhere?: No  Have you had any immunizations at another office such as Influenza, Hepatitis B, Tetanus, or Pneumococcal?: (Patient-Rptd) No    Health Maintenance   Topic Date Due    Diabetes Care Foot Exam  Never done    Diabetes Care A1C  Never done    Diabetes Care Dilated Eye Exam  Never done    Diabetes Care: Microalb/Creat Ratio  Never done    COVID-19 Vaccine (4 - 2023-24 season) 09/01/2024    Influenza Vaccine (1) 10/01/2024    Annual Physical  10/09/2024    Diabetes Care: GFR  02/19/2025    Annual Depression Screening  Completed    Fall Risk Screening (Annual)  Completed    Pneumococcal Vaccine: 65+ Years  Completed    Zoster Vaccines  Completed

## 2024-10-26 ENCOUNTER — PATIENT MESSAGE (OUTPATIENT)
Dept: FAMILY MEDICINE CLINIC | Facility: CLINIC | Age: 84
End: 2024-10-26

## 2024-10-26 DIAGNOSIS — Z13.1 SCREENING FOR DIABETES MELLITUS (DM): Primary | ICD-10-CM

## 2024-10-28 NOTE — PROGRESS NOTES
The pathology report from last visit showed  right lower cheek, excision:  -Epidermoid cyst  .  Please log in test results, send biopsy results letter. Pt to rtc 4/24 as planned or prn. How Severe Is Your Rosacea?: mild Is This A New Presentation, Or A Follow-Up?: Rosacea

## 2024-10-29 ENCOUNTER — NURSE TRIAGE (OUTPATIENT)
Dept: FAMILY MEDICINE CLINIC | Facility: CLINIC | Age: 84
End: 2024-10-29

## 2024-10-29 RX ORDER — AMOXICILLIN AND CLAVULANATE POTASSIUM 250; 125 MG/1; MG/1
250 TABLET, FILM COATED ORAL EVERY 12 HOURS SCHEDULED
Qty: 14 TABLET | Refills: 0 | Status: SHIPPED | OUTPATIENT
Start: 2024-10-29 | End: 2024-11-05

## 2024-10-29 NOTE — TELEPHONE ENCOUNTER
Medication Detail    Medication Quantity Refills Start End   amoxicillin clavulanate 250-125 MG Oral Tab 14 tablet 0 10/29/2024 11/5/2024   Sig:   Take 1 tablet (250 mg total) by mouth every 12 (twelve) hours for 7 days.     Route:   Oral     Order #:   [id number]     Medication ordered. Advised patient to return to office if symptoms worsening/not improving

## 2024-10-29 NOTE — TELEPHONE ENCOUNTER
Patient daughter Lauren LYN was called and informed her a antibiotic was sent to the pharmacy. Daughter verbalized understanding.

## 2024-10-29 NOTE — TELEPHONE ENCOUNTER
Action Requested: Summary for Provider     []  Critical Lab, Recommendations Needed  [x] Need Additional Advice  []   FYI    []   Need Orders  [] Need Medications Sent to Pharmacy  []  Other     SUMMARY: Disposition per protocol  is to home care.  Patient was prescribed Zpack on at 10/25/24  office visit with Dr Torre  for runny nose, cough, fatigue. She took last dose today.  Daughter is concerned because patient has a persistent productive cough, a hoarse voice, and she feels weaker today. Daughter is not with patient so unable to check temperature.  Daughter asks if patient needs more antibiotics?    Dr Torre out of office today, message also sent to pod mate.   Dr Torre or Elo Matthews, Please advise.    Reason for call: Cough and Condition Update  Onset:started last Monday       Patient's daughter Lauren calling,verified  patient name and date of birth. Calling back with condition update after office visit and antibiotic. Reviewed care advice including call back for increased coughing, chest pain, weakness, fever. Patient verbalizes understanding and agrees to plan of care. Patient verbalizes understanding and agrees to plan of care.          Reason for Disposition   Cough with no complications    Protocols used: Cough-A-OH

## 2024-11-06 ENCOUNTER — OFFICE VISIT (OUTPATIENT)
Dept: INTERNAL MEDICINE CLINIC | Facility: CLINIC | Age: 84
End: 2024-11-06
Payer: MEDICARE

## 2024-11-06 VITALS
WEIGHT: 197 LBS | SYSTOLIC BLOOD PRESSURE: 136 MMHG | DIASTOLIC BLOOD PRESSURE: 72 MMHG | HEART RATE: 76 BPM | BODY MASS INDEX: 32.82 KG/M2 | OXYGEN SATURATION: 98 % | HEIGHT: 65 IN | TEMPERATURE: 98 F | RESPIRATION RATE: 18 BRPM

## 2024-11-06 DIAGNOSIS — R35.0 URINATION FREQUENCY: Primary | ICD-10-CM

## 2024-11-06 DIAGNOSIS — H66.005 RECURRENT ACUTE SUPPURATIVE OTITIS MEDIA WITHOUT SPONTANEOUS RUPTURE OF LEFT TYMPANIC MEMBRANE: ICD-10-CM

## 2024-11-06 DIAGNOSIS — J39.2 THROAT IRRITATION: ICD-10-CM

## 2024-11-06 LAB
APPEARANCE: CLEAR
BILIRUBIN: NEGATIVE
CONTROL LINE PRESENT WITH A CLEAR BACKGROUND (YES/NO): YES YES/NO
GLUCOSE (URINE DIPSTICK): NEGATIVE MG/DL
KETONES (URINE DIPSTICK): NEGATIVE MG/DL
KIT LOT #: NORMAL NUMERIC
MULTISTIX LOT#: ABNORMAL NUMERIC
NITRITE, URINE: NEGATIVE
OCCULT BLOOD: NEGATIVE
PH, URINE: 7.5 (ref 4.5–8)
PROTEIN (URINE DIPSTICK): NEGATIVE MG/DL
SPECIFIC GRAVITY: 1.01 (ref 1–1.03)
STREP GRP A CUL-SCR: NEGATIVE
URINE-COLOR: YELLOW
UROBILINOGEN,SEMI-QN: 0.2 MG/DL (ref 0–1.9)

## 2024-11-06 PROCEDURE — 87880 STREP A ASSAY W/OPTIC: CPT | Performed by: INTERNAL MEDICINE

## 2024-11-06 PROCEDURE — 81003 URINALYSIS AUTO W/O SCOPE: CPT | Performed by: INTERNAL MEDICINE

## 2024-11-06 PROCEDURE — 99213 OFFICE O/P EST LOW 20 MIN: CPT | Performed by: INTERNAL MEDICINE

## 2024-11-06 RX ORDER — CETIRIZINE HYDROCHLORIDE 10 MG/1
1 CAPSULE, LIQUID FILLED ORAL DAILY
Qty: 30 CAPSULE | Refills: 0 | OUTPATIENT
Start: 2024-11-06 | End: 2024-12-06

## 2024-11-06 RX ORDER — DOXYCYCLINE 100 MG/1
100 CAPSULE ORAL 2 TIMES DAILY WITH MEALS
Qty: 14 CAPSULE | Refills: 0 | Status: SHIPPED | OUTPATIENT
Start: 2024-11-06 | End: 2024-11-13

## 2024-11-06 NOTE — PROGRESS NOTES
Bridgett Andino is a 84 year old female.  Chief Complaint   Patient presents with    Sore Throat    Ear Pain     Has fluid on right side , left ear slightly hurting     Urinary Frequency     HPI:    For last three weeks had scratchy throat  Left ear had fluid when seen by Dr. Torre  Took Z pack did not improve  Took amoxacillin, felt better for couple days and now it is worst again. Feels pain in her L ear. Today felt shaky around noon and throat feels scratchy. Just feels over all tired. Daughter suspected uti because she usually feels this way with uti. No fever, no abdominal pain       Current Outpatient Medications   Medication Sig Dispense Refill    Cetirizine HCl (ZYRTEC ALLERGY) 10 MG Oral Cap Take 1 tablet by mouth daily. 30 capsule 0    doxycycline 100 MG Oral Cap Take 1 capsule (100 mg total) by mouth 2 (two) times daily with meals for 7 days. 14 capsule 0    cyclobenzaprine 10 MG Oral Tab 1 tablet at bedtime as needed Orally Once a day for 30 days      triamcinolone 0.1 % External Cream       CRANBERRY OR Take by mouth.      fluticasone propionate 50 MCG/ACT Nasal Suspension 1 spray by Nasal route daily. One spray per each nostril daily. 1 each 0    sucralfate 1 g Oral Tab Take 1 tablet (1 g total) by mouth 3 (three) times daily before meals.      pantoprazole 40 MG Oral Tab EC Take 1 tablet (40 mg total) by mouth every morning before breakfast. 90 tablet 3    amLODIPine besylate 10 MG Oral Tab Take 1 tablet (10 mg total) by mouth daily.      ezetimibe 10 MG Oral Tab Take 1 tablet (10 mg total) by mouth nightly.      Calcium Carbonate Antacid 500 MG Oral Chew Tab Chew 1 tablet (500 mg total) by mouth 3 (three) times daily as needed.  0    Atorvastatin Calcium 40 MG Oral Tab Take 1 tablet (40 mg total) by mouth daily.      Clopidogrel Bisulfate 75 MG Oral Tab Take 1 tablet (75 mg total) by mouth daily.      Metoprolol Succinate  MG Oral Tablet 24 Hr Take 1 tablet (100 mg total) by mouth  daily.      aspirin 81 MG Oral Tab EC Take 1 tablet (81 mg total) by mouth daily.        Past Medical History:    Actinic keratoses    left jaw    Anesthesia complication    Cardiac arrest (HCC)    Cataract    Coronary atherosclerosis    Essential hypertension    GERD (gastroesophageal reflux disease)    High blood pressure    High cholesterol    History of stomach ulcers    Osteoarthritis    PONV (postoperative nausea and vomiting)    SCC (squamous cell carcinoma)    Right dorsal hand    Squamous carcinoma    right cheek    Squamous cell carcinoma    lateral left cheek      Past Surgical History:   Procedure Laterality Date    Angioplasty (coronary)      multiple angioplasties    Angioplasty (coronary)   and 2017    Cath bare metal stent (bms)      Cath drug eluting stent      Total knee replacement        Social History:  Social History     Socioeconomic History    Marital status:    Tobacco Use    Smoking status: Never     Passive exposure: Never    Smokeless tobacco: Never   Vaping Use    Vaping status: Never Used   Substance and Sexual Activity    Alcohol use: No     Alcohol/week: 0.0 standard drinks of alcohol    Drug use: No    Sexual activity: Not Currently   Other Topics Concern    Caffeine Concern Yes    Grew up on a farm No    History of tanning Yes    Outdoor occupation No    Pt has a pacemaker No    Pt has a defibrillator No    Reaction to local anesthetic No      Family History   Problem Relation Age of Onset    Breast Cancer Mother 66    Hypertension Mother     Cancer Mother         Dx age 65- at 70    Stroke Father         CVA    Lung Disorder Father         lung disease    Heart Disease Father     Arthritis Daughter         rheumatoid    Other (benign brain lesion) Son     Breast Cancer Niece         60's      Allergies[1]     REVIEW OF SYSTEMS:   Review of Systems   Review of Systems   Constitutional: Negative for activity change, appetite change and fever.   HENT: Negative for  congestion and voice change.    Respiratory: Negative for cough and shortness of breath.    Cardiovascular: Negative for chest pain.   Gastrointestinal: Negative for abdominal distention, abdominal pain and vomiting.   Genitourinary: Negative for hematuria.   Skin: Negative for wound.   Psychiatric/Behavioral: Negative for behavioral problems.   Wt Readings from Last 5 Encounters:   11/06/24 197 lb (89.4 kg)   10/25/24 196 lb (88.9 kg)   06/19/24 196 lb 9.6 oz (89.2 kg)   02/19/24 195 lb (88.5 kg)   10/09/23 203 lb 3.2 oz (92.2 kg)     Body mass index is 32.78 kg/m².      EXAM:   /72   Pulse 76   Temp 97.9 °F (36.6 °C) (Temporal)   Resp 18   Ht 5' 5\" (1.651 m)   Wt 197 lb (89.4 kg)   SpO2 98%   BMI 32.78 kg/m²   Physical Exam   Constitutional:       Appearance: Normal appearance.   HENT:      Head: Normocephalic.   Eyes:      Conjunctiva/sclera: Conjunctivae normal.   Ear- L ear with TM erythema  Cardiovascular:      Rate and Rhythm: Normal rate and regular rhythm.      Heart sounds: Normal heart sounds. No murmur heard.  Pulmonary:      Effort: Pulmonary effort is normal.      Breath sounds: Normal breath sounds. No rhonchi or rales.   Abdominal:      General: Bowel sounds are normal.      Palpations: Abdomen is soft.      Tenderness: There is no abdominal tenderness.   Musculoskeletal:      Cervical back: Neck supple.      Right lower leg: No edema.      Left lower leg: No edema.   Skin:     General: Skin is warm and dry.   Neurological:      General: No focal deficit present.      Mental Status: He is alert and oriented to person, place, and time. Mental status is at baseline.   Psychiatric:         Mood and Affect: Mood normal.         Behavior: Behavior normal.       ASSESSMENT AND PLAN:   1. Throat irritation  - Strep A Assay W/Optic    2. Urination frequency  - URINALYSIS, AUTO, W/O SCOPE  - Urine Culture, Routine [E]; Future  - Urine Culture, Routine [E]    3. Recurrent acute suppurative otitis  media without spontaneous rupture of left tympanic membrane  - Cetirizine HCl (ZYRTEC ALLERGY) 10 MG Oral Cap; Take 1 tablet by mouth daily.  Dispense: 30 capsule; Refill: 0  - doxycycline 100 MG Oral Cap; Take 1 capsule (100 mg total) by mouth 2 (two) times daily with meals for 7 days.  Dispense: 14 capsule; Refill: 0      The patient indicates understanding of these issues and agrees to the plan.      Julianna Garcia MD          [1]   Allergies  Allergen Reactions    Bactrim Ds UNKNOWN    Codeine UNKNOWN    Macrobid [Nitrofurantoin] UNKNOWN

## 2024-12-26 ENCOUNTER — OFFICE VISIT (OUTPATIENT)
Dept: PODIATRY CLINIC | Facility: CLINIC | Age: 84
End: 2024-12-26
Payer: MEDICARE

## 2024-12-26 DIAGNOSIS — L84 PRE-ULCERATIVE CALLUSES: ICD-10-CM

## 2024-12-26 DIAGNOSIS — B35.1 ONYCHOMYCOSIS: Primary | ICD-10-CM

## 2024-12-26 PROCEDURE — 99213 OFFICE O/P EST LOW 20 MIN: CPT | Performed by: PODIATRIST

## 2024-12-26 NOTE — PROGRESS NOTES
Reason for Visit      Bridgett Andino is a 84 year old female presents today complaining of bilateral foot pain.     History of Present Illness     Patient presents to clinic today after last being seen by podiatry on 7/9/2024 for routine footcare.  Patient does have a diagnosis of idiopathic polyneuropathy and does have bilateral bunion deformities.  Patient is unable to debride her calluses are nails herself due to physical limitations and concern with neuropathy.  Patient denies any open lesions at this time.  Patient does have a history of cardiovascular disease status post carotid angioplasty as well as peripheral arterial disease.    Patient returns to clinic today after last being seen by me on 10/23/2024 for routine footcare.    The following portions of the patient's history were reviewed and updated as appropriate: allergies, current medications, past family history, past medical history, past social history, past surgical history and problem list.    Allergies[1]      Current Outpatient Medications:     cyclobenzaprine 10 MG Oral Tab, 1 tablet at bedtime as needed Orally Once a day for 30 days, Disp: , Rfl:     CRANBERRY OR, Take by mouth., Disp: , Rfl:     fluticasone propionate 50 MCG/ACT Nasal Suspension, 1 spray by Nasal route daily. One spray per each nostril daily., Disp: 1 each, Rfl: 0    sucralfate 1 g Oral Tab, Take 1 tablet (1 g total) by mouth 3 (three) times daily before meals., Disp: , Rfl:     pantoprazole 40 MG Oral Tab EC, Take 1 tablet (40 mg total) by mouth every morning before breakfast., Disp: 90 tablet, Rfl: 3    amLODIPine besylate 10 MG Oral Tab, Take 1 tablet (10 mg total) by mouth daily., Disp: , Rfl:     ezetimibe 10 MG Oral Tab, Take 1 tablet (10 mg total) by mouth nightly., Disp: , Rfl:     Calcium Carbonate Antacid 500 MG Oral Chew Tab, Chew 1 tablet (500 mg total) by mouth 3 (three) times daily as needed., Disp: , Rfl: 0    Atorvastatin Calcium 40 MG Oral Tab, Take 1  tablet (40 mg total) by mouth daily., Disp: , Rfl:     Clopidogrel Bisulfate 75 MG Oral Tab, Take 1 tablet (75 mg total) by mouth daily., Disp: , Rfl:     Metoprolol Succinate  MG Oral Tablet 24 Hr, Take 1 tablet (100 mg total) by mouth daily., Disp: , Rfl:     aspirin 81 MG Oral Tab EC, Take 1 tablet (81 mg total) by mouth daily., Disp: , Rfl:     triamcinolone 0.1 % External Cream, , Disp: , Rfl:     There are no discontinued medications.    Patient Active Problem List   Diagnosis    Actinic keratosis    Inflamed seborrheic keratosis    Personal history of skin cancer    Acute chest pain    Sun-damaged skin    Exertional chest pain    FEDERICO (acute kidney injury) (HCC)    Dermatitis    Neoplasm of uncertain behavior of skin    Recurrent UTI    Atherosclerosis of coronary artery    Benign neoplasm of scalp and skin of neck    Benign neoplasm of skin of upper limb, including shoulder    Cardiac arrest (HCC)    Dyslipidemia    Essential hypertension    Ulcer of other part of lower limb       Past Medical History:    Actinic keratoses    left jaw    Anesthesia complication    Cardiac arrest (HCC)    Cataract    Coronary atherosclerosis    Essential hypertension    GERD (gastroesophageal reflux disease)    High blood pressure    High cholesterol    History of stomach ulcers    Osteoarthritis    PONV (postoperative nausea and vomiting)    SCC (squamous cell carcinoma)    Right dorsal hand    Squamous carcinoma    right cheek    Squamous cell carcinoma    lateral left cheek       Past Surgical History:   Procedure Laterality Date    Angioplasty (coronary)      multiple angioplasties    Angioplasty (coronary)   and 2017    Cath bare metal stent (bms)      Cath drug eluting stent      Total knee replacement         Family History   Problem Relation Age of Onset    Breast Cancer Mother 66    Hypertension Mother     Cancer Mother         Dx age 65- at 70    Stroke Father         CVA    Lung Disorder Father          lung disease    Heart Disease Father     Arthritis Daughter         rheumatoid    Other (benign brain lesion) Son     Breast Cancer Niece         60's       Social History     Occupational History    Not on file   Tobacco Use    Smoking status: Never     Passive exposure: Never    Smokeless tobacco: Never   Vaping Use    Vaping status: Never Used   Substance and Sexual Activity    Alcohol use: No     Alcohol/week: 0.0 standard drinks of alcohol    Drug use: No    Sexual activity: Not Currently       ROS     Constitutional: negative for chills, fevers and sweats  Gastrointestinal: negative for abdominal pain, diarrhea, nausea and vomiting  Genitourinary:negative for dysuria and hematuria  Musculoskeletal:negative for arthralgias and muscle weakness  Neurological: negative for paresthesia and weakness  All others reviewed and negative.      Physical Exam     LE PHYSICAL EXAM    Constitution: Well-developed and well-nourished. Gait appears normal. No apparent distress. Alert and oriented to person, place, and time.  Integument: There are no varicosities. Skin appears moist, warm, and supple with positive hair growth. There are no color changes. No open lesions. No macerations, No Hyperkeratotic lesions.  Vascular examination: Dorsalis pedis and posterior tibial pulses are strong bilaterally with capillary filling time less than 3 seconds to all digits. There is no peripheral edema..  Neurological Sensorium: Grossly intact to sharp/dull. Vibratory: Intact.  Musculoskeletal:   5/5 pedal muscle strength b/l     Advanced trophic changes as: hair growth (decrease or absence) nail changes  (thickening) pigmentary changes (discoloration) skin texture (thin, shiny)              Assessment and Plan     Encounter Diagnoses   Name Primary?    Onychomycosis Yes    Pre-ulcerative calluses          -Patient examined, chart history reviewed.  -Discussed importance of proper pedal hygiene, regular foot checks, and tight glucose  control.  -Sharply debrided nails x10 with a sterile nail nipper achieving a 20% reduction in thickness and length, without incident.   -Ambulate with supportive shoes and inserts and avoid walking barefoot.  -Educated patient on acute signs of infection advised patient to seek immediate medical attention if symptoms arise.       Patient was instructed to call the office or on-call podiatric physician immediately with any issues or concerns before the next scheduled visit. Patient to follow-up in clinic in [address] [place] [address]      Eli Comer DPM, [email or web address].[email or web address] MEHTA  Diplomat, American Board of Foot and Ankle Surgery  Certified in Foot and Rearfoot/Ankle Reconstruction  Fellow of the American College of Foot and Ankle Surgeons  Fellowship Trained Foot and Ankle Surgeon   [facility]     10/23/2024    7:13 AM         [1]   Allergies  Allergen Reactions    Bactrim Ds UNKNOWN    Codeine UNKNOWN    Macrobid [Nitrofurantoin] UNKNOWN

## 2025-01-24 ENCOUNTER — OFFICE VISIT (OUTPATIENT)
Dept: FAMILY MEDICINE CLINIC | Facility: CLINIC | Age: 85
End: 2025-01-24
Payer: MEDICARE

## 2025-01-24 VITALS
HEART RATE: 60 BPM | BODY MASS INDEX: 36.62 KG/M2 | SYSTOLIC BLOOD PRESSURE: 110 MMHG | DIASTOLIC BLOOD PRESSURE: 68 MMHG | RESPIRATION RATE: 16 BRPM | TEMPERATURE: 98 F | OXYGEN SATURATION: 97 % | HEIGHT: 62 IN | WEIGHT: 199 LBS

## 2025-01-24 DIAGNOSIS — R21 RASH: Primary | ICD-10-CM

## 2025-01-24 PROCEDURE — 99213 OFFICE O/P EST LOW 20 MIN: CPT

## 2025-01-24 RX ORDER — CLOTRIMAZOLE 1 %
1 CREAM (GRAM) TOPICAL 2 TIMES DAILY
Qty: 85 G | Refills: 0 | Status: SHIPPED | OUTPATIENT
Start: 2025-01-24 | End: 2025-02-07

## 2025-01-24 RX ORDER — NYSTATIN 100000 [USP'U]/G
1 POWDER TOPICAL 2 TIMES DAILY
Qty: 60 G | Refills: 1 | Status: SHIPPED | OUTPATIENT
Start: 2025-01-24

## 2025-01-24 NOTE — PROGRESS NOTES
HPI:    Patient ID: Bridgett Andino is a 84 year old female.    HPI     Patient here in office with complaint of rash under her right breast fold that is spreading to abdomen, started last weekend.  Denies pain or visible blisters when rash started.  Has tried hydrocortisone, Vaseline, and Aquaphor with no improvement.  States she often sweats under breast, unsure if related to rash.        Review of Systems   Constitutional: Negative.    Respiratory: Negative.     Cardiovascular: Negative.    Neurological: Negative.    Psychiatric/Behavioral: Negative.              Current Outpatient Medications   Medication Sig Dispense Refill    clotrimazole 1 % External Cream Apply 1 Application topically 2 (two) times daily for 14 days. 85 g 0    nystatin 532531 UNIT/GM External Powder Apply 1 Application topically 2 (two) times daily. Use after done with clotrimazole to prevent skin infection 60 g 1    cyclobenzaprine 10 MG Oral Tab 1 tablet at bedtime as needed Orally Once a day for 30 days      triamcinolone 0.1 % External Cream       CRANBERRY OR Take by mouth.      fluticasone propionate 50 MCG/ACT Nasal Suspension 1 spray by Nasal route daily. One spray per each nostril daily. 1 each 0    sucralfate 1 g Oral Tab Take 1 tablet (1 g total) by mouth 3 (three) times daily before meals.      pantoprazole 40 MG Oral Tab EC Take 1 tablet (40 mg total) by mouth every morning before breakfast. 90 tablet 3    amLODIPine besylate 10 MG Oral Tab Take 1 tablet (10 mg total) by mouth daily.      ezetimibe 10 MG Oral Tab Take 1 tablet (10 mg total) by mouth nightly.      Calcium Carbonate Antacid 500 MG Oral Chew Tab Chew 1 tablet (500 mg total) by mouth 3 (three) times daily as needed.  0    Atorvastatin Calcium 40 MG Oral Tab Take 1 tablet (40 mg total) by mouth daily.      Clopidogrel Bisulfate 75 MG Oral Tab Take 1 tablet (75 mg total) by mouth daily.      Metoprolol Succinate  MG Oral Tablet 24 Hr Take 1 tablet (100  mg total) by mouth daily.      aspirin 81 MG Oral Tab EC Take 1 tablet (81 mg total) by mouth daily.       Allergies:Allergies[1]   /68   Pulse 60   Temp 97.8 °F (36.6 °C) (Temporal)   Resp 16   Ht 5' 2\" (1.575 m)   Wt 199 lb (90.3 kg)   SpO2 97%   BMI 36.40 kg/m²   Body mass index is 36.4 kg/m².  PHYSICAL EXAM:   Physical Exam  Vitals reviewed.   Constitutional:       General: She is not in acute distress.     Appearance: Normal appearance. She is not ill-appearing.   Pulmonary:      Effort: Pulmonary effort is normal. No respiratory distress.   Skin:     General: Skin is warm.      Findings: Rash present.      Comments: Small/medium sized patches with mild erythema/maceration under right breast/upper abdomen    Neurological:      General: No focal deficit present.      Mental Status: She is alert and oriented to person, place, and time.   Psychiatric:         Mood and Affect: Mood normal.         Behavior: Behavior normal.         Thought Content: Thought content normal.         Judgment: Judgment normal.                ASSESSMENT/PLAN:   1. Rash  - Suspect tinea corporis vs candidiasis vs other  - clotrimazole 1 % External Cream,  Apply 1 Application topically 2 (two) times daily for 14 days.   - nystatin [id number] UNIT/GM External Powder,  Apply 1 Application topically 2 (two) times daily. Use after done with clotrimazole to prevent skin infection     No orders of the defined types were placed in this encounter.      Meds This Visit:  Requested Prescriptions     Signed Prescriptions Disp Refills    clotrimazole 1 % External Cream 85 g 0     Sig: Apply 1 Application topically 2 (two) times daily for 14 days.    nystatin [id number] UNIT/GM External Powder 60 g 1     Sig: Apply 1 Application topically 2 (two) times daily. Use after done with clotrimazole to prevent skin infection       Imaging & Referrals:  None         ID#[id number]         [1]   Allergies  Allergen Reactions    Bactrim Ds UNKNOWN    Codeine  UNKNOWN    Macrobid [Nitrofurantoin] UNKNOWN

## 2025-02-01 ENCOUNTER — LAB ENCOUNTER (OUTPATIENT)
Dept: LAB | Age: 85
End: 2025-02-01
Attending: STUDENT IN AN ORGANIZED HEALTH CARE EDUCATION/TRAINING PROGRAM
Payer: MEDICARE

## 2025-02-01 DIAGNOSIS — Z13.1 SCREENING FOR DIABETES MELLITUS (DM): ICD-10-CM

## 2025-02-01 LAB
EST. AVERAGE GLUCOSE BLD GHB EST-MCNC: 137 MG/DL (ref 68–126)
HBA1C MFR BLD: 6.4 % (ref ?–5.7)

## 2025-02-01 PROCEDURE — 83036 HEMOGLOBIN GLYCOSYLATED A1C: CPT

## 2025-02-01 PROCEDURE — 36415 COLL VENOUS BLD VENIPUNCTURE: CPT

## 2025-02-25 ENCOUNTER — OFFICE VISIT (OUTPATIENT)
Dept: PODIATRY CLINIC | Facility: CLINIC | Age: 85
End: 2025-02-25
Payer: MEDICARE

## 2025-02-25 DIAGNOSIS — E08.621 DIABETIC ULCER OF TOE OF RIGHT FOOT ASSOCIATED WITH DIABETES MELLITUS DUE TO UNDERLYING CONDITION, LIMITED TO BREAKDOWN OF SKIN (HCC): Primary | ICD-10-CM

## 2025-02-25 DIAGNOSIS — I73.9 PAD (PERIPHERAL ARTERY DISEASE): ICD-10-CM

## 2025-02-25 DIAGNOSIS — L97.511 DIABETIC ULCER OF TOE OF RIGHT FOOT ASSOCIATED WITH DIABETES MELLITUS DUE TO UNDERLYING CONDITION, LIMITED TO BREAKDOWN OF SKIN (HCC): Primary | ICD-10-CM

## 2025-02-25 DIAGNOSIS — L84 PRE-ULCERATIVE CALLUSES: ICD-10-CM

## 2025-02-25 DIAGNOSIS — B35.1 ONYCHOMYCOSIS: ICD-10-CM

## 2025-02-25 DIAGNOSIS — G60.9 IDIOPATHIC POLYNEUROPATHY: ICD-10-CM

## 2025-02-25 PROCEDURE — 99214 OFFICE O/P EST MOD 30 MIN: CPT | Performed by: PODIATRIST

## 2025-02-25 PROCEDURE — 11721 DEBRIDE NAIL 6 OR MORE: CPT | Performed by: PODIATRIST

## 2025-02-26 NOTE — PROGRESS NOTES
Reason for Visit      Bridgett Andino is a 84 year old female presents today complaining of bilateral foot pain.     History of Present Illness     Patient presents to clinic today after last being seen by podiatry on 7/9/2024 for routine footcare.  Patient does have a diagnosis of idiopathic polyneuropathy and does have bilateral bunion deformities.  Patient is unable to debride her calluses are nails herself due to physical limitations and concern with neuropathy.  Patient denies any open lesions at this time.  Patient does have a history of cardiovascular disease status post carotid angioplasty as well as peripheral arterial disease.    Patient returns to clinic today after last being seen by me on 12/26/2024.  For routine footcare.    The following portions of the patient's history were reviewed and updated as appropriate: allergies, current medications, past family history, past medical history, past social history, past surgical history and problem list.    Allergies[1]      Current Outpatient Medications:     nystatin [id number] UNIT/GM External Powder, Apply 1 Application topically 2 (two) times daily. Use after done with clotrimazole to prevent skin infection, Disp: 60 g, Rfl: 1    cyclobenzaprine 10 MG Oral Tab, 1 tablet at bedtime as needed Orally Once a day for 30 days, Disp: , Rfl:     triamcinolone 0.1 % External Cream, , Disp: , Rfl:     CRANBERRY OR, Take by mouth., Disp: , Rfl:     fluticasone propionate 50 MCG/ACT Nasal Suspension, 1 spray by Nasal route daily. One spray per each nostril daily., Disp: 1 each, Rfl: 0    sucralfate 1 g Oral Tab, Take 1 tablet (1 g total) by mouth 3 (three) times daily before meals., Disp: , Rfl:     pantoprazole 40 MG Oral Tab EC, Take 1 tablet (40 mg total) by mouth every morning before breakfast., Disp: 90 tablet, Rfl: 3    amLODIPine besylate 10 MG Oral Tab, Take 1 tablet (10 mg total) by mouth daily., Disp: , Rfl:     ezetimibe 10 MG Oral Tab, Take 1 tablet  (10 mg total) by mouth nightly., Disp: , Rfl:     Calcium Carbonate Antacid 500 MG Oral Chew Tab, Chew 1 tablet (500 mg total) by mouth 3 (three) times daily as needed., Disp: , Rfl: 0    Atorvastatin Calcium 40 MG Oral Tab, Take 1 tablet (40 mg total) by mouth daily., Disp: , Rfl:     Clopidogrel Bisulfate 75 MG Oral Tab, Take 1 tablet (75 mg total) by mouth daily., Disp: , Rfl:     Metoprolol Succinate  MG Oral Tablet 24 Hr, Take 1 tablet (100 mg total) by mouth daily., Disp: , Rfl:     aspirin 81 MG Oral Tab EC, Take 1 tablet (81 mg total) by mouth daily., Disp: , Rfl:     There are no discontinued medications.    Patient Active Problem List   Diagnosis    Actinic keratosis    Inflamed seborrheic keratosis    Personal history of skin cancer    Acute chest pain    Sun-damaged skin    Exertional chest pain    FEDERICO (acute kidney injury)    Dermatitis    Neoplasm of uncertain behavior of skin    Recurrent UTI    Atherosclerosis of coronary artery    Benign neoplasm of scalp and skin of neck    Benign neoplasm of skin of upper limb, including shoulder    Cardiac arrest (HCC)    Dyslipidemia    Essential hypertension    Ulcer of other part of lower limb       Past Medical History:    Actinic keratoses    left jaw    Anesthesia complication    Cardiac arrest (HCC)    Cataract    Coronary atherosclerosis    Essential hypertension    GERD (gastroesophageal reflux disease)    High blood pressure    High cholesterol    History of stomach ulcers    Osteoarthritis    PONV (postoperative nausea and vomiting)    SCC (squamous cell carcinoma)    Right dorsal hand    Squamous carcinoma    right cheek    Squamous cell carcinoma    lateral left cheek       Past Surgical History:   Procedure Laterality Date    Angioplasty (coronary)  2013    multiple angioplasties    Angioplasty (coronary)  2013 and 2017    Cath bare metal stent (bms)      Cath drug eluting stent      Total knee replacement         Family History   Problem  Relation Age of Onset    Breast Cancer Mother 66    Hypertension Mother     Cancer Mother         Dx age 65- at 70    Stroke Father         CVA    Lung Disorder Father         lung disease    Heart Disease Father     Arthritis Daughter         rheumatoid    Other (benign brain lesion) Son     Breast Cancer Niece         60's       Social History     Occupational History    Not on file   Tobacco Use    Smoking status: Never     Passive exposure: Never    Smokeless tobacco: Never   Vaping Use    Vaping status: Never Used   Substance and Sexual Activity    Alcohol use: No     Alcohol/week: 0.0 standard drinks of alcohol    Drug use: No    Sexual activity: Not Currently       ROS     Constitutional: negative for chills, fevers and sweats  Gastrointestinal: negative for abdominal pain, diarrhea, nausea and vomiting  Genitourinary:negative for dysuria and hematuria  Musculoskeletal:negative for arthralgias and muscle weakness  Neurological: negative for paresthesia and weakness  All others reviewed and negative.      Physical Exam     LE PHYSICAL EXAM    Constitution: Well-developed and well-nourished. Gait appears normal. No apparent distress. Alert and oriented to person, place, and time.  Integument: There are no varicosities. Skin appears moist, warm, and supple with positive hair growth. There are no color changes. No open lesions. No macerations, No Hyperkeratotic lesions.  Vascular examination: Dorsalis pedis and posterior tibial pulses are strong bilaterally with capillary filling time less than 3 seconds to all digits. There is no peripheral edema..  Neurological Sensorium: Grossly intact to sharp/dull. Vibratory: Intact.  Musculoskeletal:   5/5 pedal muscle strength b/l     Advanced trophic changes as: hair growth (decrease or absence) nail changes  (thickening) pigmentary changes (discoloration) skin texture (thin, shiny)              Assessment and Plan     Encounter Diagnoses   Name Primary?    Diabetic  ulcer of toe of right foot associated with diabetes mellitus due to underlying condition, limited to breakdown of skin (HCC) Yes    Pre-ulcerative calluses     Onychomycosis     Idiopathic polyneuropathy     PAD (peripheral artery disease)            -Patient examined, chart history reviewed.  -Discussed importance of proper pedal hygiene, regular foot checks, and tight glucose control.  -Sharply debrided nails x10 with a sterile nail nipper achieving a 20% reduction in thickness and length, without incident.   -Ambulate with supportive shoes and inserts and avoid walking barefoot.  -Educated patient on acute signs of infection advised patient to seek immediate medical attention if symptoms arise.       Patient was instructed to call the office or on-call podiatric physician immediately with any issues or concerns before the next scheduled visit. Patient to follow-up in clinic in [address] [place] [address]      Eli Comer DPM, [email or web address].[email or web address] MEHTA  Diplomat, American Board of Foot and Ankle Surgery  Certified in Foot and Rearfoot/Ankle Reconstruction  Fellow of the American College of Foot and Ankle Surgeons  Fellowship Trained Foot and Ankle Surgeon   [facility]     10/23/2024    7:13 AM         [1]   Allergies  Allergen Reactions    Bactrim Ds UNKNOWN    Codeine UNKNOWN    Macrobid [Nitrofurantoin] UNKNOWN

## 2025-03-12 NOTE — PROCEDURES
Procedural Report for Shave Biopsy    Procedure: With the patient is a supine position, the skin was scrubbed with alcohol. Anesthesia was obtained by injecting 1 mL of 1% Xylocaine with Epinephrine.   The skin surrounding the lession was placed under t
rolling walker

## 2025-03-22 ENCOUNTER — LAB ENCOUNTER (OUTPATIENT)
Dept: LAB | Age: 85
End: 2025-03-22
Attending: STUDENT IN AN ORGANIZED HEALTH CARE EDUCATION/TRAINING PROGRAM
Payer: MEDICARE

## 2025-03-22 ENCOUNTER — OFFICE VISIT (OUTPATIENT)
Dept: FAMILY MEDICINE CLINIC | Facility: CLINIC | Age: 85
End: 2025-03-22
Payer: MEDICARE

## 2025-03-22 VITALS
DIASTOLIC BLOOD PRESSURE: 69 MMHG | SYSTOLIC BLOOD PRESSURE: 145 MMHG | TEMPERATURE: 99 F | BODY MASS INDEX: 34.96 KG/M2 | OXYGEN SATURATION: 97 % | HEART RATE: 78 BPM | HEIGHT: 62 IN | WEIGHT: 190 LBS

## 2025-03-22 DIAGNOSIS — E78.5 HYPERLIPIDEMIA, UNSPECIFIED HYPERLIPIDEMIA TYPE: ICD-10-CM

## 2025-03-22 DIAGNOSIS — R63.0 DECREASED APPETITE: ICD-10-CM

## 2025-03-22 DIAGNOSIS — E11.9 TYPE 2 DIABETES MELLITUS WITHOUT COMPLICATION, UNSPECIFIED WHETHER LONG TERM INSULIN USE (HCC): ICD-10-CM

## 2025-03-22 DIAGNOSIS — R63.0 DECREASED APPETITE: Primary | ICD-10-CM

## 2025-03-22 DIAGNOSIS — R10.13 DYSPEPSIA: ICD-10-CM

## 2025-03-22 DIAGNOSIS — K86.9 PANCREATIC LESION (HCC): ICD-10-CM

## 2025-03-22 LAB
ALBUMIN SERPL-MCNC: 4.4 G/DL (ref 3.2–4.8)
ALBUMIN/GLOB SERPL: 1.4 {RATIO} (ref 1–2)
ALP LIVER SERPL-CCNC: 153 U/L
ALT SERPL-CCNC: 10 U/L
ANION GAP SERPL CALC-SCNC: 8 MMOL/L (ref 0–18)
AST SERPL-CCNC: 18 U/L (ref ?–34)
BASOPHILS # BLD AUTO: 0.04 X10(3) UL (ref 0–0.2)
BASOPHILS NFR BLD AUTO: 0.3 %
BILIRUB SERPL-MCNC: 0.6 MG/DL (ref 0.2–1.1)
BILIRUB UR QL: NEGATIVE
BUN BLD-MCNC: 14 MG/DL (ref 9–23)
BUN/CREAT SERPL: 12.1 (ref 10–20)
CALCIUM BLD-MCNC: 8.9 MG/DL (ref 8.7–10.4)
CHLORIDE SERPL-SCNC: 101 MMOL/L (ref 98–112)
CHOLEST SERPL-MCNC: 126 MG/DL (ref ?–200)
CO2 SERPL-SCNC: 24 MMOL/L (ref 21–32)
COLOR UR: YELLOW
CREAT BLD-MCNC: 1.16 MG/DL
CREAT UR-SCNC: 184.5 MG/DL
DEPRECATED RDW RBC AUTO: 43.8 FL (ref 35.1–46.3)
EGFRCR SERPLBLD CKD-EPI 2021: 46 ML/MIN/1.73M2 (ref 60–?)
EOSINOPHIL # BLD AUTO: 0.07 X10(3) UL (ref 0–0.7)
EOSINOPHIL NFR BLD AUTO: 0.6 %
ERYTHROCYTE [DISTWIDTH] IN BLOOD BY AUTOMATED COUNT: 14.9 % (ref 11–15)
EST. AVERAGE GLUCOSE BLD GHB EST-MCNC: 137 MG/DL (ref 68–126)
FASTING PATIENT LIPID ANSWER: YES
FASTING STATUS PATIENT QL REPORTED: YES
GLOBULIN PLAS-MCNC: 3.1 G/DL (ref 2–3.5)
GLUCOSE BLD-MCNC: 135 MG/DL (ref 70–99)
GLUCOSE UR-MCNC: NORMAL MG/DL
HBA1C MFR BLD: 6.4 % (ref ?–5.7)
HCT VFR BLD AUTO: 38.8 %
HDLC SERPL-MCNC: 34 MG/DL (ref 40–59)
HGB BLD-MCNC: 12.4 G/DL
IMM GRANULOCYTES # BLD AUTO: 0.05 X10(3) UL (ref 0–1)
IMM GRANULOCYTES NFR BLD: 0.4 %
KETONES UR-MCNC: NEGATIVE MG/DL
LDLC SERPL CALC-MCNC: 66 MG/DL (ref ?–100)
LEUKOCYTE ESTERASE UR QL STRIP.AUTO: 250
LIPASE SERPL-CCNC: 30 U/L (ref 12–53)
LYMPHOCYTES # BLD AUTO: 1.13 X10(3) UL (ref 1–4)
LYMPHOCYTES NFR BLD AUTO: 9.9 %
MCH RBC QN AUTO: 25.9 PG (ref 26–34)
MCHC RBC AUTO-ENTMCNC: 32 G/DL (ref 31–37)
MCV RBC AUTO: 81 FL
MICROALBUMIN UR-MCNC: 7.7 MG/DL
MICROALBUMIN/CREAT 24H UR-RTO: 41.7 UG/MG (ref ?–30)
MONOCYTES # BLD AUTO: 0.85 X10(3) UL (ref 0.1–1)
MONOCYTES NFR BLD AUTO: 7.4 %
NEUTROPHILS # BLD AUTO: 9.32 X10 (3) UL (ref 1.5–7.7)
NEUTROPHILS # BLD AUTO: 9.32 X10(3) UL (ref 1.5–7.7)
NEUTROPHILS NFR BLD AUTO: 81.4 %
NITRITE UR QL STRIP.AUTO: NEGATIVE
NONHDLC SERPL-MCNC: 92 MG/DL (ref ?–130)
OSMOLALITY SERPL CALC.SUM OF ELEC: 279 MOSM/KG (ref 275–295)
PH UR: 5.5 [PH] (ref 5–8)
PLATELET # BLD AUTO: 489 10(3)UL (ref 150–450)
POTASSIUM SERPL-SCNC: 4.2 MMOL/L (ref 3.5–5.1)
PROT SERPL-MCNC: 7.5 G/DL (ref 5.7–8.2)
PROT UR-MCNC: 30 MG/DL
RBC # BLD AUTO: 4.79 X10(6)UL
SODIUM SERPL-SCNC: 133 MMOL/L (ref 136–145)
SP GR UR STRIP: 1.02 (ref 1–1.03)
TRIGL SERPL-MCNC: 146 MG/DL (ref 30–149)
TSI SER-ACNC: 3.22 UIU/ML (ref 0.55–4.78)
UROBILINOGEN UR STRIP-ACNC: NORMAL
VLDLC SERPL CALC-MCNC: 22 MG/DL (ref 0–30)
WBC # BLD AUTO: 11.5 X10(3) UL (ref 4–11)

## 2025-03-22 PROCEDURE — 80053 COMPREHEN METABOLIC PANEL: CPT

## 2025-03-22 PROCEDURE — 85025 COMPLETE CBC W/AUTO DIFF WBC: CPT

## 2025-03-22 PROCEDURE — 84443 ASSAY THYROID STIM HORMONE: CPT

## 2025-03-22 PROCEDURE — 80061 LIPID PANEL: CPT

## 2025-03-22 PROCEDURE — 81001 URINALYSIS AUTO W/SCOPE: CPT

## 2025-03-22 PROCEDURE — 83036 HEMOGLOBIN GLYCOSYLATED A1C: CPT

## 2025-03-22 PROCEDURE — 82570 ASSAY OF URINE CREATININE: CPT

## 2025-03-22 PROCEDURE — 99214 OFFICE O/P EST MOD 30 MIN: CPT | Performed by: STUDENT IN AN ORGANIZED HEALTH CARE EDUCATION/TRAINING PROGRAM

## 2025-03-22 PROCEDURE — 36415 COLL VENOUS BLD VENIPUNCTURE: CPT

## 2025-03-22 PROCEDURE — 82043 UR ALBUMIN QUANTITATIVE: CPT

## 2025-03-22 PROCEDURE — 83690 ASSAY OF LIPASE: CPT

## 2025-03-22 PROCEDURE — G2211 COMPLEX E/M VISIT ADD ON: HCPCS | Performed by: STUDENT IN AN ORGANIZED HEALTH CARE EDUCATION/TRAINING PROGRAM

## 2025-03-22 RX ORDER — PANTOPRAZOLE SODIUM 40 MG/1
40 TABLET, DELAYED RELEASE ORAL
Qty: 90 TABLET | Refills: 3 | Status: SHIPPED | OUTPATIENT
Start: 2025-03-22

## 2025-03-22 NOTE — PROGRESS NOTES
HPI:    Patient ID: Bridgett Andino is a 84 year old female.    HPI  Pt presenting for follow-up. Daughter present for visit.    Onset of decreased appetite, abd discomfort since last weekend (1wk)  Thinks acid reflux has flared  Small amount intake  3/20: feeling better  Decreased appetite today  Frequent nocturia  No other urinary symptoms  Per chart review, h/o large hiatal hernia  Last EGD 2020 with mild gastritis  Taking PPI daily with carafate PRN  Has been followed by GI     Woke up with sweats 3 nights in a row  Temperature changes    Last GI eval:  This is a 82 y/o female with pmh sig for HTN, Hyperlipidemia, CAD s/p stents (last placed in 2017) here for follow up of GERD and known large paraesophageal hernia and pancreatic cyst.   She was hospitalized at [facility] in 10/2020. Her cardiac evaluation was negative including a negative coronary angiogram. This was thought to be esophageal related and thus asked me to evaluate. Patient underwent EGD evaluation on 10/29/20:    Post-Op Diagnosis:   1. Nonobstructive Schatzki's ring. Biopsied. Not dilated due to being on plavix thru yesterday - mild chronic reflux esophagitis - no grier's  2. Normal appearing esophageal mucosa.  3. Large paraesophageal hernia, 8cm  4. Mild gastritis - no hpylori  5. Benign appearing gastric polyp, likely fundic gland polyp - fundic gland polyp  6. Normal duodenum     Patient was on protonix 40mg daily since that scope. She was on carafate as well.   She was seen in the ER on 4/7/23. Labs with normal LFTs except for minimally elevated alk phos. Lipase and LFTs normal. CT scan with question of possible pancreatic head fullness.     She underwent upper EUS with Dr. Heard on 8/3/23. This revealed: large HH, pancreatic cysts (3 all < 1.0cm) with benign features, normal appearing pancreas, some portahepatic adenopathy.     She was last seen by me in 10/2023. She was still on protonix 40mg daily.   She had repeat MRI abdomen  4/2024. This revealed: several small pancreatic cystic lesions - unchanged. The previous indeterminate lobulated lesion pancreatic head is less conspicuous.     Patient feels well. No abdominal pain. No nausea or vomiting.   No diarrhea. Occasional constipation.   No melena or brbpr.   Weight has been stable.     She takes protonix 40mg daily and the one carafate daily.   No dysphagia.       Review of Systems   A comprehensive 10 point review of systems was completed.  Pertinent positives and negatives noted in the the HPI.       Current Outpatient Medications   Medication Sig Dispense Refill    pantoprazole 40 MG Oral Tab EC Take 1 tablet (40 mg total) by mouth every morning before breakfast. 90 tablet 3    nystatin 458929 UNIT/GM External Powder Apply 1 Application topically 2 (two) times daily. Use after done with clotrimazole to prevent skin infection 60 g 1    cyclobenzaprine 10 MG Oral Tab 1 tablet at bedtime as needed Orally Once a day for 30 days      CRANBERRY OR Take by mouth.      fluticasone propionate 50 MCG/ACT Nasal Suspension 1 spray by Nasal route daily. One spray per each nostril daily. 1 each 0    pantoprazole 40 MG Oral Tab EC Take 1 tablet (40 mg total) by mouth every morning before breakfast. 90 tablet 3    amLODIPine besylate 10 MG Oral Tab Take 1 tablet (10 mg total) by mouth daily.      ezetimibe 10 MG Oral Tab Take 1 tablet (10 mg total) by mouth nightly.      Calcium Carbonate Antacid 500 MG Oral Chew Tab Chew 1 tablet (500 mg total) by mouth 3 (three) times daily as needed.  0    Atorvastatin Calcium 40 MG Oral Tab Take 1 tablet (40 mg total) by mouth daily.      Clopidogrel Bisulfate 75 MG Oral Tab Take 1 tablet (75 mg total) by mouth daily.      Metoprolol Succinate  MG Oral Tablet 24 Hr Take 1 tablet (100 mg total) by mouth daily.      aspirin 81 MG Oral Tab EC Take 1 tablet (81 mg total) by mouth daily.      sucralfate 1 g Oral Tab Take 1 tablet (1 g total) by mouth every 6  (six) hours as needed. 90 tablet 1     Allergies:Allergies[1]   Vitals:    03/22/25 0753   BP: 145/69   Pulse: 78   Temp: 99.2 °F (37.3 °C)   SpO2: 97%   Weight: 190 lb (86.2 kg)   Height: 5' 2\" (1.575 m)       Body mass index is 34.75 kg/m².   PHYSICAL EXAM:   Physical Exam  Vitals reviewed.   Constitutional:       General: She is not in acute distress.     Appearance: Normal appearance. She is well-developed.   HENT:      Head: Normocephalic and atraumatic.      Right Ear: External ear normal.      Left Ear: External ear normal.   Eyes:      Conjunctiva/sclera: Conjunctivae normal.   Neck:      Thyroid: No thyroid mass or thyroid tenderness.   Cardiovascular:      Rate and Rhythm: Normal rate and regular rhythm.      Pulses: Normal pulses.      Heart sounds: Normal heart sounds, S1 normal and S2 normal. No murmur heard.  Pulmonary:      Effort: Pulmonary effort is normal. No respiratory distress.      Breath sounds: Normal breath sounds. No wheezing, rhonchi or rales.   Abdominal:      General: Bowel sounds are normal.      Palpations: Abdomen is soft.      Tenderness: There is no abdominal tenderness. There is no right CVA tenderness, left CVA tenderness, guarding or rebound.   Musculoskeletal:      Cervical back: Normal range of motion and neck supple. No muscular tenderness.      Right lower leg: No edema.      Left lower leg: No edema.   Lymphadenopathy:      Cervical: No cervical adenopathy.   Skin:     General: Skin is warm and dry.      Coloration: Skin is not jaundiced.   Neurological:      General: No focal deficit present.      Mental Status: She is alert and oriented to person, place, and time. Mental status is at baseline.   Psychiatric:         Attention and Perception: Attention normal.         Mood and Affect: Mood normal.         Behavior: Behavior normal. Behavior is cooperative.         Cognition and Memory: Cognition normal.             ASSESSMENT/PLAN:   1. Decreased appetite  Discussed DDx  including dyspepsia, hiatal hernia, other  - will check labs  - will check CT  - BRAT diet  - will trial BID PPI for now, carafate TID PRN  - increase hydration and rest as tolerated  - discussed red flags for urgent reevaluation  - anticipate GI follow-up if no improvement  - CT ABDOMEN+PELVIS(CONTRAST ONLY)(CPT=74177); Future  - TSH W Reflex To Free T4; Future  - Comp Metabolic Panel (14); Future  - Lipid Panel; Future  - CBC With Differential With Platelet; Future  - Urinalysis, Routine; Future  - Lipase [E]; Future    2. Dyspepsia  As above  - CT ABDOMEN+PELVIS(CONTRAST ONLY)(CPT=74177); Future  - pantoprazole 40 MG Oral Tab EC; Take 1 tablet (40 mg total) by mouth every morning before breakfast.  Dispense: 90 tablet; Refill: 3    3. Pancreatic lesion (HCC)  - CT ABDOMEN+PELVIS(CONTRAST ONLY)(CPT=74177); Future    4. Type 2 diabetes mellitus without complication, unspecified whether long term insulin use (HCC)  Will check labs  - Microalb/Creat Ratio, Random Urine; Future  - Hemoglobin A1C; Future    5. Hyperlipidemia, unspecified hyperlipidemia type  - Lipid Panel; Future    Pt verbalized understanding and agrees with plan.    Orders Placed This Encounter   Procedures    TSH W Reflex To Free T4    Comp Metabolic Panel (14)    Lipid Panel    CBC With Differential With Platelet    Urinalysis, Routine    Lipase [E]    Microalb/Creat Ratio, Random Urine    Hemoglobin A1C       Meds This Visit:  Requested Prescriptions     Signed Prescriptions Disp Refills    pantoprazole 40 MG Oral Tab EC 90 tablet 3     Sig: Take 1 tablet (40 mg total) by mouth every morning before breakfast.       Imaging & Referrals:  None         ID#[id number]         [1]   Allergies  Allergen Reactions    Bactrim Ds UNKNOWN    Codeine UNKNOWN    Macrobid [Nitrofurantoin] UNKNOWN

## 2025-03-23 ENCOUNTER — HOSPITAL ENCOUNTER (OUTPATIENT)
Dept: CT IMAGING | Age: 85
Discharge: HOME | End: 2025-03-23
Attending: STUDENT IN AN ORGANIZED HEALTH CARE EDUCATION/TRAINING PROGRAM
Payer: MEDICARE

## 2025-03-23 ENCOUNTER — HOSPITAL ENCOUNTER (OUTPATIENT)
Dept: CT IMAGING | Age: 85
End: 2025-03-23
Attending: STUDENT IN AN ORGANIZED HEALTH CARE EDUCATION/TRAINING PROGRAM
Payer: MEDICARE

## 2025-03-23 DIAGNOSIS — R10.13 DYSPEPSIA: ICD-10-CM

## 2025-03-23 DIAGNOSIS — K86.9 PANCREATIC LESION (HCC): ICD-10-CM

## 2025-03-23 DIAGNOSIS — R82.90 ABNORMAL URINALYSIS: Primary | ICD-10-CM

## 2025-03-23 DIAGNOSIS — R63.0 DECREASED APPETITE: ICD-10-CM

## 2025-03-23 PROCEDURE — 74177 CT ABD & PELVIS W/CONTRAST: CPT | Performed by: STUDENT IN AN ORGANIZED HEALTH CARE EDUCATION/TRAINING PROGRAM

## 2025-03-23 RX ORDER — CIPROFLOXACIN 250 MG/1
250 TABLET, FILM COATED ORAL 2 TIMES DAILY
Qty: 10 TABLET | Refills: 0 | Status: SHIPPED | OUTPATIENT
Start: 2025-03-23 | End: 2025-03-28

## 2025-03-27 ENCOUNTER — TELEPHONE (OUTPATIENT)
Dept: FAMILY MEDICINE CLINIC | Facility: CLINIC | Age: 85
End: 2025-03-27

## 2025-03-27 NOTE — TELEPHONE ENCOUNTER
Daughter calls inquiring on CT results from 03/23.  Results not avialable due to known radiology delay.  Called CT and spoke with Renetta who will request ASAP read.  Await report.

## 2025-03-28 NOTE — TELEPHONE ENCOUNTER
Written by Mary Jane Torre MD on 3/27/2025  5:20 PM CDT  Seen by patient Bridgettandrei Andino on 3/27/2025  7:07 PM

## 2025-04-01 DIAGNOSIS — R10.13 DYSPEPSIA: Primary | ICD-10-CM

## 2025-04-03 ENCOUNTER — LAB ENCOUNTER (OUTPATIENT)
Dept: LAB | Age: 85
End: 2025-04-03
Attending: STUDENT IN AN ORGANIZED HEALTH CARE EDUCATION/TRAINING PROGRAM
Payer: MEDICARE

## 2025-04-03 DIAGNOSIS — R82.90 ABNORMAL URINALYSIS: ICD-10-CM

## 2025-04-03 LAB
ANION GAP SERPL CALC-SCNC: 7 MMOL/L (ref 0–18)
BASOPHILS # BLD AUTO: 0.06 X10(3) UL (ref 0–0.2)
BASOPHILS NFR BLD AUTO: 0.7 %
BUN BLD-MCNC: 13 MG/DL (ref 9–23)
BUN/CREAT SERPL: 11.3 (ref 10–20)
CALCIUM BLD-MCNC: 8.7 MG/DL (ref 8.7–10.4)
CHLORIDE SERPL-SCNC: 103 MMOL/L (ref 98–112)
CO2 SERPL-SCNC: 27 MMOL/L (ref 21–32)
CREAT BLD-MCNC: 1.15 MG/DL
DEPRECATED RDW RBC AUTO: 46.5 FL (ref 35.1–46.3)
EGFRCR SERPLBLD CKD-EPI 2021: 46 ML/MIN/1.73M2 (ref 60–?)
EOSINOPHIL # BLD AUTO: 0.29 X10(3) UL (ref 0–0.7)
EOSINOPHIL NFR BLD AUTO: 3.5 %
ERYTHROCYTE [DISTWIDTH] IN BLOOD BY AUTOMATED COUNT: 15.6 % (ref 11–15)
FASTING STATUS PATIENT QL REPORTED: YES
GLUCOSE BLD-MCNC: 109 MG/DL (ref 70–99)
HCT VFR BLD AUTO: 39.7 %
HGB BLD-MCNC: 11.9 G/DL
IMM GRANULOCYTES # BLD AUTO: 0.02 X10(3) UL (ref 0–1)
IMM GRANULOCYTES NFR BLD: 0.2 %
LYMPHOCYTES # BLD AUTO: 2.25 X10(3) UL (ref 1–4)
LYMPHOCYTES NFR BLD AUTO: 27 %
MCH RBC QN AUTO: 24.3 PG (ref 26–34)
MCHC RBC AUTO-ENTMCNC: 30 G/DL (ref 31–37)
MCV RBC AUTO: 81 FL
MONOCYTES # BLD AUTO: 0.67 X10(3) UL (ref 0.1–1)
MONOCYTES NFR BLD AUTO: 8 %
NEUTROPHILS # BLD AUTO: 5.05 X10 (3) UL (ref 1.5–7.7)
NEUTROPHILS # BLD AUTO: 5.05 X10(3) UL (ref 1.5–7.7)
NEUTROPHILS NFR BLD AUTO: 60.6 %
OSMOLALITY SERPL CALC.SUM OF ELEC: 285 MOSM/KG (ref 275–295)
PLATELET # BLD AUTO: 561 10(3)UL (ref 150–450)
POTASSIUM SERPL-SCNC: 4.7 MMOL/L (ref 3.5–5.1)
RBC # BLD AUTO: 4.9 X10(6)UL
SODIUM SERPL-SCNC: 137 MMOL/L (ref 136–145)
WBC # BLD AUTO: 8.3 X10(3) UL (ref 4–11)

## 2025-04-03 PROCEDURE — 85025 COMPLETE CBC W/AUTO DIFF WBC: CPT

## 2025-04-03 PROCEDURE — 80048 BASIC METABOLIC PNL TOTAL CA: CPT

## 2025-04-03 PROCEDURE — 36415 COLL VENOUS BLD VENIPUNCTURE: CPT

## 2025-04-03 RX ORDER — SUCRALFATE 1 G/1
1 TABLET ORAL EVERY 6 HOURS PRN
Qty: 90 TABLET | Refills: 1 | Status: SHIPPED | OUTPATIENT
Start: 2025-04-03

## 2025-04-10 ENCOUNTER — OFFICE VISIT (OUTPATIENT)
Dept: DERMATOLOGY CLINIC | Facility: CLINIC | Age: 85
End: 2025-04-10
Payer: MEDICARE

## 2025-04-10 DIAGNOSIS — L82.1 SEBORRHEIC KERATOSES: ICD-10-CM

## 2025-04-10 DIAGNOSIS — L30.9 DERMATITIS: ICD-10-CM

## 2025-04-10 DIAGNOSIS — D23.9 BENIGN NEOPLASM OF SKIN, UNSPECIFIED LOCATION: ICD-10-CM

## 2025-04-10 DIAGNOSIS — L81.4 SOLAR LENTIGO: ICD-10-CM

## 2025-04-10 DIAGNOSIS — D22.9 MULTIPLE MELANOCYTIC NEVI: ICD-10-CM

## 2025-04-10 DIAGNOSIS — L57.0 ACTINIC KERATOSIS: Primary | ICD-10-CM

## 2025-04-10 DIAGNOSIS — Z08 ENCOUNTER FOR FOLLOW-UP SURVEILLANCE OF SKIN CANCER: ICD-10-CM

## 2025-04-10 DIAGNOSIS — Z85.828 ENCOUNTER FOR FOLLOW-UP SURVEILLANCE OF SKIN CANCER: ICD-10-CM

## 2025-04-14 NOTE — PROGRESS NOTES
Bridgett Andino is a 84 year old female.  HPI:     CC:    Chief Complaint   Patient presents with    Full Skin Exam     LOV 24  Pt with Hx of Ak's and SCC, presents for 1yr f/u and FBSE.  Denies any concerns at this time.         Allergies:  Bactrim ds, Codeine, and Macrobid [nitrofurantoin]    HISTORY:    Past Medical History:    Actinic keratoses    left jaw    Anesthesia complication    Cardiac arrest (HCC)    Cataract    Coronary atherosclerosis    Essential hypertension    GERD (gastroesophageal reflux disease)    High blood pressure    High cholesterol    History of stomach ulcers    Osteoarthritis    PONV (postoperative nausea and vomiting)    SCC (squamous cell carcinoma)    Right dorsal hand    Squamous carcinoma    right cheek    Squamous cell carcinoma    lateral left cheek      Past Surgical History:   Procedure Laterality Date    Angioplasty (coronary)      multiple angioplasties    Angioplasty (coronary)   and     Cath bare metal stent (bms)      Cath drug eluting stent      Total knee replacement        Family History   Problem Relation Age of Onset    Breast Cancer Mother 66    Hypertension Mother     Cancer Mother         Dx age 65- at 70    Stroke Father         CVA    Lung Disorder Father         lung disease    Heart Disease Father     Arthritis Daughter         rheumatoid    Other (benign brain lesion) Son     Breast Cancer Niece         60's      Social History     Socioeconomic History    Marital status:    Tobacco Use    Smoking status: Never     Passive exposure: Never    Smokeless tobacco: Never   Vaping Use    Vaping status: Never Used   Substance and Sexual Activity    Alcohol use: No     Alcohol/week: 0.0 standard drinks of alcohol    Drug use: No    Sexual activity: Not Currently   Other Topics Concern    Caffeine Concern Yes    Grew up on a farm No    History of tanning Yes    Outdoor occupation No    Pt has a pacemaker No    Pt has a defibrillator  No    Reaction to local anesthetic No        Current Outpatient Medications   Medication Sig Dispense Refill    sucralfate 1 g Oral Tab Take 1 tablet (1 g total) by mouth every 6 (six) hours as needed. 90 tablet 1    pantoprazole 40 MG Oral Tab EC Take 1 tablet (40 mg total) by mouth every morning before breakfast. 90 tablet 3    nystatin [id number] UNIT/GM External Powder Apply 1 Application topically 2 (two) times daily. Use after done with clotrimazole to prevent skin infection 60 g 1    cyclobenzaprine 10 MG Oral Tab 1 tablet at bedtime as needed Orally Once a day for 30 days      CRANBERRY OR Take by mouth.      fluticasone propionate 50 MCG/ACT Nasal Suspension 1 spray by Nasal route daily. One spray per each nostril daily. 1 each 0    pantoprazole 40 MG Oral Tab EC Take 1 tablet (40 mg total) by mouth every morning before breakfast. 90 tablet 3    amLODIPine besylate 10 MG Oral Tab Take 1 tablet (10 mg total) by mouth daily.      ezetimibe 10 MG Oral Tab Take 1 tablet (10 mg total) by mouth nightly.      Calcium Carbonate Antacid 500 MG Oral Chew Tab Chew 1 tablet (500 mg total) by mouth 3 (three) times daily as needed.  0    Atorvastatin Calcium 40 MG Oral Tab Take 1 tablet (40 mg total) by mouth daily.      Clopidogrel Bisulfate 75 MG Oral Tab Take 1 tablet (75 mg total) by mouth daily.      Metoprolol Succinate  MG Oral Tablet 24 Hr Take 1 tablet (100 mg total) by mouth daily.      aspirin 81 MG Oral Tab EC Take 1 tablet (81 mg total) by mouth daily.       Allergies:   Allergies   Allergen Reactions    Bactrim Ds UNKNOWN    Codeine UNKNOWN    Macrobid [Nitrofurantoin] UNKNOWN       Past Medical History:    Actinic keratoses    left jaw    Anesthesia complication    Cardiac arrest (HCC)    Cataract    Coronary atherosclerosis    Essential hypertension    GERD (gastroesophageal reflux disease)    High blood pressure    High cholesterol    History of stomach ulcers    Osteoarthritis    PONV (postoperative  nausea and vomiting)    SCC (squamous cell carcinoma)    Right dorsal hand    Squamous carcinoma    right cheek    Squamous cell carcinoma    lateral left cheek     Past Surgical History:   Procedure Laterality Date    Angioplasty (coronary)  2013    multiple angioplasties    Angioplasty (coronary)   and 2017    Cath bare metal stent (bms)      Cath drug eluting stent      Total knee replacement       Social History     Socioeconomic History    Marital status:      Spouse name: Not on file    Number of children: Not on file    Years of education: Not on file    Highest education level: Not on file   Occupational History    Not on file   Tobacco Use    Smoking status: Never     Passive exposure: Never    Smokeless tobacco: Never   Vaping Use    Vaping status: Never Used   Substance and Sexual Activity    Alcohol use: No     Alcohol/week: 0.0 standard drinks of alcohol    Drug use: No    Sexual activity: Not Currently   Other Topics Concern     Service Not Asked    Blood Transfusions Not Asked    Caffeine Concern Yes    Occupational Exposure Not Asked    Hobby Hazards Not Asked    Sleep Concern Not Asked    Stress Concern Not Asked    Weight Concern Not Asked    Special Diet Not Asked    Back Care Not Asked    Exercise Not Asked    Bike Helmet Not Asked    Seat Belt Not Asked    Self-Exams Not Asked    Grew up on a farm No    History of tanning Yes    Outdoor occupation No    Pt has a pacemaker No    Pt has a defibrillator No    Breast feeding Not Asked    Reaction to local anesthetic No   Social History Narrative    Not on file     Social Drivers of Health     Food Insecurity: Not on file   Transportation Needs: Not on file   Stress: Not on file   Housing Stability: Not on file     Family History   Problem Relation Age of Onset    Breast Cancer Mother 66    Hypertension Mother     Cancer Mother         Dx age 65- at 70    Stroke Father         CVA    Lung Disorder Father         lung disease     Heart Disease Father     Arthritis Daughter         rheumatoid    Other (benign brain lesion) Son     Breast Cancer Niece         60's       There were no vitals filed for this visit.    HPI:    Chief Complaint   Patient presents with    Full Skin Exam     LOV 4/11/24  Pt with Hx of Ak's and SCC, presents for 1yr f/u and FBSE.  Denies any concerns at this time.     Follow-up history SCC AK's, recent biopsy wart right upper arm  Here with her daughter  Past notes/ records and appropriate/relevant lab results including pathology and past body maps reviewed. Including outside notes/ PCP notes as appropriate. Updated and new information noted in current visit.         History of Present Illness  Bridgett Andino is an 84 year old female who presents for a dermatological evaluation of benign keratoses and intertrigo.    She presents for evaluation of benign keratoses located on her back, arms, and behind the knee. These keratoses are described as benign and not bothersome, with no irritation or discomfort. No issues with keratoses on her nose, chest, or other areas. She recalls a previous treatment involving a spray on her nose but has no current concerns.    She has a history of intertrigo, previously treated with nystatin powder. She has not been using the nystatin powder consistently as the intertrigo is not currently symptomatic, with no itching or pain. The condition tends to flare up with warmer weather and increased sweating.    No irritation, itching, or pain associated with her keratoses or intertrigo. No new or concerning symptoms related to her skin conditions.      Patient presents with concerns above.    Patient has been in their usual state of health.      History, medications, allergies reviewed as noted.      ROS:  new relevant systemic complaints as noted       Physical Examination:     Well-developed well-nourished patient alert oriented in no acute distress.  Exam performed, including scalp,  head, neck, face,nails, hair, external eyes, including conjunctival mucosa, eyelids, lips external ears, back, chest,/ breasts, axillae,  abdomen, arms, legs, palms.     Multiple light to medium brown, well marginated, uniformly pigmented, macules and papules 6 mm and less scattered on exam. pigmented lesions examined with dermoscopy benign-appearing patterns.     Waxy tannish keratotic papules scattered, cherry-red vascular papules scattered.    See map today's date for lesions noted .      Otherwise remarkable for lesions as noted on map.  See details of examination  See Assessment /Plan for additional history and physical exam also:    Assessment / plan:    No orders of the defined types were placed in this encounter.      Meds & Refills for this Visit:  Requested Prescriptions      No prescriptions requested or ordered in this encounter         Encounter Diagnoses   Name Primary?    Actinic keratosis Yes    Seborrheic keratoses     Multiple melanocytic nevi     Benign neoplasm of skin, unspecified location     Solar lentigo     Encounter for follow-up surveillance of skin cancer     Dermatitis        See details on map.      Remarkable for:      Physical Exam  SKIN: Multiple benign keratoses on the back, arm, behind the knee, chest, and nose.    Results          Fall risk assessment:  Given the results of the fall risk questionnaire given today.   Suggest:   Make sure there is adequate lighting.  Eliminate throw rugs or possible sources of tripping & falling  Consider grab bars on the shower & toilet.  Hand rails on all stair cases  Ambulatory assistance devices such as cane or walker as indicate.  To ensure home safety measures.      Patient seen for follow-up long-term monitoring, treatment of  Actinic keratoses, history of skin cancer, long-term monitoring  Plan of care:  ongoing surveillance, monitoring including regular follow-up due to longer term risk of recurrence, new lesions.  See previous  notes.  There is a longitudinal care relationship with me, the care plan reflects the ongoing nature of the continuous relationship of care, and the medical record indicates that there is ongoing treatment of a serious/complex medical condition which I am currently managing.  is Applicable     Assessment & Plan  Intertrigo  Intertrigo under the bra line is currently asymptomatic with no pruritus or pain. The condition is associated with moisture, sweating, and humidity, and may exacerbate in warmer weather. Nystatin powder is a mild antifungal; stronger treatment may be required if symptoms worsen.  - Apply nystatin powder as needed, particularly during warmer weather.  - Consider desitin or zinc oxide for additional relief.  - Evaluate for signs of exacerbation or need for stronger antifungal therapy.    Benign keratoses  Multiple benign keratoses present on the back, arms, chest, and behind the knee, without irritation or discomfort. These lesions are not concerning but may become more crusty with age or irritation.  - Observe keratoses for changes or irritation.  - Advise against manipulation to prevent irritation.  - Consider lotions with alpha hydroxy acids for symptomatic relief if necessary.        Epidermoid cyst right cheek no recurrence post excision 10/23    Actinic Keratoses.  Precancerous nature discussed. Sun protection, sunscreen/ blocks encouraged .  Monitoring for new lesions.  Sun damage additional recurrent and new actinic keratoses, skin cancers may occur in areas of prior actinic keratoses, related to past sun exposure to minimize current sun exposure.  Sunscreen applied consistently regularly, reapplication and sun protection while driving recommended.    Monitor smaller papule at right medial canthus nasal sidewall  Otherwise no significant changes      Right dorsal hand SCC post excision with Dr. Matthews no recurrence      Numerous benign keratoses reassurance lentigines.  Biopsy wart  right upper arm no recurrence healing well.    History of AK's, SCC no recurrence SCC  As noted previously  In particular new keratoses over the dorsal hands arms  Extensive lentigines, generalized face arms chest legs waxy tan keratotic papules lesions in areas of concern as noted reassurance given.  Benign nature discussed.  Possibility of cryo, alphahydroxy acids over-the-counter retinol's discussed.  Continue monitoring sunscreen  No other susupicious lesions on todays  Exam.  Few scattered nevi benign-appearing monitor    More dermatitis curious stable continue moisturizers,    Please refer to map for specific lesions.  See additional diagnoses.  Pros cons of various therapies, risks benefits discussed.Pathophysiology discussed with patient.  Therapeutic options reviewed.  See  Medications in grid.  Instructions reviewed at length.    Benign nevi, seborrheic  keratoses, cherry angiomas:  Reassurance regarding other benign skin lesions.    Monitor for new or changing lesions. Signs and symptoms of skin cancer, ABCDE's of melanoma ( additional information available at AAD.org, skincancer.org) Encourage Sunscreen (broad-spectrum, ideally mineral-based-UVA/UVB -SPF 30 or higher) use encouraged, sun protection/sun protective clothing, self exams reviewed Followup as noted RTC ---routine checkup 6 mos -one year or p.r.n.    Encounter Times   Including precharting, reviewing chart, prior notes obtaining history: 10 minutes, medical exam :10 minutes, notes on body map, plan, counseling 10minutes My total time spent caring for the patient on the day of the encounter: 30 minutes     The patient indicates understanding of these issues and agrees to the plan.  The patient is asked to return as noted in follow-up/ above.    This note was generated using Dragon voice recognition software.  Please contact me regarding any confusion resulting from errors in recognition..  Note to patient and family: The 21st Century Cures Act  makes medical notes like these available to patients. However, be advised this is a medical document. It is intended as [id number] communication and monitoring of a patient's care needs. It is written in medical language and may contain abbreviations or verbiage that are unfamiliar. It may appear blunt or direct. Medical documents are intended to carry relevant information, facts as evident and the clinical opinion of the practitioner.

## 2025-04-18 ENCOUNTER — NURSE TRIAGE (OUTPATIENT)
Dept: FAMILY MEDICINE CLINIC | Facility: CLINIC | Age: 85
End: 2025-04-18

## 2025-04-18 DIAGNOSIS — R30.0 DYSURIA: Primary | ICD-10-CM

## 2025-04-18 NOTE — TELEPHONE ENCOUNTER
Spoke to daughter Lauren (on EBONI).   Informed Lauren of new standing orders, up to 5 times.   Lauren thanked us for the call back and will bring in a urine specimen.

## 2025-04-18 NOTE — TELEPHONE ENCOUNTER
Action Requested: Summary for Provider     []  Critical Lab, Recommendations Needed  [] Need Additional Advice  []   FYI    [x]   Need Orders  [] Need Medications Sent to Pharmacy  []  Other     SUMMARY:  I received a call from patient daughter. She stated that patient gets frequent UTI and in the past you had standing orders for patient to get urine test done. This standing order has . Daughter will like if you can place a urine standing order again. Please advise.  Per daughter patient has not felt well the past 2 days. Patient is starting to have  some urine frequency no other symptom. She wants to make sure there is a order in the system so she can take her if her symptom persist.    do you approve to place this standing urine culture order?     Reason for call: Urinary Symptoms  Onset: 2 days     Reason for Disposition   Urinating more frequently than usual (i.e., frequency)    Protocols used: Urinary Symptoms-A-[place]

## 2025-04-30 ENCOUNTER — OFFICE VISIT (OUTPATIENT)
Dept: PODIATRY CLINIC | Facility: CLINIC | Age: 85
End: 2025-04-30
Payer: MEDICARE

## 2025-04-30 DIAGNOSIS — B35.1 ONYCHOMYCOSIS OF MULTIPLE TOENAILS WITH TYPE 2 DIABETES MELLITUS (HCC): ICD-10-CM

## 2025-04-30 DIAGNOSIS — E11.69 ONYCHOMYCOSIS OF MULTIPLE TOENAILS WITH TYPE 2 DIABETES MELLITUS (HCC): ICD-10-CM

## 2025-04-30 DIAGNOSIS — E08.621 DIABETIC ULCER OF TOE OF RIGHT FOOT ASSOCIATED WITH DIABETES MELLITUS DUE TO UNDERLYING CONDITION, LIMITED TO BREAKDOWN OF SKIN (HCC): Primary | ICD-10-CM

## 2025-04-30 DIAGNOSIS — L97.511 DIABETIC ULCER OF TOE OF RIGHT FOOT ASSOCIATED WITH DIABETES MELLITUS DUE TO UNDERLYING CONDITION, LIMITED TO BREAKDOWN OF SKIN (HCC): Primary | ICD-10-CM

## 2025-04-30 DIAGNOSIS — I73.9 PAD (PERIPHERAL ARTERY DISEASE): ICD-10-CM

## 2025-04-30 DIAGNOSIS — L84 PRE-ULCERATIVE CALLUSES: ICD-10-CM

## 2025-04-30 PROCEDURE — 11721 DEBRIDE NAIL 6 OR MORE: CPT | Performed by: PODIATRIST

## 2025-04-30 PROCEDURE — 99214 OFFICE O/P EST MOD 30 MIN: CPT | Performed by: PODIATRIST

## 2025-04-30 NOTE — PROGRESS NOTES
Reason for Visit      Bridgett Andino is a 85 year old female presents today complaining of bilateral foot pain.     History of Present Illness     Patient presents to clinic today after last being seen by podiatry on 7/9/2024 for routine footcare.  Patient does have a diagnosis of idiopathic polyneuropathy and does have bilateral bunion deformities.  Patient is unable to debride her calluses are nails herself due to physical limitations and concern with neuropathy.  Patient denies any open lesions at this time.  Patient does have a history of cardiovascular disease status post carotid angioplasty as well as peripheral arterial disease.    Patient returns to clinic today after last being seen by me on 2/25/2025 for routine nail care.    The following portions of the patient's history were reviewed and updated as appropriate: allergies, current medications, past family history, past medical history, past social history, past surgical history and problem list.    Allergies[1]      Current Outpatient Medications:     sucralfate 1 g Oral Tab, Take 1 tablet (1 g total) by mouth every 6 (six) hours as needed., Disp: 90 tablet, Rfl: 1    pantoprazole 40 MG Oral Tab EC, Take 1 tablet (40 mg total) by mouth every morning before breakfast., Disp: 90 tablet, Rfl: 3    nystatin 146901 UNIT/GM External Powder, Apply 1 Application topically 2 (two) times daily. Use after done with clotrimazole to prevent skin infection, Disp: 60 g, Rfl: 1    cyclobenzaprine 10 MG Oral Tab, 1 tablet at bedtime as needed Orally Once a day for 30 days, Disp: , Rfl:     CRANBERRY OR, Take by mouth., Disp: , Rfl:     fluticasone propionate 50 MCG/ACT Nasal Suspension, 1 spray by Nasal route daily. One spray per each nostril daily., Disp: 1 each, Rfl: 0    pantoprazole 40 MG Oral Tab EC, Take 1 tablet (40 mg total) by mouth every morning before breakfast., Disp: 90 tablet, Rfl: 3    amLODIPine besylate 10 MG Oral Tab, Take 1 tablet (10 mg  total) by mouth daily., Disp: , Rfl:     ezetimibe 10 MG Oral Tab, Take 1 tablet (10 mg total) by mouth nightly., Disp: , Rfl:     Calcium Carbonate Antacid 500 MG Oral Chew Tab, Chew 1 tablet (500 mg total) by mouth 3 (three) times daily as needed., Disp: , Rfl: 0    Atorvastatin Calcium 40 MG Oral Tab, Take 1 tablet (40 mg total) by mouth daily., Disp: , Rfl:     Clopidogrel Bisulfate 75 MG Oral Tab, Take 1 tablet (75 mg total) by mouth daily., Disp: , Rfl:     Metoprolol Succinate  MG Oral Tablet 24 Hr, Take 1 tablet (100 mg total) by mouth daily., Disp: , Rfl:     aspirin 81 MG Oral Tab EC, Take 1 tablet (81 mg total) by mouth daily., Disp: , Rfl:     There are no discontinued medications.    Patient Active Problem List   Diagnosis    Actinic keratosis    Inflamed seborrheic keratosis    Personal history of skin cancer    Acute chest pain    Sun-damaged skin    Exertional chest pain    FEDERICO (acute kidney injury)    Dermatitis    Neoplasm of uncertain behavior of skin    Recurrent UTI    Atherosclerosis of coronary artery    Benign neoplasm of scalp and skin of neck    Benign neoplasm of skin of upper limb, including shoulder    Cardiac arrest (HCC)    Dyslipidemia    Essential hypertension    Ulcer of other part of lower limb       Past Medical History:    Actinic keratoses    left jaw    Anesthesia complication    Cardiac arrest (HCC)    Cataract    Coronary atherosclerosis    Essential hypertension    GERD (gastroesophageal reflux disease)    High blood pressure    High cholesterol    History of stomach ulcers    Osteoarthritis    PONV (postoperative nausea and vomiting)    SCC (squamous cell carcinoma)    Right dorsal hand    Squamous carcinoma    right cheek    Squamous cell carcinoma    lateral left cheek       Past Surgical History:   Procedure Laterality Date    Angioplasty (coronary)  2013    multiple angioplasties    Angioplasty (coronary)  2013 and 2017    Cath bare metal stent (bms)      Cath  drug eluting stent      Total knee replacement         Family History   Problem Relation Age of Onset    Breast Cancer Mother 66    Hypertension Mother     Cancer Mother         Dx age 65- at 70    Stroke Father         CVA    Lung Disorder Father         lung disease    Heart Disease Father     Arthritis Daughter         rheumatoid    Other (benign brain lesion) Son     Breast Cancer Niece         60's       Social History     Occupational History    Not on file   Tobacco Use    Smoking status: Never     Passive exposure: Never    Smokeless tobacco: Never   Vaping Use    Vaping status: Never Used   Substance and Sexual Activity    Alcohol use: No     Alcohol/week: 0.0 standard drinks of alcohol    Drug use: No    Sexual activity: Not Currently       ROS     Constitutional: negative for chills, fevers and sweats  Gastrointestinal: negative for abdominal pain, diarrhea, nausea and vomiting  Genitourinary:negative for dysuria and hematuria  Musculoskeletal:negative for arthralgias and muscle weakness  Neurological: negative for paresthesia and weakness  All others reviewed and negative.      Physical Exam     LE PHYSICAL EXAM    Constitution: Well-developed and well-nourished. Gait appears normal. No apparent distress. Alert and oriented to person, place, and time.  Integument: There are no varicosities. Skin appears moist, warm, and supple with positive hair growth. There are no color changes. No open lesions. No macerations, No Hyperkeratotic lesions.  Vascular examination: Dorsalis pedis and posterior tibial pulses are strong bilaterally with capillary filling time less than 3 seconds to all digits. There is no peripheral edema..  Neurological Sensorium: Grossly intact to sharp/dull. Vibratory: Intact.  Musculoskeletal:   5/5 pedal muscle strength b/l     Advanced trophic changes as: hair growth (decrease or absence) nail changes  (thickening) pigmentary changes (discoloration) skin texture (thin, shiny)       PROCEDURE:    Nails 1 through 5 bilateral debrided with use of a nail nipper.  Patient Toller procedure well had no complications.  Neurovascular status intact to the level of the digits post procedure.         Assessment and Plan     Encounter Diagnoses   Name Primary?    Diabetic ulcer of toe of right foot associated with diabetes mellitus due to underlying condition, limited to breakdown of skin (HCC) Yes    Pre-ulcerative calluses     Onychomycosis of multiple toenails with type 2 diabetes mellitus (HCC)     PAD (peripheral artery disease)              -Patient examined, chart history reviewed.  -Discussed importance of proper pedal hygiene, regular foot checks, and tight glucose control.  -Sharply debrided nails x10 with a sterile nail nipper achieving a 20% reduction in thickness and length, without incident.   -Ambulate with supportive shoes and inserts and avoid walking barefoot.  -Educated patient on acute signs of infection advised patient to seek immediate medical attention if symptoms arise.       Patient was instructed to call the office or on-call podiatric physician immediately with any issues or concerns before the next scheduled visit. Patient to follow-up in clinic in [address] [place] [address]      Eli Comer DPM, D.ASHOK MEHTA  Diplomat, American Board of Foot and Ankle Surgery  Certified in Foot and Rearfoot/Ankle Reconstruction  Fellow of the American College of Foot and Ankle Surgeons  Fellowship Trained Foot and Ankle Surgeon   [facility]     10/23/2024    7:13 AM         [1]   Allergies  Allergen Reactions    Bactrim Ds UNKNOWN    Codeine UNKNOWN    Macrobid [Nitrofurantoin] UNKNOWN

## 2025-06-18 ENCOUNTER — OFFICE VISIT (OUTPATIENT)
Dept: PODIATRY CLINIC | Facility: CLINIC | Age: 85
End: 2025-06-18
Payer: MEDICARE

## 2025-06-18 DIAGNOSIS — L84 PRE-ULCERATIVE CALLUSES: Primary | ICD-10-CM

## 2025-06-18 PROCEDURE — 99214 OFFICE O/P EST MOD 30 MIN: CPT | Performed by: PODIATRIST

## 2025-06-18 NOTE — PROGRESS NOTES
Reason for Visit      Bridgett Andino is a 85 year old female presents today complaining of bilateral foot pain.     History of Present Illness     Patient presents to clinic today after last being seen by podiatry on 7/9/2024 for routine footcare.  Patient does have a diagnosis of idiopathic polyneuropathy and does have bilateral bunion deformities.  Patient is unable to debride her calluses are nails herself due to physical limitations and concern with neuropathy.  Patient denies any open lesions at this time.  Patient does have a history of cardiovascular disease status post carotid angioplasty as well as peripheral arterial disease.    Patient returns to clinic today after last being seen by me on 4/30/2025.  Patient returns to clinic today only to assess the preulcerative callus subfirst metatarsal head left foot    The following portions of the patient's history were reviewed and updated as appropriate: allergies, current medications, past family history, past medical history, past social history, past surgical history and problem list.    Allergies[1]      Current Outpatient Medications:     sucralfate 1 g Oral Tab, Take 1 tablet (1 g total) by mouth every 6 (six) hours as needed., Disp: 90 tablet, Rfl: 1    pantoprazole 40 MG Oral Tab EC, Take 1 tablet (40 mg total) by mouth every morning before breakfast., Disp: 90 tablet, Rfl: 3    nystatin [id number] UNIT/GM External Powder, Apply 1 Application topically 2 (two) times daily. Use after done with clotrimazole to prevent skin infection, Disp: 60 g, Rfl: 1    cyclobenzaprine 10 MG Oral Tab, 1 tablet at bedtime as needed Orally Once a day for 30 days, Disp: , Rfl:     CRANBERRY OR, Take by mouth., Disp: , Rfl:     fluticasone propionate 50 MCG/ACT Nasal Suspension, 1 spray by Nasal route daily. One spray per each nostril daily., Disp: 1 each, Rfl: 0    pantoprazole 40 MG Oral Tab EC, Take 1 tablet (40 mg total) by mouth every morning before breakfast.,  Disp: 90 tablet, Rfl: 3    amLODIPine besylate 10 MG Oral Tab, Take 1 tablet (10 mg total) by mouth daily., Disp: , Rfl:     ezetimibe 10 MG Oral Tab, Take 1 tablet (10 mg total) by mouth nightly., Disp: , Rfl:     Calcium Carbonate Antacid 500 MG Oral Chew Tab, Chew 1 tablet (500 mg total) by mouth 3 (three) times daily as needed., Disp: , Rfl: 0    Atorvastatin Calcium 40 MG Oral Tab, Take 1 tablet (40 mg total) by mouth daily., Disp: , Rfl:     Clopidogrel Bisulfate 75 MG Oral Tab, Take 1 tablet (75 mg total) by mouth daily., Disp: , Rfl:     Metoprolol Succinate  MG Oral Tablet 24 Hr, Take 1 tablet (100 mg total) by mouth daily., Disp: , Rfl:     aspirin 81 MG Oral Tab EC, Take 1 tablet (81 mg total) by mouth daily., Disp: , Rfl:     There are no discontinued medications.    Patient Active Problem List   Diagnosis    Actinic keratosis    Inflamed seborrheic keratosis    Personal history of skin cancer    Acute chest pain    Sun-damaged skin    Exertional chest pain    FEDERICO (acute kidney injury)    Dermatitis    Neoplasm of uncertain behavior of skin    Recurrent UTI    Atherosclerosis of coronary artery    Benign neoplasm of scalp and skin of neck    Benign neoplasm of skin of upper limb, including shoulder    Cardiac arrest (HCC)    Dyslipidemia    Essential hypertension    Ulcer of other part of lower limb       Past Medical History:    Actinic keratoses    left jaw    Anesthesia complication    Cardiac arrest (HCC)    Cataract    Coronary atherosclerosis    Essential hypertension    GERD (gastroesophageal reflux disease)    High blood pressure    High cholesterol    History of stomach ulcers    Osteoarthritis    PONV (postoperative nausea and vomiting)    SCC (squamous cell carcinoma)    Right dorsal hand    Squamous carcinoma    right cheek    Squamous cell carcinoma    lateral left cheek       Past Surgical History:   Procedure Laterality Date    Angioplasty (coronary)  2013    multiple angioplasties     Angioplasty (coronary)  2013 and 2017    Cath bare metal stent (bms)      Cath drug eluting stent      Total knee replacement         Family History   Problem Relation Age of Onset    Breast Cancer Mother 66    Hypertension Mother     Cancer Mother         Dx age 65- at 70    Stroke Father         CVA    Lung Disorder Father         lung disease    Heart Disease Father     Arthritis Daughter         rheumatoid    Other (benign brain lesion) Son     Breast Cancer Niece         60's       Social History     Occupational History    Not on file   Tobacco Use    Smoking status: Never     Passive exposure: Never    Smokeless tobacco: Never   Vaping Use    Vaping status: Never Used   Substance and Sexual Activity    Alcohol use: No     Alcohol/week: 0.0 standard drinks of alcohol    Drug use: No    Sexual activity: Not Currently       ROS     Constitutional: negative for chills, fevers and sweats  Gastrointestinal: negative for abdominal pain, diarrhea, nausea and vomiting  Genitourinary:negative for dysuria and hematuria  Musculoskeletal:negative for arthralgias and muscle weakness  Neurological: negative for paresthesia and weakness  All others reviewed and negative.      Physical Exam     LE PHYSICAL EXAM    Constitution: Well-developed and well-nourished. Gait appears normal. No apparent distress. Alert and oriented to person, place, and time.  Integument: There are no varicosities. Skin appears moist, warm, and supple with positive hair growth. There are no color changes. No open lesions. No macerations, No Hyperkeratotic lesions.  Vascular examination: Dorsalis pedis and posterior tibial pulses are strong bilaterally with capillary filling time less than 3 seconds to all digits. There is no peripheral edema..  Neurological Sensorium: Grossly intact to sharp/dull. Vibratory: Intact.  Musculoskeletal:   5/5 pedal muscle strength b/l     Advanced trophic changes as: hair growth (decrease or absence) nail changes   (thickening) pigmentary changes (discoloration) skin texture (thin, shiny)            Assessment and Plan     Encounter Diagnoses   Name Primary?    Pre-ulcerative calluses Yes               -Patient examined, chart history reviewed.  -Discussed importance of proper pedal hygiene, regular foot checks, and tight glucose control.  -Sharply debrided nails x10 with a sterile nail nipper achieving a 20% reduction in thickness and length, without incident.   -Ambulate with supportive shoes and inserts and avoid walking barefoot.  -Educated patient on acute signs of infection advised patient to seek immediate medical attention if symptoms arise.       Patient was instructed to call the office or on-call podiatric physician immediately with any issues or concerns before the next scheduled visit. Patient to follow-up in clinic in 3 months      Eli Comer DPM, D.ASHOK MEHTA  Diplomat, American Board of Foot and Ankle Surgery  Certified in Foot and Rearfoot/Ankle Reconstruction  Fellow of the American College of Foot and Ankle Surgeons  Fellowship Trained Foot and Ankle Surgeon   [facility]     10/23/2024    7:13 AM         [1]   Allergies  Allergen Reactions    Bactrim Ds UNKNOWN    Codeine UNKNOWN    Macrobid [Nitrofurantoin] UNKNOWN

## 2025-07-09 ENCOUNTER — OFFICE VISIT (OUTPATIENT)
Dept: PODIATRY CLINIC | Facility: CLINIC | Age: 85
End: 2025-07-09
Payer: MEDICARE

## 2025-07-09 DIAGNOSIS — E11.69 ONYCHOMYCOSIS OF MULTIPLE TOENAILS WITH TYPE 2 DIABETES MELLITUS (HCC): ICD-10-CM

## 2025-07-09 DIAGNOSIS — I73.9 PAD (PERIPHERAL ARTERY DISEASE): ICD-10-CM

## 2025-07-09 DIAGNOSIS — L97.511 DIABETIC ULCER OF TOE OF RIGHT FOOT ASSOCIATED WITH DIABETES MELLITUS DUE TO UNDERLYING CONDITION, LIMITED TO BREAKDOWN OF SKIN (HCC): Primary | ICD-10-CM

## 2025-07-09 DIAGNOSIS — B35.1 ONYCHOMYCOSIS OF MULTIPLE TOENAILS WITH TYPE 2 DIABETES MELLITUS (HCC): ICD-10-CM

## 2025-07-09 DIAGNOSIS — E08.621 DIABETIC ULCER OF TOE OF RIGHT FOOT ASSOCIATED WITH DIABETES MELLITUS DUE TO UNDERLYING CONDITION, LIMITED TO BREAKDOWN OF SKIN (HCC): Primary | ICD-10-CM

## 2025-07-09 PROCEDURE — 99214 OFFICE O/P EST MOD 30 MIN: CPT | Performed by: PODIATRIST

## 2025-07-09 PROCEDURE — 11721 DEBRIDE NAIL 6 OR MORE: CPT | Performed by: PODIATRIST

## 2025-07-09 NOTE — PROGRESS NOTES
Reason for Visit      Bridgett Andino is a 85 year old female presents today complaining of bilateral foot pain.     History of Present Illness     Patient presents to clinic today after last being seen by podiatry on 7/9/2024 for routine footcare.  Patient does have a diagnosis of idiopathic polyneuropathy and does have bilateral bunion deformities.  Patient is unable to debride her calluses are nails herself due to physical limitations and concern with neuropathy.  Patient denies any open lesions at this time.  Patient does have a history of cardiovascular disease status post carotid angioplasty as well as peripheral arterial disease.    Patient returns to clinic today after last being seen by me on 6/18/2025 with concern for ulcerations on plantar aspect of her right foot.  Patient returns to clinic today for routine nail care.      The following portions of the patient's history were reviewed and updated as appropriate: allergies, current medications, past family history, past medical history, past social history, past surgical history and problem list.    Allergies[1]      Current Outpatient Medications:     sucralfate 1 g Oral Tab, Take 1 tablet (1 g total) by mouth every 6 (six) hours as needed., Disp: 90 tablet, Rfl: 1    pantoprazole 40 MG Oral Tab EC, Take 1 tablet (40 mg total) by mouth every morning before breakfast., Disp: 90 tablet, Rfl: 3    nystatin [id number] UNIT/GM External Powder, Apply 1 Application topically 2 (two) times daily. Use after done with clotrimazole to prevent skin infection, Disp: 60 g, Rfl: 1    cyclobenzaprine 10 MG Oral Tab, 1 tablet at bedtime as needed Orally Once a day for 30 days, Disp: , Rfl:     CRANBERRY OR, Take by mouth., Disp: , Rfl:     fluticasone propionate 50 MCG/ACT Nasal Suspension, 1 spray by Nasal route daily. One spray per each nostril daily., Disp: 1 each, Rfl: 0    pantoprazole 40 MG Oral Tab EC, Take 1 tablet (40 mg total) by mouth every morning before  breakfast., Disp: 90 tablet, Rfl: 3    amLODIPine besylate 10 MG Oral Tab, Take 1 tablet (10 mg total) by mouth daily., Disp: , Rfl:     ezetimibe 10 MG Oral Tab, Take 1 tablet (10 mg total) by mouth nightly., Disp: , Rfl:     Calcium Carbonate Antacid 500 MG Oral Chew Tab, Chew 1 tablet (500 mg total) by mouth 3 (three) times daily as needed., Disp: , Rfl: 0    Atorvastatin Calcium 40 MG Oral Tab, Take 1 tablet (40 mg total) by mouth daily., Disp: , Rfl:     Clopidogrel Bisulfate 75 MG Oral Tab, Take 1 tablet (75 mg total) by mouth daily., Disp: , Rfl:     Metoprolol Succinate  MG Oral Tablet 24 Hr, Take 1 tablet (100 mg total) by mouth daily., Disp: , Rfl:     aspirin 81 MG Oral Tab EC, Take 1 tablet (81 mg total) by mouth daily., Disp: , Rfl:     There are no discontinued medications.    Patient Active Problem List   Diagnosis    Actinic keratosis    Inflamed seborrheic keratosis    Personal history of skin cancer    Acute chest pain    Sun-damaged skin    Exertional chest pain    FEDERICO (acute kidney injury)    Dermatitis    Neoplasm of uncertain behavior of skin    Recurrent UTI    Atherosclerosis of coronary artery    Benign neoplasm of scalp and skin of neck    Benign neoplasm of skin of upper limb, including shoulder    Cardiac arrest (HCC)    Dyslipidemia    Essential hypertension    Ulcer of other part of lower limb       Past Medical History:    Actinic keratoses    left jaw    Anesthesia complication    Cardiac arrest (HCC)    Cataract    Coronary atherosclerosis    Essential hypertension    GERD (gastroesophageal reflux disease)    High blood pressure    High cholesterol    History of stomach ulcers    Osteoarthritis    PONV (postoperative nausea and vomiting)    SCC (squamous cell carcinoma)    Right dorsal hand    Squamous carcinoma    right cheek    Squamous cell carcinoma    lateral left cheek       Past Surgical History:   Procedure Laterality Date    Angioplasty (coronary)  2013    multiple  angioplasties    Angioplasty (coronary)   and 2017    Cath bare metal stent (bms)      Cath drug eluting stent      Total knee replacement         Family History   Problem Relation Age of Onset    Breast Cancer Mother 66    Hypertension Mother     Cancer Mother         Dx age 65- at 70    Stroke Father         CVA    Lung Disorder Father         lung disease    Heart Disease Father     Arthritis Daughter         rheumatoid    Other (benign brain lesion) Son     Breast Cancer Niece         60's       Social History     Occupational History    Not on file   Tobacco Use    Smoking status: Never     Passive exposure: Never    Smokeless tobacco: Never   Vaping Use    Vaping status: Never Used   Substance and Sexual Activity    Alcohol use: No     Alcohol/week: 0.0 standard drinks of alcohol    Drug use: No    Sexual activity: Not Currently       ROS     Constitutional: negative for chills, fevers and sweats  Gastrointestinal: negative for abdominal pain, diarrhea, nausea and vomiting  Genitourinary:negative for dysuria and hematuria  Musculoskeletal:negative for arthralgias and muscle weakness  Neurological: negative for paresthesia and weakness  All others reviewed and negative.      Physical Exam     LE PHYSICAL EXAM    Constitution: Well-developed and well-nourished. Gait appears normal. No apparent distress. Alert and oriented to person, place, and time.  Integument: There are no varicosities. Skin appears moist, warm, and supple with positive hair growth. There are no color changes. No open lesions. No macerations, No Hyperkeratotic lesions.  Vascular examination: Dorsalis pedis and posterior tibial pulses are strong bilaterally with capillary filling time less than 3 seconds to all digits. There is no peripheral edema..  Neurological Sensorium: Grossly intact to sharp/dull. Vibratory: Intact.  Musculoskeletal:   5/5 pedal muscle strength b/l     Advanced trophic changes as: hair growth (decrease or absence)  nail changes  (thickening) pigmentary changes (discoloration) skin texture (thin, shiny)        Nails 1 through 5 bilateral debrided with use of a nail nipper.  Patient Toller procedure well had no complications.  Neurovascular status intact to the level of the digits post procedure.       Assessment and Plan     Encounter Diagnoses   Name Primary?    Diabetic ulcer of toe of right foot associated with diabetes mellitus due to underlying condition, limited to breakdown of skin (HCC) Yes    PAD (peripheral artery disease)     Onychomycosis of multiple toenails with type 2 diabetes mellitus (HCC)                  -Patient examined, chart history reviewed.  -Discussed importance of proper pedal hygiene, regular foot checks, and tight glucose control.  -Sharply debrided nails x10 with a sterile nail nipper achieving a 20% reduction in thickness and length, without incident.   -Ambulate with supportive shoes and inserts and avoid walking barefoot.  -Educated patient on acute signs of infection advised patient to seek immediate medical attention if symptoms arise.       Patient was instructed to call the office or on-call podiatric physician immediately with any issues or concerns before the next scheduled visit. Patient to follow-up in clinic in 3 months      Eli Comer DPM, [email or web address] MEHTA  Diplomat, American Board of Foot and Ankle Surgery  Certified in Foot and Rearfoot/Ankle Reconstruction  Fellow of the American College of Foot and Ankle Surgeons  Fellowship Trained Foot and Ankle Surgeon   [facility]     10/23/2024    7:13 AM         [1]   Allergies  Allergen Reactions    Bactrim Ds UNKNOWN    Codeine UNKNOWN    Macrobid [Nitrofurantoin] UNKNOWN

## (undated) DEVICE — CONMED SCOPE SAVER BITE BLOCK, 20X27 MM: Brand: SCOPE SAVER

## (undated) DEVICE — MEDI-VAC NON-CONDUCTIVE SUCTION TUBING 6MM X 1.8M (6FT.) L: Brand: CARDINAL HEALTH

## (undated) DEVICE — 6 ML SYRINGE LUER-LOCK TIP: Brand: MONOJECT

## (undated) DEVICE — 3 ML SYRINGE LUER-LOCK TIP: Brand: MONOJECT

## (undated) DEVICE — KIT CLEAN ENDOKIT 1.1OZ GOWNX2

## (undated) DEVICE — Device: Brand: DUAL NARE NASAL CANNULAE FEMALE LUER CON 7FT O2 TUBE

## (undated) DEVICE — Device: Brand: DEFENDO AIR/WATER/SUCTION AND BIOPSY VALVE

## (undated) DEVICE — 60 ML SYRINGE REGULAR TIP: Brand: MONOJECT

## (undated) DEVICE — Device: Brand: CUSTOM PROCEDURE KIT

## (undated) DEVICE — LINE MNTR ADLT SET O2 INTMD

## (undated) NOTE — LETTER
Hospital Discharge Documentation  Please phone to schedule a hospital follow up appointment. No discharge summary available at this time, below is the most recent progress note  for your review .       From: [id number] Hospitalist's Office  Phone: [phone] ALLERGIES:  No known drug allergies. She had side effects to codeine.     FAMILY HISTORY:  Mother had breast cancer and hypertension.   Father had hypertension, chronic obstructive pulmonary disease, and heart disease.     SOCIAL HISTORY:  No tobacco, alco 4.       Hypertension. 5.       Hyperlipidemia.     The patient will be admitted to telemetry floor for observation. Obtain Lexiscan stress test in the morning. Cardiology consult.   If cardiac workup is negative, the patient will need to be started on P

## (undated) NOTE — LETTER
[address], [place]  Authorization for Invasive Procedures  1.  I hereby authorize Dr. Zoltan Tijerina , my physician and whomever may be designated as the doctor's assistant, to perform the following operation and/or procedure:  [place]ophag[place] 4. Should the need arise during my operation or immediate post-operative period; I also consent to the administration of blood and/or blood products.  Further, I understand that despite careful testing and screening of blood and blood products, I may still 9. Patients having a sterilization procedure: I understand that if the procedure is successful the results will be permanent and it will therefore be impossible for me to inseminate, conceive or bear children.  I also understand that the procedure is intend

## (undated) NOTE — LETTER
Hospital Discharge Documentation  Please phone to schedule a hospital follow up appointment.     From: [address] Hospitalist's Office  Phone: [phone]    Patient discharged time/date: 10/29/2020  5:17 PM  Patient discharge disposition:  Home or [facility]   BP: 129/63 152/63 157/70 156/65   BP Location: Left arm Left arm Left arm Left arm   Pulse: 80 78 75 75   Resp: 17 15 19 18   Temp:    98.3 °F (36.8 °C)   TempSrc:    Oral   SpO2: 97% 96% 97% 96%   Weight:       Height:         Patient Weight for th CONCLUSION:  1. No acute cardiopulmonary finding. 2. Large retrocardiac hiatal hernia unchanged.     Dictated by (CST): Jaja Perez MD on 10/22/2020 at 8:31 AM     Finalized by (CST): Jaja Perez MD on 10/22/2020 at 8:33 AM            LABS :     Lab R Disposition: Discharge to home    Condition at Discharge: Stable     Discharge Medications:      Discharge Medications      START taking these medications      Instructions Prescription details   Calcium Carbonate Antacid 500 MG Chew  Commonly known as: ROB Stop taking on: October 30, 2020  Quantity: 15 tablet  Refills: 0        STOP taking these medications    cephALEXin 500 MG Caps  Commonly known as: Keflex        Sulfamethoxazole-TMP -160 MG Tabs per tablet  Commonly known as: BACTRIM DS

## (undated) NOTE — LETTER
[address]  Authorization for Surgical Operation and Procedure                                                                                           I hereby authorize Issa Ryder MD, my physician and his/her assistants (if applicable), which may include medical students, residents, and/or fellows, to perform the following surgical operation/ procedure and administer such anesthesia as may be determined necessary by my physician: Operation/Procedure name (s) ENDOSCOPIC ULTRASOUND with fine needle aspiration on Nawaf Wallis   2. I recognize that during the surgical operation/procedure, unforeseen conditions may necessitate additional or different procedures than those listed above. I, therefore, further authorize and request that the above-named surgeon, assistants, or designees perform such procedures as are, in their judgment, necessary and desirable. 3.   My surgeon/physician has discussed prior to my surgery the potential benefits, risks and side effects of this procedure; the likelihood of achieving goals; and potential problems that might occur during recuperation. They also discussed reasonable alternatives to the procedure, including risks, benefits, and side effects related to the alternatives and risks related to not receiving this procedure. I have had all my questions answered and I acknowledge that no guarantee has been made as to the result that may be obtained. 4.   Should the need arise during my operation/procedure, which includes change of level of care prior to discharge, I also consent to the administration of blood and/or blood products. Further, I understand that despite careful testing and screening of blood or blood products by collecting agencies, I may still be subject to ill effects as a result of receiving a blood transfusion and/or blood products.   The following are some, but not all, of the potential risks that can occur: fever and allergic reactions, hemolytic reactions, transmission of diseases such as Hepatitis, AIDS and Cytomegalovirus (CMV) and fluid overload. In the event that I wish to have an autologous transfusion of my own blood, or a directed donor transfusion, I will discuss this with my physician. Check only if Refusing Blood or Blood Products  I understand refusal of blood or blood products as deemed necessary by my physician may have serious consequences to my condition to include possible death. I hereby assume responsibility for my refusal and release the hospital, its personnel, and my physicians from any responsibility for the consequences of my refusal.    o  Refuse   5. I authorize the use of any specimen, organs, tissues, body parts or foreign objects that may be removed from my body during the operation/procedure for diagnosis, research or teaching purposes and their subsequent disposal by hospital authorities. I also authorize the release of specimen test results and/or written reports to my treating physician on the hospital medical staff or other referring or consulting physicians involved in my care, at the discretion of the Pathologist or my treating physician. 6.   I consent to the photographing or videotaping of the operations or procedures to be performed, including appropriate portions of my body for medical, scientific, or educational purposes, provided my identity is not revealed by the pictures or by descriptive texts accompanying them. If the procedure has been photographed/videotaped, the surgeon will obtain the original picture, image, videotape or CD. The hospital will not be responsible for storage, release or maintenance of the picture, image, tape or CD.    7.   I consent to the presence of a  or observers in the operating room as deemed necessary by my physician or their designees.     8.   I recognize that in the event my procedure results in extended X-Ray/fluoroscopy time, I may develop a skin reaction. 9. If I have a Do Not Attempt Resuscitation (DNAR) order in place, that status will be suspended while in the operating room, procedural suite, and during the recovery period unless otherwise explicitly stated by me (or a person authorized to consent on my behalf). The surgeon or my attending physician will determine when the applicable recovery period ends for purposes of reinstating the DNAR order. 10. Patients having a sterilization procedure: I understand that if the procedure is successful the results will be permanent and it will therefore be impossible for me to inseminate, conceive, or bear children. I also understand that the procedure is intended to result in sterility, although the result has not been guaranteed. 11. I acknowledge that my physician has explained sedation/analgesia administration to me including the risk and benefits I consent to the administration of sedation/analgesia as may be necessary or desirable in the judgment of my physician. I CERTIFY THAT I HAVE READ AND FULLY UNDERSTAND THE ABOVE CONSENT TO OPERATION and/or OTHER PROCEDURE.     _________________________________________ _________________________________     ___________________________________  Signature of Patient     Signature of Responsible Person                   Printed Name of Responsible Person                              _________________________________________ ______________________________        ___________________________________  Signature of Witness         Date  Time         Relationship to Patient    STATEMENT OF PHYSICIAN My signature below affirms that prior to the time of the procedure; I have explained to the patient and/or his/her legal representative, the risks and benefits involved in the proposed treatment and any reasonable alternative to the proposed treatment.  I have also explained the risks and benefits involved in refusal of the proposed treatment and alternatives to the proposed treatment and have answered the patient's questions.  If I have a significant financial interest in a co-management agreement or a significant financial interest in any product or implant, or other significant relationship used in this procedure/surgery, I have disclosed this and had a discussion with my patient.     _______________________________________________________________ _____________________________  Jaleesa Brown of Physician)                                                                                         (Date)                                   (Time)  Patient Name: Rosemarie Qiu    : 1940   Printed: 2023      Medical Record #: [id number]                                              Page 1 of 1

## (undated) NOTE — Clinical Note
Dear Win Rhodes,  I had the opportunity to see your patient Jorge Lundberg for an EMG recently. I appreciate your confidence in me to care for your patients. Please feel free call me with any questions at [phone] or contact me through [facility].   Sincerely, Gracie Harris MD Board Certified, Physical Medicine and Rehabilitation Specializing in [facility], Spine Medicine and [facility]

## (undated) NOTE — LETTER
[address], [place]  Authorization for Invasive Procedures  1.  I hereby authorize Dr. Bishop Munoz , my physician and whomever may be designated as the doctor's assistant, to perform the following operation and/or procedure: Cardiac ca 4. Should the need arise during my operation or immediate post-operative period; I also consent to the administration of blood and/or blood products.  Further, I understand that despite careful testing and screening of blood and blood products, I may still 9. Patients having a sterilization procedure: I understand that if the procedure is successful the results will be permanent and it will therefore be impossible for me to inseminate, conceive or bear children.  I also understand that the procedure is intend

## (undated) NOTE — IP AVS SNAPSHOT
[address]  [address] [facility] [phone]                Discharge Summary   1/16/2017    Maryan Connor           Admission Information        Provider Department    1/16/2017 Alcario Oppenheim MD, Karen Brooks MD Em Last time this was given:  100 mg on 1/18/2017  9:21 AM   Commonly known as: Toprol XL   Next dose due: Tomorrow 01/19        Take 100 mg by mouth daily. nitroglycerin 0.6 MG Subl   Commonly known as:  NITROSTAT        Place 0. 9.8 (01/16/17)  5.05 (01/16/17)  13.2 (01/16/17)  40.7 (01/16/17)  80.6 (01/16/17)  26.1 (L) (01/16/17)  32.4  (01/16/17)  451 (H) (01/16/17)  7.1 (L)      Recent Hematology Lab Results (cont.)  (Last 3 results in the past 90 days)    Neutrophil % Lymphocy and ask to get set up for an insurance coverage that is in-network with [employer] [facility]. [employer]     Sign up for [employer], your secure online medical record.   [employer] will allow you to access patient instructions from your recent visit,  view What to report to your healthcare team:  Dizziness, nausea, chest pain, weakness, numbness           Cholesterol Lowering Medications     Atorvastatin Calcium 40 MG Oral Tab       Use: Lower cholesterol, protect your heart   Most common side effects: Dizzi

## (undated) NOTE — LETTER
[facility]  Administration of Anesthesia  1. I, Juanita Busch, or _________________________________ acting on her behalf, (Patient) (Dependent/Representative) request to receive anesthesia for my pending procedure/operation/treatmen 6. OBSTETRIC PATIENTS: Specific risks/consequences of spinal/epidural anesthesia may include itching, low blood pressure, difficulty urinating, slowing of the baby's heart rate and headache.  Rare risks include infections, high spinal block, spinal bleeding ___________________________________________________           _____________________________________________________  Date/Time                                                                                               Responsible person in case of minor